# Patient Record
Sex: FEMALE | Race: WHITE | Employment: FULL TIME | URBAN - METROPOLITAN AREA
[De-identification: names, ages, dates, MRNs, and addresses within clinical notes are randomized per-mention and may not be internally consistent; named-entity substitution may affect disease eponyms.]

---

## 2017-04-26 ENCOUNTER — HOSPITAL ENCOUNTER (INPATIENT)
Age: 62
LOS: 1 days | Discharge: HOME | DRG: 247 | End: 2017-04-27
Attending: EMERGENCY MEDICINE | Admitting: INTERNAL MEDICINE
Payer: COMMERCIAL

## 2017-04-26 ENCOUNTER — APPOINTMENT (OUTPATIENT)
Dept: GENERAL RADIOLOGY | Age: 62
DRG: 247 | End: 2017-04-26
Attending: EMERGENCY MEDICINE
Payer: COMMERCIAL

## 2017-04-26 DIAGNOSIS — I21.4 NSTEMI (NON-ST ELEVATED MYOCARDIAL INFARCTION) (HCC): Primary | ICD-10-CM

## 2017-04-26 DIAGNOSIS — R07.9 ACUTE CHEST PAIN: ICD-10-CM

## 2017-04-26 PROBLEM — I20.0 UNSTABLE ANGINA (HCC): Status: ACTIVE | Noted: 2017-04-26

## 2017-04-26 LAB
ALBUMIN SERPL BCP-MCNC: 4 G/DL (ref 3.5–5)
ALBUMIN/GLOB SERPL: 1.2 {RATIO} (ref 1.1–2.2)
ALP SERPL-CCNC: 95 U/L (ref 45–117)
ALT SERPL-CCNC: 28 U/L (ref 12–78)
ANION GAP BLD CALC-SCNC: 12 MMOL/L (ref 5–15)
APPEARANCE UR: CLEAR
AST SERPL W P-5'-P-CCNC: 14 U/L (ref 15–37)
ATRIAL RATE: 70 BPM
ATRIAL RATE: 83 BPM
ATRIAL RATE: 85 BPM
BACTERIA URNS QL MICRO: NEGATIVE /HPF
BASOPHILS # BLD AUTO: 0 K/UL (ref 0–0.1)
BASOPHILS # BLD: 0 % (ref 0–1)
BILIRUB SERPL-MCNC: 0.3 MG/DL (ref 0.2–1)
BILIRUB UR QL: NEGATIVE
BUN SERPL-MCNC: 15 MG/DL (ref 6–20)
BUN/CREAT SERPL: 25 (ref 12–20)
CALCIUM SERPL-MCNC: 8.6 MG/DL (ref 8.5–10.1)
CALCULATED P AXIS, ECG09: 20 DEGREES
CALCULATED P AXIS, ECG09: 22 DEGREES
CALCULATED P AXIS, ECG09: 28 DEGREES
CALCULATED R AXIS, ECG10: 10 DEGREES
CALCULATED R AXIS, ECG10: 12 DEGREES
CALCULATED R AXIS, ECG10: 15 DEGREES
CALCULATED T AXIS, ECG11: 25 DEGREES
CALCULATED T AXIS, ECG11: 31 DEGREES
CALCULATED T AXIS, ECG11: 34 DEGREES
CHLORIDE SERPL-SCNC: 103 MMOL/L (ref 97–108)
CK MB CFR SERPL CALC: 2.3 % (ref 0–2.5)
CK MB SERPL-MCNC: 2 NG/ML (ref 5–25)
CK SERPL-CCNC: 79 U/L (ref 26–192)
CK SERPL-CCNC: 86 U/L (ref 26–192)
CO2 SERPL-SCNC: 23 MMOL/L (ref 21–32)
COLOR UR: ABNORMAL
CREAT SERPL-MCNC: 0.59 MG/DL (ref 0.55–1.02)
D DIMER PPP FEU-MCNC: 0.29 MG/L FEU (ref 0–0.65)
DIAGNOSIS, 93000: NORMAL
EOSINOPHIL # BLD: 0.4 K/UL (ref 0–0.4)
EOSINOPHIL NFR BLD: 6 % (ref 0–7)
EPITH CASTS URNS QL MICRO: ABNORMAL /LPF
ERYTHROCYTE [DISTWIDTH] IN BLOOD BY AUTOMATED COUNT: 13 % (ref 11.5–14.5)
GLOBULIN SER CALC-MCNC: 3.4 G/DL (ref 2–4)
GLUCOSE SERPL-MCNC: 115 MG/DL (ref 65–100)
GLUCOSE UR STRIP.AUTO-MCNC: NEGATIVE MG/DL
HCT VFR BLD AUTO: 37 % (ref 35–47)
HGB BLD-MCNC: 12.7 G/DL (ref 11.5–16)
HGB UR QL STRIP: NEGATIVE
KETONES UR QL STRIP.AUTO: NEGATIVE MG/DL
LEUKOCYTE ESTERASE UR QL STRIP.AUTO: ABNORMAL
LYMPHOCYTES # BLD AUTO: 24 % (ref 12–49)
LYMPHOCYTES # BLD: 1.8 K/UL (ref 0.8–3.5)
MAGNESIUM SERPL-MCNC: 2 MG/DL (ref 1.6–2.4)
MCH RBC QN AUTO: 30 PG (ref 26–34)
MCHC RBC AUTO-ENTMCNC: 34.3 G/DL (ref 30–36.5)
MCV RBC AUTO: 87.5 FL (ref 80–99)
MONOCYTES # BLD: 0.5 K/UL (ref 0–1)
MONOCYTES NFR BLD AUTO: 7 % (ref 5–13)
NEUTS SEG # BLD: 4.6 K/UL (ref 1.8–8)
NEUTS SEG NFR BLD AUTO: 63 % (ref 32–75)
NITRITE UR QL STRIP.AUTO: NEGATIVE
P-R INTERVAL, ECG05: 138 MS
P-R INTERVAL, ECG05: 152 MS
P-R INTERVAL, ECG05: 156 MS
PH UR STRIP: 6.5 [PH] (ref 5–8)
PLATELET # BLD AUTO: 254 K/UL (ref 150–400)
POTASSIUM SERPL-SCNC: 3.9 MMOL/L (ref 3.5–5.1)
PROT SERPL-MCNC: 7.4 G/DL (ref 6.4–8.2)
PROT UR STRIP-MCNC: NEGATIVE MG/DL
Q-T INTERVAL, ECG07: 378 MS
Q-T INTERVAL, ECG07: 394 MS
Q-T INTERVAL, ECG07: 412 MS
QRS DURATION, ECG06: 86 MS
QRS DURATION, ECG06: 92 MS
QRS DURATION, ECG06: 92 MS
QTC CALCULATION (BEZET), ECG08: 444 MS
QTC CALCULATION (BEZET), ECG08: 449 MS
QTC CALCULATION (BEZET), ECG08: 462 MS
RBC # BLD AUTO: 4.23 M/UL (ref 3.8–5.2)
RBC #/AREA URNS HPF: ABNORMAL /HPF (ref 0–5)
SODIUM SERPL-SCNC: 138 MMOL/L (ref 136–145)
SP GR UR REFRACTOMETRY: 1.01 (ref 1–1.03)
TROPONIN I BLD-MCNC: <0.04 NG/ML (ref 0–0.08)
TROPONIN I SERPL-MCNC: 0.06 NG/ML
TROPONIN I SERPL-MCNC: 0.09 NG/ML
UA: UC IF INDICATED,UAUC: ABNORMAL
UROBILINOGEN UR QL STRIP.AUTO: 0.2 EU/DL (ref 0.2–1)
VENTRICULAR RATE, ECG03: 70 BPM
VENTRICULAR RATE, ECG03: 83 BPM
VENTRICULAR RATE, ECG03: 85 BPM
WBC # BLD AUTO: 7.3 K/UL (ref 3.6–11)
WBC URNS QL MICRO: ABNORMAL /HPF (ref 0–4)

## 2017-04-26 PROCEDURE — 36415 COLL VENOUS BLD VENIPUNCTURE: CPT | Performed by: EMERGENCY MEDICINE

## 2017-04-26 PROCEDURE — B2111ZZ FLUOROSCOPY OF MULTIPLE CORONARY ARTERIES USING LOW OSMOLAR CONTRAST: ICD-10-PCS | Performed by: INTERNAL MEDICINE

## 2017-04-26 PROCEDURE — 87086 URINE CULTURE/COLONY COUNT: CPT | Performed by: EMERGENCY MEDICINE

## 2017-04-26 PROCEDURE — C1769 GUIDE WIRE: HCPCS

## 2017-04-26 PROCEDURE — 81001 URINALYSIS AUTO W/SCOPE: CPT | Performed by: EMERGENCY MEDICINE

## 2017-04-26 PROCEDURE — 74011000250 HC RX REV CODE- 250: Performed by: EMERGENCY MEDICINE

## 2017-04-26 PROCEDURE — C1725 CATH, TRANSLUMIN NON-LASER: HCPCS

## 2017-04-26 PROCEDURE — 85025 COMPLETE CBC W/AUTO DIFF WBC: CPT | Performed by: EMERGENCY MEDICINE

## 2017-04-26 PROCEDURE — C1887 CATHETER, GUIDING: HCPCS

## 2017-04-26 PROCEDURE — 84484 ASSAY OF TROPONIN QUANT: CPT | Performed by: EMERGENCY MEDICINE

## 2017-04-26 PROCEDURE — 74011250637 HC RX REV CODE- 250/637

## 2017-04-26 PROCEDURE — 80053 COMPREHEN METABOLIC PANEL: CPT | Performed by: EMERGENCY MEDICINE

## 2017-04-26 PROCEDURE — 87186 SC STD MICRODIL/AGAR DIL: CPT | Performed by: EMERGENCY MEDICINE

## 2017-04-26 PROCEDURE — 74011250636 HC RX REV CODE- 250/636

## 2017-04-26 PROCEDURE — 77030019697 HC SYR ANGI INFL MRTM -B

## 2017-04-26 PROCEDURE — 87077 CULTURE AEROBIC IDENTIFY: CPT | Performed by: EMERGENCY MEDICINE

## 2017-04-26 PROCEDURE — 87147 CULTURE TYPE IMMUNOLOGIC: CPT | Performed by: EMERGENCY MEDICINE

## 2017-04-26 PROCEDURE — 82550 ASSAY OF CK (CPK): CPT | Performed by: EMERGENCY MEDICINE

## 2017-04-26 PROCEDURE — 82553 CREATINE MB FRACTION: CPT | Performed by: EMERGENCY MEDICINE

## 2017-04-26 PROCEDURE — 71010 XR CHEST PORT: CPT

## 2017-04-26 PROCEDURE — 74011250637 HC RX REV CODE- 250/637: Performed by: EMERGENCY MEDICINE

## 2017-04-26 PROCEDURE — 74011000250 HC RX REV CODE- 250

## 2017-04-26 PROCEDURE — 96375 TX/PRO/DX INJ NEW DRUG ADDON: CPT

## 2017-04-26 PROCEDURE — 99285 EMERGENCY DEPT VISIT HI MDM: CPT

## 2017-04-26 PROCEDURE — B2151ZZ FLUOROSCOPY OF LEFT HEART USING LOW OSMOLAR CONTRAST: ICD-10-PCS | Performed by: INTERNAL MEDICINE

## 2017-04-26 PROCEDURE — 74011250637 HC RX REV CODE- 250/637: Performed by: INTERNAL MEDICINE

## 2017-04-26 PROCEDURE — 74011250636 HC RX REV CODE- 250/636: Performed by: INTERNAL MEDICINE

## 2017-04-26 PROCEDURE — 74011250636 HC RX REV CODE- 250/636: Performed by: EMERGENCY MEDICINE

## 2017-04-26 PROCEDURE — C1874 STENT, COATED/COV W/DEL SYS: HCPCS

## 2017-04-26 PROCEDURE — 027034Z DILATION OF CORONARY ARTERY, ONE ARTERY WITH DRUG-ELUTING INTRALUMINAL DEVICE, PERCUTANEOUS APPROACH: ICD-10-PCS | Performed by: INTERNAL MEDICINE

## 2017-04-26 PROCEDURE — 74011636320 HC RX REV CODE- 636/320

## 2017-04-26 PROCEDURE — 93005 ELECTROCARDIOGRAM TRACING: CPT

## 2017-04-26 PROCEDURE — 65660000000 HC RM CCU STEPDOWN

## 2017-04-26 PROCEDURE — 85379 FIBRIN DEGRADATION QUANT: CPT | Performed by: EMERGENCY MEDICINE

## 2017-04-26 PROCEDURE — 4A023N7 MEASUREMENT OF CARDIAC SAMPLING AND PRESSURE, LEFT HEART, PERCUTANEOUS APPROACH: ICD-10-PCS | Performed by: INTERNAL MEDICINE

## 2017-04-26 PROCEDURE — C1894 INTRO/SHEATH, NON-LASER: HCPCS

## 2017-04-26 PROCEDURE — C1760 CLOSURE DEV, VASC: HCPCS

## 2017-04-26 PROCEDURE — 83735 ASSAY OF MAGNESIUM: CPT | Performed by: EMERGENCY MEDICINE

## 2017-04-26 PROCEDURE — 96374 THER/PROPH/DIAG INJ IV PUSH: CPT

## 2017-04-26 PROCEDURE — 99153 MOD SED SAME PHYS/QHP EA: CPT

## 2017-04-26 RX ORDER — GUAIFENESIN 100 MG/5ML
81 LIQUID (ML) ORAL DAILY
Status: DISCONTINUED | OUTPATIENT
Start: 2017-04-26 | End: 2017-04-27 | Stop reason: HOSPADM

## 2017-04-26 RX ORDER — ZOLPIDEM TARTRATE 5 MG/1
5 TABLET ORAL
Status: DISCONTINUED | OUTPATIENT
Start: 2017-04-26 | End: 2017-04-27 | Stop reason: HOSPADM

## 2017-04-26 RX ORDER — METOPROLOL TARTRATE 5 MG/5ML
5 INJECTION INTRAVENOUS ONCE
Status: COMPLETED | OUTPATIENT
Start: 2017-04-26 | End: 2017-04-26

## 2017-04-26 RX ORDER — GUAIFENESIN 100 MG/5ML
324 LIQUID (ML) ORAL
Status: COMPLETED | OUTPATIENT
Start: 2017-04-26 | End: 2017-04-26

## 2017-04-26 RX ORDER — METOPROLOL TARTRATE 5 MG/5ML
INJECTION INTRAVENOUS
Status: COMPLETED
Start: 2017-04-26 | End: 2017-04-26

## 2017-04-26 RX ORDER — ONDANSETRON 2 MG/ML
4 INJECTION INTRAMUSCULAR; INTRAVENOUS
Status: COMPLETED | OUTPATIENT
Start: 2017-04-26 | End: 2017-04-26

## 2017-04-26 RX ORDER — MIDAZOLAM HYDROCHLORIDE 1 MG/ML
INJECTION, SOLUTION INTRAMUSCULAR; INTRAVENOUS
Status: COMPLETED
Start: 2017-04-26 | End: 2017-04-26

## 2017-04-26 RX ORDER — METOPROLOL SUCCINATE 25 MG/1
25 TABLET, EXTENDED RELEASE ORAL DAILY
Status: DISCONTINUED | OUTPATIENT
Start: 2017-04-26 | End: 2017-04-27 | Stop reason: HOSPADM

## 2017-04-26 RX ORDER — LIDOCAINE HYDROCHLORIDE 10 MG/ML
1-20 INJECTION INFILTRATION; PERINEURAL ONCE
Status: COMPLETED | OUTPATIENT
Start: 2017-04-26 | End: 2017-04-26

## 2017-04-26 RX ORDER — HEPARIN SODIUM 200 [USP'U]/100ML
500 INJECTION, SOLUTION INTRAVENOUS ONCE
Status: COMPLETED | OUTPATIENT
Start: 2017-04-26 | End: 2017-04-26

## 2017-04-26 RX ORDER — FENTANYL CITRATE 50 UG/ML
INJECTION, SOLUTION INTRAMUSCULAR; INTRAVENOUS
Status: COMPLETED
Start: 2017-04-26 | End: 2017-04-26

## 2017-04-26 RX ORDER — NITROGLYCERIN 20 MG/100ML
INJECTION INTRAVENOUS
Status: DISCONTINUED
Start: 2017-04-26 | End: 2017-04-26

## 2017-04-26 RX ORDER — CLOPIDOGREL 300 MG/1
TABLET, FILM COATED ORAL
Status: DISCONTINUED
Start: 2017-04-26 | End: 2017-04-26

## 2017-04-26 RX ORDER — HEPARIN SODIUM 1000 [USP'U]/ML
INJECTION, SOLUTION INTRAVENOUS; SUBCUTANEOUS
Status: COMPLETED
Start: 2017-04-26 | End: 2017-04-26

## 2017-04-26 RX ORDER — ATORVASTATIN CALCIUM 40 MG/1
40 TABLET, FILM COATED ORAL
Status: DISCONTINUED | OUTPATIENT
Start: 2017-04-26 | End: 2017-04-27 | Stop reason: HOSPADM

## 2017-04-26 RX ORDER — FENTANYL CITRATE 50 UG/ML
25-50 INJECTION, SOLUTION INTRAMUSCULAR; INTRAVENOUS
Status: DISCONTINUED | OUTPATIENT
Start: 2017-04-26 | End: 2017-04-26

## 2017-04-26 RX ORDER — PANTOPRAZOLE SODIUM 40 MG/1
40 TABLET, DELAYED RELEASE ORAL DAILY
Status: DISCONTINUED | OUTPATIENT
Start: 2017-04-26 | End: 2017-04-27 | Stop reason: HOSPADM

## 2017-04-26 RX ORDER — HEPARIN SODIUM 1000 [USP'U]/ML
4000 INJECTION, SOLUTION INTRAVENOUS; SUBCUTANEOUS ONCE
Status: COMPLETED | OUTPATIENT
Start: 2017-04-26 | End: 2017-04-26

## 2017-04-26 RX ORDER — MIDAZOLAM HYDROCHLORIDE 1 MG/ML
.5-2 INJECTION, SOLUTION INTRAMUSCULAR; INTRAVENOUS
Status: DISCONTINUED | OUTPATIENT
Start: 2017-04-26 | End: 2017-04-26

## 2017-04-26 RX ORDER — HEPARIN SODIUM 200 [USP'U]/100ML
INJECTION, SOLUTION INTRAVENOUS
Status: COMPLETED
Start: 2017-04-26 | End: 2017-04-26

## 2017-04-26 RX ORDER — LIDOCAINE HYDROCHLORIDE 10 MG/ML
INJECTION INFILTRATION; PERINEURAL
Status: COMPLETED
Start: 2017-04-26 | End: 2017-04-26

## 2017-04-26 RX ORDER — HEPARIN SODIUM 1000 [USP'U]/ML
5000 INJECTION, SOLUTION INTRAVENOUS; SUBCUTANEOUS ONCE
Status: COMPLETED | OUTPATIENT
Start: 2017-04-26 | End: 2017-04-26

## 2017-04-26 RX ORDER — OXYCODONE AND ACETAMINOPHEN 5; 325 MG/1; MG/1
1 TABLET ORAL
Status: DISCONTINUED | OUTPATIENT
Start: 2017-04-26 | End: 2017-04-27 | Stop reason: HOSPADM

## 2017-04-26 RX ORDER — SODIUM CHLORIDE 9 MG/ML
100 INJECTION, SOLUTION INTRAVENOUS CONTINUOUS
Status: DISPENSED | OUTPATIENT
Start: 2017-04-26 | End: 2017-04-26

## 2017-04-26 RX ORDER — SODIUM CHLORIDE 0.9 % (FLUSH) 0.9 %
5-10 SYRINGE (ML) INJECTION AS NEEDED
Status: DISCONTINUED | OUTPATIENT
Start: 2017-04-26 | End: 2017-04-27 | Stop reason: HOSPADM

## 2017-04-26 RX ORDER — SODIUM CHLORIDE 0.9 % (FLUSH) 0.9 %
5-10 SYRINGE (ML) INJECTION EVERY 8 HOURS
Status: DISCONTINUED | OUTPATIENT
Start: 2017-04-26 | End: 2017-04-27 | Stop reason: HOSPADM

## 2017-04-26 RX ORDER — NITROGLYCERIN 0.4 MG/1
0.4 TABLET SUBLINGUAL
Status: COMPLETED | OUTPATIENT
Start: 2017-04-26 | End: 2017-04-26

## 2017-04-26 RX ORDER — AMLODIPINE BESYLATE 5 MG/1
7.5 TABLET ORAL DAILY
COMMUNITY
End: 2021-03-23

## 2017-04-26 RX ADMIN — ASPIRIN 81 MG 324 MG: 81 TABLET ORAL at 07:26

## 2017-04-26 RX ADMIN — IOPAMIDOL 120 ML: 755 INJECTION, SOLUTION INTRAVENOUS at 11:13

## 2017-04-26 RX ADMIN — ATORVASTATIN CALCIUM 40 MG: 40 TABLET, FILM COATED ORAL at 22:24

## 2017-04-26 RX ADMIN — METOPROLOL TARTRATE 5 MG: 5 INJECTION INTRAVENOUS at 10:46

## 2017-04-26 RX ADMIN — IOPAMIDOL 39 ML: 755 INJECTION, SOLUTION INTRAVENOUS at 10:34

## 2017-04-26 RX ADMIN — HEPARIN SODIUM 1000 UNITS: 200 INJECTION, SOLUTION INTRAVENOUS at 10:32

## 2017-04-26 RX ADMIN — TICAGRELOR 180 MG: 90 TABLET ORAL at 11:12

## 2017-04-26 RX ADMIN — ONDANSETRON HYDROCHLORIDE 4 MG: 2 INJECTION, SOLUTION INTRAMUSCULAR; INTRAVENOUS at 07:46

## 2017-04-26 RX ADMIN — FENTANYL CITRATE 50 MCG: 50 INJECTION, SOLUTION INTRAMUSCULAR; INTRAVENOUS at 10:30

## 2017-04-26 RX ADMIN — OXYCODONE HYDROCHLORIDE AND ACETAMINOPHEN 1 TABLET: 5; 325 TABLET ORAL at 14:06

## 2017-04-26 RX ADMIN — NITROGLYCERIN 1 INCH: 20 OINTMENT TOPICAL at 07:26

## 2017-04-26 RX ADMIN — LIDOCAINE HYDROCHLORIDE 8 ML: 10 INJECTION, SOLUTION INFILTRATION; PERINEURAL at 10:33

## 2017-04-26 RX ADMIN — METOPROLOL TARTRATE 5 MG: 5 INJECTION INTRAVENOUS at 10:52

## 2017-04-26 RX ADMIN — METOPROLOL SUCCINATE 25 MG: 25 TABLET, EXTENDED RELEASE ORAL at 14:06

## 2017-04-26 RX ADMIN — HEPARIN SODIUM 4000 UNITS: 1000 INJECTION, SOLUTION INTRAVENOUS; SUBCUTANEOUS at 10:57

## 2017-04-26 RX ADMIN — HEPARIN SODIUM 5000 UNITS: 1000 INJECTION, SOLUTION INTRAVENOUS; SUBCUTANEOUS at 10:41

## 2017-04-26 RX ADMIN — NITROGLYCERIN 300 MCG: 5 INJECTION, SOLUTION INTRAVENOUS at 10:59

## 2017-04-26 RX ADMIN — MIDAZOLAM HYDROCHLORIDE 1 MG: 1 INJECTION, SOLUTION INTRAMUSCULAR; INTRAVENOUS at 10:40

## 2017-04-26 RX ADMIN — FENTANYL CITRATE 25 MCG: 50 INJECTION, SOLUTION INTRAMUSCULAR; INTRAVENOUS at 10:40

## 2017-04-26 RX ADMIN — HEPARIN SODIUM 1000 UNITS: 200 INJECTION, SOLUTION INTRAVENOUS at 10:31

## 2017-04-26 RX ADMIN — MIDAZOLAM HYDROCHLORIDE 2 MG: 1 INJECTION, SOLUTION INTRAMUSCULAR; INTRAVENOUS at 10:24

## 2017-04-26 RX ADMIN — NITROGLYCERIN 0.4 MG: 0.4 TABLET SUBLINGUAL at 07:26

## 2017-04-26 RX ADMIN — PANTOPRAZOLE SODIUM 40 MG: 40 TABLET, DELAYED RELEASE ORAL at 14:06

## 2017-04-26 RX ADMIN — ASPIRIN 81 MG: 81 TABLET, CHEWABLE ORAL at 14:06

## 2017-04-26 RX ADMIN — ZOLPIDEM TARTRATE 5 MG: 5 TABLET ORAL at 22:24

## 2017-04-26 RX ADMIN — LIDOCAINE HYDROCHLORIDE 40 ML: 20 SOLUTION ORAL; TOPICAL at 06:12

## 2017-04-26 RX ADMIN — LIDOCAINE HYDROCHLORIDE 8 ML: 10 INJECTION INFILTRATION; PERINEURAL at 10:33

## 2017-04-26 RX ADMIN — MIDAZOLAM HYDROCHLORIDE 2 MG: 1 INJECTION INTRAMUSCULAR; INTRAVENOUS at 10:24

## 2017-04-26 RX ADMIN — OXYCODONE HYDROCHLORIDE AND ACETAMINOPHEN 1 TABLET: 5; 325 TABLET ORAL at 20:43

## 2017-04-26 NOTE — PROGRESS NOTES
Pt up and ambulated to BR and then to chair to sit up for dinner. Pt denies any CP, SOB or dizziness. No bleeding at site.

## 2017-04-26 NOTE — PROCEDURES
PCI of Prox LAD    Pre Op : acute Unstable Angina Class IV. POst OP: Same. Sedation: 10:42 - 11;15   Versed / Fentanyl with continuous supervision. Prox LAD with hazy 95% stenosis. PCI with 3.5  Alpine DARRELL to 16 paul. 3.75 mm.   0% residual.   DALIA 3 flow.      Angioseal.

## 2017-04-26 NOTE — ED NOTES
Pt anxious at this time. Symptomatic blood pressure from nitro. Removed nitro paste and wiped area with towel. This nurse at patients bedside. Will continue to monitor.

## 2017-04-26 NOTE — ED NOTES
Informed patient of NPO status at this time. Patient denies any wants/needs, questions or concerns at this time. Will continue to monitor. Call light in reach.

## 2017-04-26 NOTE — IP AVS SNAPSHOT
Current Discharge Medication List  
  
START taking these medications Dose & Instructions Dispensing Information Comments Morning Noon Evening Bedtime  
 aspirin 81 mg chewable tablet Your last dose was: Your next dose is:    
   
   
 Dose:  81 mg Take 1 Tab by mouth daily. Quantity:  30 Tab Refills:  6  
     
   
   
   
  
 atorvastatin 40 mg tablet Commonly known as:  LIPITOR Your last dose was: Your next dose is:    
   
   
 Dose:  40 mg Take 1 Tab by mouth nightly. Quantity:  30 Tab Refills:  6  
     
   
   
   
  
 metoprolol succinate 25 mg XL tablet Commonly known as:  TOPROL-XL Your last dose was: Your next dose is:    
   
   
 Dose:  25 mg Take 1 Tab by mouth daily. Quantity:  30 Tab Refills:  6  
     
   
   
   
  
 ticagrelor 90 mg tablet Commonly known as:  [employer] Your last dose was: Your next dose is:    
   
   
 Dose:  90 mg Take 1 Tab by mouth every twelve (12) hours every twelve (12) hours. Quantity:  60 Tab Refills:  6 CONTINUE these medications which have CHANGED Dose & Instructions Dispensing Information Comments Morning Noon Evening Bedtime  
 amLODIPine 5 mg tablet Commonly known as:  Shon Michaels What changed:  Another medication with the same name was removed. Continue taking this medication, and follow the directions you see here. Your last dose was: Your next dose is:    
   
   
 Dose:  5 mg Take 5 mg by mouth daily. Refills:  0 Where to Get Your Medications Information on where to get these meds will be given to you by the nurse or doctor. ! Ask your nurse or doctor about these medications  
  aspirin 81 mg chewable tablet  
 atorvastatin 40 mg tablet  
 metoprolol succinate 25 mg XL tablet  
 ticagrelor 90 mg tablet

## 2017-04-26 NOTE — ED NOTES
Patient states she is feeling much better at this time. .  at bedside. Assisted pt to bathroom and back. Patient tolerated well. Denies any light headedness or dizziness. Will continue to monitor. Call light in reach. Bed locked and in lowest position.

## 2017-04-26 NOTE — ED NOTES
Cath lab team here to take patient. PT. Cell phone, purse, glasses, clothing, and slippers sent with patient to cath lab.

## 2017-04-26 NOTE — IP AVS SNAPSHOT
[address] [facility] 
[phone] Patient: Cruzito Alvarado MRN: [id number] AUQ:[id number] You are allergic to the following Allergen Reactions Codeine Nausea and Vomiting Recent Documentation Height Weight BMI OB Status Smoking Status 1.6 m 63.5 kg 24.8 kg/m2 Postmenopausal Never Smoker Unresulted Labs Order Current Status CULTURE, URINE In process Emergency Contacts Name Discharge Info Relation Home Work Mobile Roger Ayon DISCHARGE CAREGIVER [3] Spouse [3]   [phone] About your hospitalization You were admitted on:  April 26, 2017 You last received care in the:  [facility] 2 [facility] You were discharged on:  April 27, 2017 Unit phone number:  [phone] Why you were hospitalized Your primary diagnosis was:  Not on File Your diagnoses also included:  Unstable Angina (Hcc) Providers Seen During Your Hospitalizations Provider Role Specialty Primary office phone Stepan Arias MD Attending Provider Emergency Medicine [phone] Kaleb Bonner MD Attending Provider Cardiology [phone] Your Primary Care Physician (PCP) Primary Care Physician Office Phone Office Fax Sergio Glass [phone] Follow-up Information Follow up With Details Comments Contact Info Rhiannon Reyes MD   [address] [facility] [address] [facility] [address] [id number] 
[phone] Current Discharge Medication List  
  
START taking these medications Dose & Instructions Dispensing Information Comments Morning Noon Evening Bedtime  
 aspirin 81 mg chewable tablet Your last dose was: Your next dose is:    
   
   
 Dose:  81 mg Take 1 Tab by mouth daily. Quantity:  30 Tab Refills:  6  
     
   
   
   
  
 atorvastatin 40 mg tablet Commonly known as:  LIPITOR Your last dose was: Your next dose is:    
   
   
 Dose:  40 mg Take 1 Tab by mouth nightly. Quantity:  30 Tab Refills:  6  
     
   
   
   
  
 metoprolol succinate 25 mg XL tablet Commonly known as:  TOPROL-XL Your last dose was: Your next dose is:    
   
   
 Dose:  25 mg Take 1 Tab by mouth daily. Quantity:  30 Tab Refills:  6  
     
   
   
   
  
 ticagrelor 90 mg tablet Commonly known as:  [employer] Your last dose was: Your next dose is:    
   
   
 Dose:  90 mg Take 1 Tab by mouth every twelve (12) hours every twelve (12) hours. Quantity:  60 Tab Refills:  6 CONTINUE these medications which have CHANGED Dose & Instructions Dispensing Information Comments Morning Noon Evening Bedtime  
 amLODIPine 5 mg tablet Commonly known as:  [employer]ett What changed:  Another medication with the same name was removed. Continue taking this medication, and follow the directions you see here. Your last dose was: Your next dose is:    
   
   
 Dose:  5 mg Take 5 mg by mouth daily. Refills:  0 Where to Get Your Medications Information on where to get these meds will be given to you by the nurse or doctor. ! Ask your nurse or doctor about these medications  
  aspirin 81 mg chewable tablet  
 atorvastatin 40 mg tablet  
 metoprolol succinate 25 mg XL tablet  
 ticagrelor 90 mg tablet Discharge Instructions None Discharge Orders None Introducing [facility] SERVICES! Ammon Bolton introduces [email or web address] patient portal. Now you can access parts of your medical record, email your doctor's office, and request medication refills online. 1. In your internet browser, go to [email or web address] 2. Click on the First Time User? Click Here link in the Sign In box. You will see the New Member Sign Up page. 3. Enter your [employer] Access Code exactly as it appears below. You will not need to use this code after youve completed the sign-up process. If you do not sign up before the expiration date, you must request a new code. · [employer] Access Code: [email or web address] Expires: 7/25/2017  4:34 AM 
 
4. Enter the last four digits of your Social Security Number (xxxx) and Date of Birth (mm/dd/yyyy) as indicated and click Submit. You will be taken to the next sign-up page. 5. Create a [employer] ID. This will be your [employer] login ID and cannot be changed, so think of one that is secure and easy to remember. 6. Create a [employer] password. You can change your password at any time. 7. Enter your Password Reset Question and Answer. This can be used at a later time if you forget your password. 8. Enter your e-mail address. You will receive e-mail notification when new information is available in [address] 9. Click Sign Up. You can now view and download portions of your medical record. 10. Click the Download Summary menu link to download a portable copy of your medical information. If you have questions, please visit the Frequently Asked Questions section of the [employer] website. Remember, [employer] is NOT to be used for urgent needs. For medical emergencies, dial 911. Now available from your iPhone and Android! General Information Please provide this summary of care documentation to your next provider. Patient Signature:  ____________________________________________________________ Date:  ____________________________________________________________  
  
[facility] Provider Signature:  ____________________________________________________________ Date:  ____________________________________________________________

## 2017-04-26 NOTE — ED PROVIDER NOTES
HPI Comments: Nba Cruz, 63 y.o. female, presents via EMS to [facility] ED with cc of progressively worsening sharp and constant chest pain x [phone] last night. Patient states she felt \"lousy\" yesterday and then began to experience chest pain while lying in bed. She reports that her pain worsened at 0330 before calling for EMS. Her chest pain is associated with arm pain, feeling dizzy upon standing and leg swelling over the last few weeks. The patient reports that she was given ntg x 2 by EMS. She states she has had episodes of GERD-like pain in the past, which typically resolves within 15-20 minutes. The patient denies specific complaints of shortness of breath, diaphoresis, nausea, vomiting, palpitations, changes in stool, dysuria, cough, fever, or abd pain. She works as an  at [employer]. The patient denies performing any heavy lifting duties and states her job is not stressful at this time. She also denies any recent travel or standing for prolonged periods of time. Her fhx is significant for CAD with her father receiving a triple bypass around age 53 y/o. She also denies a pmhx of DM or HLD. The patient does not have a regular cardiologist and has never had a stress test.       PCP: Heidi Garay MD    PMHx significant for: HTN  PSHx significant for: none  Social history significant for: - Tobacco, + EtOH, - Illicit Drug Use    There are no other complaints, changes, or physical findings at this time. Written by COSTA Mark, as dictated by Pepe Reyna MD.     The history is provided by the patient. No  was used. Past Medical History:   Diagnosis Date    Hypertension        Past Surgical History:   Procedure Laterality Date    HX OTHER SURGICAL      surgery for fissure         History reviewed. No pertinent family history.     Social History     Social History    Marital status:      Spouse name: N/A    Number of children: N/A    Years of education: N/A     Occupational History    Not on file. Social History Main Topics    Smoking status: Never Smoker    Smokeless tobacco: Not on file    Alcohol use Yes      Comment: occassionally    Drug use: Not on file    Sexual activity: Not on file     Other Topics Concern    Not on file     Social History Narrative         ALLERGIES: Codeine    Review of Systems   Constitutional: Positive for fatigue. Negative for diaphoresis and fever. HENT: Negative. Eyes: Negative. Respiratory: Negative. Negative for cough. Cardiovascular: Positive for chest pain and leg swelling. Negative for palpitations. Gastrointestinal: Negative. Negative for abdominal pain, blood in stool, constipation, diarrhea, nausea and vomiting. Genitourinary: Negative. Negative for dysuria. Musculoskeletal: Positive for myalgias. Skin: Negative. Neurological: Negative. Psychiatric/Behavioral: Negative. All other systems reviewed and are negative. Patient Vitals for the past 12 hrs:   Temp Pulse Resp BP SpO2   04/26/17 0900 - 89 - 123/80 98 %   04/26/17 0830 - 80 17 124/72 97 %   04/26/17 0800 - 73 17 125/78 97 %   04/26/17 0748 - 75 11 128/69 98 %   04/26/17 0741 - 75 - (!) 78/65 98 %   04/26/17 0730 - 93 21 110/61 97 %   04/26/17 0726 - 77 - 131/75 -   04/26/17 0422 97.8 °F (36.6 °C) 87 15 - 98 %        Physical Exam   Nursing note and vitals reviewed.   General appearance - well nourished, well appearing, and in no distress  Eyes - pupils equal and reactive, extraocular eye movements intact  ENT - mucous membranes moist, pharynx normal without lesions  Neck - supple, no significant adenopathy; non-tender to palpation  Chest - clear to auscultation, no wheezes, rales or rhonchi; non-tender to palpation  Heart - normal rate and regular rhythm, S1 and S2 normal, no murmurs noted  Abdomen - soft, nontender, nondistended, no masses or organomegaly  Musculoskeletal - no joint tenderness, deformity or swelling; normal ROM  Extremities - peripheral pulses normal, no pedal edema  Skin - normal coloration and turgor, no rashes  Neurological - alert, oriented x3, normal speech, no focal findings or movement disorder noted      MDM  Number of Diagnoses or Management Options  Diagnosis management comments:   Ddx: chest wall pain, GERD, PNA, ACS        Amount and/or Complexity of Data Reviewed  Clinical lab tests: ordered and reviewed  Tests in the radiology section of CPT®: ordered and reviewed  Tests in the medicine section of CPT®: reviewed and ordered  Review and summarize past medical records: yes  Discuss the patient with other providers: yes (Cardiology )  Independent visualization of images, tracings, or specimens: yes    Patient Progress  Patient progress: stable    ED Course       Procedures    EKG interpretation: (Preliminary) 0425  Rhythm: sinus rhythm and PAC's. Rate (approx.): 85 bpm; Axis: normal; Normal MI, QRS, QTc intervals; Other findings: no ischemic changes. Written by Marcos Arcos ED scribe, as dictated by Jamie Fuller MD.     Progress Note:  6:46 AM  Patient resting in gurney. Updated and counseled on results. Patient now states she was given ntg x 2 by EMS and reports no relief with GI cocktail. Updated on plan to order ASA. Patient is agreeable. Written by Marcos Arcos ED Scribe, as dictated by Jamie Fuller MD.     SIGN OUT:  7:25 AM  Patient's presentation, labs/imaging and plan of care was reviewed with Lico Gross MD as part of sign out. They will discharge as part of the plan discussed with the patient. Lico Gross MD's assistance in completion of this plan is greatly appreciated but it should be noted that I will be the provider of record for this patient.     This note is prepared by Marcos Arcos, acting as a Scribe for Mariela Huang MD.    Jamie Fuller MD: The scribe's documentation has been prepared under my direction and personally reviewed by me in its entirety. I confirm that the notes above accurately reflects all work, treatment, procedures, and medical decision making performed by me. EKG interpretation: (Secondary) 0836  Rhythm: normal sinus rhythm; and regular . Rate (approx.): 83; Normal axis and intervals. No acute ischemic changes. Written by COSTA Disla, as dictated by Anya Gomez MD    CONSULT NOTE:  8:46 AM  Anya Gomez MD spoke with Dr. Viv Flores,  Specialty: Cardiology   Discussed pt's hx, disposition, and available diagnostic and imaging results. Reviewed care plans. Consultant agrees to evaluate the pt. Written by COSTA Disla, as dictated by Anya Gomez MD.    PROGRESS NOTE:  8:47 AM  Dr. Viv Flores is at beside at this time. Written by COSTA Disla, as dictated by Anya Gomez MD    9:19 AM  2nd troponin elevated, awaiting Dr. Viv Flores recommendation. Written by COSTA Disla, as dictated by Anya Gomez MD.    9:32 AM  Dr. Viv Flores agrees to admit to pt, and plans for cardiac cath. Written by COSTA Disla, as dictated by Anya Gomez MD    CRITICAL CARE NOTE :          IMPENDING DETERIORATION -Cardiovascular    ASSOCIATED RISK FACTORS - Vascular Compromise    MANAGEMENT- Bedside Assessment and Supervision of Care    INTERPRETATION -  Xrays, ECG and Blood Pressure    INTERVENTIONS - hemodynamic mngmt and Metobolic interventions    CASE REVIEW - Medical Sub-Specialist and Nursing    TREATMENT RESPONSE -Improved    PERFORMED BY - Self        NOTES   :      I have spent 30-74 minutes of critical care time involved in lab review, consultations with specialist, family decision- making, bedside attention and documentation. During this entire length of time I was immediately available to the patient .     Mariela Collado MD        LABORATORY TESTS:  Recent Results (from the past 12 hour(s))   EKG, 12 LEAD, INITIAL    Collection Time: 04/26/17  4:25 AM   Result Value Ref Range    Ventricular Rate 85 BPM    Atrial Rate 85 BPM    P-R Interval 138 ms    QRS Duration 86 ms    Q-T Interval 378 ms    QTC Calculation (Bezet) 449 ms    Calculated P Axis 22 degrees    Calculated R Axis 10 degrees    Calculated T Axis 34 degrees    Diagnosis       Sinus rhythm with premature supraventricular complexes  Otherwise normal ECG  No previous ECGs available     CBC WITH AUTOMATED DIFF    Collection Time: 04/26/17  5:21 AM   Result Value Ref Range    WBC 7.3 3.6 - 11.0 K/uL    RBC 4.23 3.80 - 5.20 M/uL    HGB 12.7 11.5 - 16.0 g/dL    HCT 37.0 35.0 - 47.0 %    MCV 87.5 80.0 - 99.0 FL    MCH 30.0 26.0 - 34.0 PG    MCHC 34.3 30.0 - 36.5 g/dL    RDW 13.0 11.5 - 14.5 %    PLATELET 673 733 - 676 K/uL    NEUTROPHILS 63 32 - 75 %    LYMPHOCYTES 24 12 - 49 %    MONOCYTES 7 5 - 13 %    EOSINOPHILS 6 0 - 7 %    BASOPHILS 0 0 - 1 %    ABS. NEUTROPHILS 4.6 1.8 - 8.0 K/UL    ABS. LYMPHOCYTES 1.8 0.8 - 3.5 K/UL    ABS. MONOCYTES 0.5 0.0 - 1.0 K/UL    ABS. EOSINOPHILS 0.4 0.0 - 0.4 K/UL    ABS. BASOPHILS 0.0 0.0 - 0.1 K/UL   METABOLIC PANEL, COMPREHENSIVE    Collection Time: 04/26/17  5:21 AM   Result Value Ref Range    Sodium 138 136 - 145 mmol/L    Potassium 3.9 3.5 - 5.1 mmol/L    Chloride 103 97 - 108 mmol/L    CO2 23 21 - 32 mmol/L    Anion gap 12 5 - 15 mmol/L    Glucose 115 (H) 65 - 100 mg/dL    BUN 15 6 - 20 MG/DL    Creatinine 0.59 0.55 - 1.02 MG/DL    BUN/Creatinine ratio 25 (H) 12 - 20      GFR est AA >60 >60 ml/min/1.73m2    GFR est non-AA >60 >60 ml/min/1.73m2    Calcium 8.6 8.5 - 10.1 MG/DL    Bilirubin, total 0.3 0.2 - 1.0 MG/DL    ALT (SGPT) 28 12 - 78 U/L    AST (SGOT) 14 (L) 15 - 37 U/L    Alk.  phosphatase 95 45 - 117 U/L    Protein, total 7.4 6.4 - 8.2 g/dL    Albumin 4.0 3.5 - 5.0 g/dL    Globulin 3.4 2.0 - 4.0 g/dL    A-G Ratio 1.2 1.1 - 2.2     CK W/ REFLX CKMB    Collection Time: 04/26/17  5:21 AM   Result Value Ref Range    CK 79 26 - 192 U/L   TROPONIN I    Collection Time: 04/26/17  5:21 AM   Result Value Ref Range    Troponin-I, Qt. 0.06 (H) <0.05 ng/mL   D DIMER    Collection Time: 04/26/17  5:21 AM   Result Value Ref Range    D-dimer 0.29 0.00 - 0.65 mg/L FEU   MAGNESIUM    Collection Time: 04/26/17  5:21 AM   Result Value Ref Range    Magnesium 2.0 1.6 - 2.4 mg/dL   POC TROPONIN-I    Collection Time: 04/26/17  5:27 AM   Result Value Ref Range    Troponin-I (POC) <0.04 0.00 - 0.08 ng/mL   URINALYSIS W/ REFLEX CULTURE    Collection Time: 04/26/17  5:35 AM   Result Value Ref Range    Color YELLOW/STRAW      Appearance CLEAR CLEAR      Specific gravity 1.011 1.003 - 1.030      pH (UA) 6.5 5.0 - 8.0      Protein NEGATIVE  NEG mg/dL    Glucose NEGATIVE  NEG mg/dL    Ketone NEGATIVE  NEG mg/dL    Bilirubin NEGATIVE  NEG      Blood NEGATIVE  NEG      Urobilinogen 0.2 0.2 - 1.0 EU/dL    Nitrites NEGATIVE  NEG      Leukocyte Esterase SMALL (A) NEG      WBC 5-10 0 - 4 /hpf    RBC 0-5 0 - 5 /hpf    Epithelial cells FEW FEW /lpf    Bacteria NEGATIVE  NEG /hpf    UA:UC IF INDICATED URINE CULTURE ORDERED (A) CNI     CK W/ CKMB & INDEX    Collection Time: 04/26/17  8:30 AM   Result Value Ref Range    CK 86 26 - 192 U/L    CK - MB 2.0 <3.6 NG/ML    CK-MB Index 2.3 0 - 2.5     TROPONIN I    Collection Time: 04/26/17  8:30 AM   Result Value Ref Range    Troponin-I, Qt. 0.09 (H) <0.05 ng/mL   EKG, 12 LEAD, SUBSEQUENT    Collection Time: 04/26/17  8:36 AM   Result Value Ref Range    Ventricular Rate 83 BPM    Atrial Rate 83 BPM    P-R Interval 156 ms    QRS Duration 92 ms    Q-T Interval 394 ms    QTC Calculation (Bezet) 462 ms    Calculated P Axis 20 degrees    Calculated R Axis 12 degrees    Calculated T Axis 25 degrees    Diagnosis       Normal sinus rhythm  Normal ECG  When compared with ECG of 26-APR-2017 04:25,  MANUAL COMPARISON REQUIRED, DATA IS UNCONFIRMED         IMAGING RESULTS:  Study Result      EXAM: XR CHEST PORT     INDICATION: Chest pain NOS, hypertension     COMPARISON: None     TECHNIQUE: Semiupright portable chest AP view     FINDINGS: Cardiac monitoring wires overlie the thorax. The cardiomediastinal and  hilar contours are within normal limits. The pulmonary vasculature is within  normal limits.      The lungs and pleural spaces are clear. The visualized bones and upper abdomen  are age-appropriate.     IMPRESSION  IMPRESSION:     No acute process on portable chest.          MEDICATIONS GIVEN:  Medications   mylanta/viscous lidocaine (MYRON)(GI COCKTAIL) (40 mL Oral Given 4/26/17 0612)   aspirin chewable tablet 324 mg (324 mg Oral Given 4/26/17 0726)   nitroglycerin (NITROSTAT) tablet 0.4 mg (0.4 mg SubLINGual Given 4/26/17 0726)   nitroglycerin (NITROBID) 2 % ointment 1 Inch (1 Inch Topical Given 4/26/17 0726)   ondansetron (ZOFRAN) injection 4 mg (4 mg IntraVENous Given 4/26/17 0746)       IMPRESSION:  1. NSTEMI (non-ST elevated myocardial infarction) ([facility] [address]    2. Acute chest pain        PLAN:  1. Admit to Hospital  Admit Note:  9:33 AM  Patient is being admitted to the hospital. The results of their tests and reasons for their admission have been discussed with the patient and/or available family. They convey their agreement and understanding for the need to be admitted and for their admission diagnosis. Written by Mukesh Matos, ED Scribe, as dictated by Hue Cardenas MD.          Attestation: This is note is prepared by Mukesh Matos, acting as Scribe for MD Hue Woo MD The scribe's documentation has been prepared under my direction and personally reviewed by me in its entirety. I confirm that the note above accurately reflects all work, treatment, procedures, and medical decision making performed by me. This note will not be viewable in [address]

## 2017-04-26 NOTE — PROGRESS NOTES
LHC, cor angio.  LV gram completed s complic   [place]    Findings    1- 90% proximal LAD, mild dz elsewhere  2- Nl overall LV systolic fxn with apical HK  3- nl LVEDP    Plan    PCI of LAD

## 2017-04-26 NOTE — CONSULTS
[address]   [address] [facility]       Name:  Shreya Tran   MR#:  [id number]   :  1955   Account #:  [de-identified]    Date of Consultation:  2017   Date of Adm:  2017       REQUESTING PHYSICIAN: Jack Culp. Dutch Escalera MD    REASON FOR CONSULTATION: Evaluate chest pain. CHIEF COMPLAINT: Chest pain. HISTORY OF PRESENT ILLNESS: The patient is a 65-year-old   female with a history of hypertension, but no prior cardiac history. She   presented after being brought in by rescue squad to the emergency   room earlier this morning. Last night after dinner, she developed   persistent left-sided chest pain with some radiation to her left arm. The   symptoms did not go away after a few minutes and the patient called   the rescue squad. She was given nitroglycerin sublingual en route. She   has also had some lower extremity swelling for the last several weeks. She is not particularly physically active. She does get short of breath   after walking up stairs. She has had prior chest discomfort in the past,   which she attributed to gastroesophageal reflux. She denies syncope   or dizziness. She denies palpitations or murmur. She has had no prior   stress testing. She does have a family history of heart disease with her   father having bypass surgery in his early 62s. In the ER, initial point of care troponin was negative, but a repeat was   0.06. A second troponin is pending. The patient is still experiencing   some left-sided chest discomfort, which is less intense than before. In terms of other risk factors, the patient denies diabetes. She does not   smoke. She says her lipids are okay. She has been on amlodipine for   the last several years. PAST MEDICAL HISTORY: Otherwise, fairly healthy. No other active   medical problems.     SURGERIES: Status post breast cyst surgery, which was benign,   status post vaginal surgery. CURRENT MEDICATIONS: Include only Norvasc. SOCIAL HISTORY: The patient does not smoke. She drinks alcohol   socially. She works as an  at [employer]. FAMILY HISTORY: As noted above. History of bypass in the patient's   father. REVIEW OF SYSTEMS: As noted above. No fever, no chills. Positive   for some diaphoresis. No thyroid problems. No diabetes. No melena,   no hematochezia. No nausea, vomiting, diarrhea. No liver or kidney   problems. No history of PE or DVT. No strokes or TIAs. No cancer. PHYSICAL EXAMINATION   GENERAL: Reveals a somewhat anxious appearing, middle-aged   white female. VITAL SIGNS: Blood pressure is 131/75, pulse 87, respirations 15,   temperature 97.8. HEENT: Pupils are equal and reactive to light. Oropharynx   revealed moist oral mucosa. NECK: Supple. No masses or thyromegaly. No cervical or   supraclavicular adenopathy. No carotid bruit. No JVD. CHEST: Clear. BACK: No scoliosis. CARDIAC: Regular rate and rhythm. Normal S1 and S2. No obvious   murmurs, rubs, gallops or clicks. ABDOMEN: Soft, nontender, no masses or organomegaly. Bowel   sounds positive. EXTREMITIES: No cyanosis, clubbing, or edema. Distal pulses 2+ in   the feet bilaterally. NEUROLOGIC: No obvious gross motor deficits. The patient is alert   and answers questions appropriately. LABORATORY DATA: D-dimer 0.29, troponin 0.06, hemoglobin 12.7. The remainder of the laboratories are unremarkable. Chest x-ray negative. EKG: Normal sinus rhythm, normal axis, no Q-waves, no obvious ST   or T changes. IMPRESSION   1. Chest pain of uncertain etiology, possible angina. 2. Hypertension. 3. Family history of coronary artery disease. RECOMMENDATIONS: Await the second troponin. If this is clearly   abnormal, the patient will be admitted for further workup. If the second   troponin is completely negative, we will probably discharge to home   with outpatient workup. MD Tam Holloway / Roz.Gess   D:  04/26/2017   09:11   T:  04/26/2017   09:48   Job #:  [id number]

## 2017-04-27 VITALS
SYSTOLIC BLOOD PRESSURE: 127 MMHG | OXYGEN SATURATION: 98 % | WEIGHT: 140 LBS | TEMPERATURE: 98.6 F | HEART RATE: 85 BPM | RESPIRATION RATE: 16 BRPM | HEIGHT: 63 IN | DIASTOLIC BLOOD PRESSURE: 60 MMHG | BODY MASS INDEX: 24.8 KG/M2

## 2017-04-27 LAB
ANION GAP BLD CALC-SCNC: 9 MMOL/L (ref 5–15)
BASOPHILS # BLD AUTO: 0 K/UL (ref 0–0.1)
BASOPHILS # BLD: 0 % (ref 0–1)
BUN SERPL-MCNC: 10 MG/DL (ref 6–20)
BUN/CREAT SERPL: 19 (ref 12–20)
CALCIUM SERPL-MCNC: 8.7 MG/DL (ref 8.5–10.1)
CHLORIDE SERPL-SCNC: 105 MMOL/L (ref 97–108)
CHOLEST SERPL-MCNC: 208 MG/DL
CO2 SERPL-SCNC: 25 MMOL/L (ref 21–32)
CREAT SERPL-MCNC: 0.54 MG/DL (ref 0.55–1.02)
EOSINOPHIL # BLD: 0.1 K/UL (ref 0–0.4)
EOSINOPHIL NFR BLD: 2 % (ref 0–7)
ERYTHROCYTE [DISTWIDTH] IN BLOOD BY AUTOMATED COUNT: 13.3 % (ref 11.5–14.5)
GLUCOSE SERPL-MCNC: 105 MG/DL (ref 65–100)
HCT VFR BLD AUTO: 33.6 % (ref 35–47)
HDLC SERPL-MCNC: 51 MG/DL
HDLC SERPL: 4.1 {RATIO} (ref 0–5)
HGB BLD-MCNC: 11.5 G/DL (ref 11.5–16)
LDLC SERPL CALC-MCNC: 125.6 MG/DL (ref 0–100)
LIPID PROFILE,FLP: ABNORMAL
LYMPHOCYTES # BLD AUTO: 32 % (ref 12–49)
LYMPHOCYTES # BLD: 2.2 K/UL (ref 0.8–3.5)
MCH RBC QN AUTO: 30 PG (ref 26–34)
MCHC RBC AUTO-ENTMCNC: 34.2 G/DL (ref 30–36.5)
MCV RBC AUTO: 87.7 FL (ref 80–99)
MONOCYTES # BLD: 0.5 K/UL (ref 0–1)
MONOCYTES NFR BLD AUTO: 8 % (ref 5–13)
NEUTS SEG # BLD: 3.9 K/UL (ref 1.8–8)
NEUTS SEG NFR BLD AUTO: 58 % (ref 32–75)
PLATELET # BLD AUTO: 237 K/UL (ref 150–400)
POTASSIUM SERPL-SCNC: 3.9 MMOL/L (ref 3.5–5.1)
RBC # BLD AUTO: 3.83 M/UL (ref 3.8–5.2)
SODIUM SERPL-SCNC: 139 MMOL/L (ref 136–145)
TRIGL SERPL-MCNC: 157 MG/DL (ref ?–150)
VLDLC SERPL CALC-MCNC: 31.4 MG/DL
WBC # BLD AUTO: 6.8 K/UL (ref 3.6–11)

## 2017-04-27 PROCEDURE — 85025 COMPLETE CBC W/AUTO DIFF WBC: CPT | Performed by: INTERNAL MEDICINE

## 2017-04-27 PROCEDURE — 74011250637 HC RX REV CODE- 250/637: Performed by: INTERNAL MEDICINE

## 2017-04-27 PROCEDURE — 80061 LIPID PANEL: CPT | Performed by: INTERNAL MEDICINE

## 2017-04-27 PROCEDURE — 36415 COLL VENOUS BLD VENIPUNCTURE: CPT | Performed by: INTERNAL MEDICINE

## 2017-04-27 PROCEDURE — 80048 BASIC METABOLIC PNL TOTAL CA: CPT | Performed by: INTERNAL MEDICINE

## 2017-04-27 RX ORDER — GUAIFENESIN 100 MG/5ML
81 LIQUID (ML) ORAL DAILY
Qty: 30 TAB | Refills: 6 | Status: SHIPPED | OUTPATIENT
Start: 2017-04-27

## 2017-04-27 RX ORDER — ATORVASTATIN CALCIUM 40 MG/1
40 TABLET, FILM COATED ORAL
Qty: 30 TAB | Refills: 6 | Status: SHIPPED | OUTPATIENT
Start: 2017-04-27 | End: 2021-03-11

## 2017-04-27 RX ORDER — METOPROLOL SUCCINATE 25 MG/1
25 TABLET, EXTENDED RELEASE ORAL DAILY
Qty: 30 TAB | Refills: 6 | Status: SHIPPED | OUTPATIENT
Start: 2017-04-27 | End: 2020-07-21 | Stop reason: DRUGHIGH

## 2017-04-27 RX ADMIN — TICAGRELOR 90 MG: 90 TABLET ORAL at 05:04

## 2017-04-27 RX ADMIN — PANTOPRAZOLE SODIUM 40 MG: 40 TABLET, DELAYED RELEASE ORAL at 08:16

## 2017-04-27 RX ADMIN — METOPROLOL SUCCINATE 25 MG: 25 TABLET, EXTENDED RELEASE ORAL at 08:16

## 2017-04-27 RX ADMIN — ASPIRIN 81 MG: 81 TABLET, CHEWABLE ORAL at 08:16

## 2017-04-27 RX ADMIN — Medication 10 ML: at 05:04

## 2017-04-27 NOTE — PROCEDURES
[facility], [address] [place] [address]   CORONARY ANGIOGRAPHY       Name:  Kristal Tinajero   MR#:  [id number]   :  1955   Account #:  [de-identified]        Date of Adm:  2017       CATHETERIZATION NUMBER:     TECHNIQUE: Right femoral artery via Charley. PROCEDURES   1. Left heart catheterization. 2. Coronary angiography. 3. Left ventriculography. ESTIMATED BLOOD LOSS: Less than 30 mL. SPECIMENS REMOVED: None. : Pippa Johnson MD.    INDICATION: Unstable angina. CATHETERS USED: 5-Slovak pigtail, 5-Slovak JL4, 5-Slovak JR4. MEDICATIONS USED: Please see the separate cath lab log sheet. SEDATION: Sedation was administered from 10:26 a.m. until 10:42   a.m. under the constant supervision of the attending physician. The   patient received IV Versed and IV fentanyl. COMPLICATIONS: None. FINDINGS   HEMODYNAMICS: Aortic pressure is 115/60 with a mean of 86. Left   ventricular pressure 119/0 with an LVEDP of 10. There is no significant   gradient on pullback across the aortic valve. LEFT VENTRICULOGRAPHY: Done from the COE view revealed a   normal-sized left ventricle with overall preserved systolic function. Estimated EF is 55-60%. There is mild apical hypokinesis. No other   wall motion abnormalities are seen. No significant mitral regurgitation   seen. CORONARY ANGIOGRAPHY: Revealed a right dominant system. The   left main was a moderate-sized vessel without significant disease. The   LAD had a very high-grade ostial and proximal stenosis, approximately   95% in severity with some associated haziness. The remainder of the   LAD was a moderate-sized vessel, which extended to the apex. There   were 2 moderate-sized diagonal branches off the mid LAD without   significant disease. The left circumflex was moderate in size. It gave off   a large first marginal branch without significant disease.  The OM2 was   a large vessel without significant disease. The remainder of the AV   groove circumflex distal to OM2 was a small vessel without significant   disease. The right coronary artery was a large, dominant vessel, which   bifurcated into a large PDA and large posterolateral branch with   multiple subbranches. This vessel had no apparent disease. CONCLUSION   1. Significant single-vessel coronary artery disease with a high-grade   proximal left anterior descending stenosis. 2. Preserved left ventricular systolic function with a global hypokinesis. 3. Normal resting left ventricular end-diastolic pressure. RECOMMENDATIONS: Percutaneous intervention on the LAD, which   will be reported separately.         MD Wil Grover / Cecilia.Bruno   D:  04/27/2017   14:23   T:  04/27/2017   15:06   Job #:  [id number]

## 2017-04-27 NOTE — DISCHARGE SUMMARY
[address] SUMMARY       Name:  Jana Vitale   MR#:  [id number]   :  1955   Account #:  [de-identified]        Date of Adm:  2017       DISCHARGE DIAGNOSES:   1. Unstable angina, resolved. 2. Status post percutaneous transluminal coronary angioplasty and   stenting of a high-grade proximal left anterior descending stenosis with   a drug-eluting stent on the day of admission. 3. Normal overall left ventricular systolic function with mild apical   hypokinesis. 4. Dyslipidemia. 5. Hypertension. 6. Family history of coronary artery disease. ADMISSION HISTORY AND PHYSICAL: Please see the separately   dictated note by Dr. Jaida Lynn on the day of admission. HOSPITAL COURSE: The patient was admitted. Her second set of   cardiac enzymes was borderline abnormal. She was having ongoing   symptoms of midsternal chest discomfort and was taken to the cardiac   catheterization lab. Initial coronary angiography revealed a high-grade   proximal LAD stenosis. There was mild disease in the remaining   vessels. Overall EF was normal with mild apical hypokinesis. The   patient subsequently underwent PTCA and stenting of the LAD by Dr. Genesis Altamirano with a good result. She did well post-procedure and was   started on aspirin and Brilinta. She was also started on metoprolol. A   fasting lipid profile revealed an LDL cholesterol of 126 with an HDL of   51, and triglycerides of 157. Remainder of labs were unremarkable. Chest x-ray was unremarkable. The patient was watched overnight. She had no problems with her catheterization site and no arrhythmias. The following day she was ambulatory and was felt to have reached   maximal hospital benefit. She was discharged to home with close   outpatient followup arranged. DISCHARGE MEDICATIONS:   1. Aspirin 81 mg daily. 2. Lipitor 40 mg daily. 3. Toprol-XL 25 mg daily. 4. Brilinta 90 mg twice daily. 5. Norvasc 5 mg daily. FOLLOWUP: The patient will follow up with Dr. Opal Seo   approximately 2 weeks post discharge and will also follow up with her   primary care physician.          MD Alo Merrill / Amna Ireland   D:  04/27/2017   09:05   T:  04/27/2017   14:05   Job #:  [id number]

## 2017-04-27 NOTE — PROGRESS NOTES
Pt received discharge instructions and prescriptions, Pt states understanding of follow-up care ans side effects of medications. Pt given opportunity for questions and clarifications. IV removed.  Rojean Leventhal RN

## 2017-04-27 NOTE — PROCEDURES
[facility], [address] [facility]   CORONARY ANGIOGRAPHY       Name:  Shreya Tran   MR#:  [id number]   :  1955   Account #:  [de-identified]        Date of Adm:  2017       ESTIMATED BLOOD LOSS: 10 mL. SPECIMENS REMOVED: None. PRIMARY CARDIOLOGIST: Mitzi Azevedo MD     PRIMARY CARE PHYSICIAN: Fazal Roberson MD    PROCEDURE: Percutaneous coronary intervention with stenting of the   proximal left anterior descending artery. PREOPERATIVE DIAGNOSES   1. Acute unstable angina pectoris. 2. Coronary atherosclerosis. POSTOPERATIVE DIAGNOSES   1. Acute unstable angina pectoris. 2. Coronary atherosclerosis. SEDATION: Versed and fentanyl administered with continuous   supervision, starting 10:42 a.m., and terminating at 11:15 a.m. Diagnostic catheterization performed by Dr. Rupert Leon and reported   separately. TECHNIQUE: A 6 Japanese left 4 guide was engaged in the left coronary   artery. A 0.014 Prowater wire was advanced across the obstruction in   the proximal LAD. Intravenous heparin was administered with ACT   monitored for anticoagulation. Balloon dilatation was performed followed by stenting using a 3.5 x 15   length Alpine drug eluting stent taken to 26 atmospheres for a final   diameter of 3.75 mm. Initial stenosis 95 to 99% stenosis, DALIA 3 flow. Final stenosis 0%, DALIA   3 flow. The sheath was removed and Angio-Seal closure device was placed. DESCRIPTION   LEFT ANTERIOR DESCENDING: The anterior descending artery has   severe stenosis right at the origin and proximal vessel with 95 to 99%   stenosis. The rest of the LAD was free of disease. Upon angioplasty there is  0% residual narrowing, DALIA 3.          MD Yg Peralta / Paris Muse   D:  2017   19:30   T:  2017   20:04   Job #:  [id number]

## 2017-04-27 NOTE — CARDIO/PULMONARY
Cardiopulmonary Rehab Nursing Entry:    Chart reviewed and pt visited. Pt s/p PCI/DARRELL secondary to cc of chest pain, admitted through the ED. Pt with PMHx HTN, non-smoker, has family history of CAD. Printed material given and discussed re: heart healthy habits, the cardiac diet, medication management, what to expect following coronary angioplasty, and post cardiac catheterization instructions. Discussed post catheterization restrictions including no lifting, no tub baths and no straining for 7 days. Also discussed what to do if bleeding or bruising at the cath insertion site is observed. Reviewed the cardiac diet (low NA/fat/CHOL), the importance of medication compliance, monitoring for any unusual signs & symptoms and when to call the doctor. Discussed the benefits of exercise and enrollment in Cardiac Rehab. Pt declined enrollment at this time due to full-time work status. Smoking history was assessed. Pt non-smoker. The pt verbalized understanding.

## 2017-04-28 LAB
BACTERIA SPEC CULT: ABNORMAL
BACTERIA SPEC CULT: ABNORMAL
CC UR VC: ABNORMAL
SERVICE CMNT-IMP: ABNORMAL

## 2017-05-07 ENCOUNTER — HOSPITAL ENCOUNTER (EMERGENCY)
Age: 62
Discharge: HOME | End: 2017-05-07
Attending: EMERGENCY MEDICINE
Payer: COMMERCIAL

## 2017-05-07 ENCOUNTER — APPOINTMENT (OUTPATIENT)
Dept: ULTRASOUND IMAGING | Age: 62
End: 2017-05-07
Attending: EMERGENCY MEDICINE
Payer: COMMERCIAL

## 2017-05-07 VITALS
HEART RATE: 80 BPM | DIASTOLIC BLOOD PRESSURE: 74 MMHG | TEMPERATURE: 97.8 F | HEIGHT: 63 IN | BODY MASS INDEX: 25.82 KG/M2 | RESPIRATION RATE: 16 BRPM | SYSTOLIC BLOOD PRESSURE: 155 MMHG | WEIGHT: 145.72 LBS | OXYGEN SATURATION: 100 %

## 2017-05-07 DIAGNOSIS — M79.661 RIGHT CALF PAIN: Primary | ICD-10-CM

## 2017-05-07 LAB
ALBUMIN SERPL BCP-MCNC: 4.1 G/DL (ref 3.5–5)
ALBUMIN/GLOB SERPL: 1.1 {RATIO} (ref 1.1–2.2)
ALP SERPL-CCNC: 108 U/L (ref 45–117)
ALT SERPL-CCNC: 29 U/L (ref 12–78)
ANION GAP BLD CALC-SCNC: 6 MMOL/L (ref 5–15)
APTT PPP: 24.7 SEC (ref 22.1–32.5)
AST SERPL W P-5'-P-CCNC: 14 U/L (ref 15–37)
BASOPHILS # BLD AUTO: 0 K/UL (ref 0–0.1)
BASOPHILS # BLD: 0 % (ref 0–1)
BILIRUB SERPL-MCNC: 0.3 MG/DL (ref 0.2–1)
BUN SERPL-MCNC: 14 MG/DL (ref 6–20)
BUN/CREAT SERPL: 19 (ref 12–20)
CALCIUM SERPL-MCNC: 9.2 MG/DL (ref 8.5–10.1)
CHLORIDE SERPL-SCNC: 107 MMOL/L (ref 97–108)
CO2 SERPL-SCNC: 28 MMOL/L (ref 21–32)
CREAT SERPL-MCNC: 0.73 MG/DL (ref 0.55–1.02)
EOSINOPHIL # BLD: 0.3 K/UL (ref 0–0.4)
EOSINOPHIL NFR BLD: 4 % (ref 0–7)
ERYTHROCYTE [DISTWIDTH] IN BLOOD BY AUTOMATED COUNT: 13 % (ref 11.5–14.5)
GLOBULIN SER CALC-MCNC: 3.6 G/DL (ref 2–4)
GLUCOSE SERPL-MCNC: 99 MG/DL (ref 65–100)
HCT VFR BLD AUTO: 36.5 % (ref 35–47)
HGB BLD-MCNC: 12.8 G/DL (ref 11.5–16)
INR PPP: 1 (ref 0.9–1.1)
LYMPHOCYTES # BLD AUTO: 35 % (ref 12–49)
LYMPHOCYTES # BLD: 2.6 K/UL (ref 0.8–3.5)
MCH RBC QN AUTO: 30.5 PG (ref 26–34)
MCHC RBC AUTO-ENTMCNC: 35.1 G/DL (ref 30–36.5)
MCV RBC AUTO: 87.1 FL (ref 80–99)
MONOCYTES # BLD: 0.6 K/UL (ref 0–1)
MONOCYTES NFR BLD AUTO: 8 % (ref 5–13)
NEUTS SEG # BLD: 3.9 K/UL (ref 1.8–8)
NEUTS SEG NFR BLD AUTO: 53 % (ref 32–75)
PLATELET # BLD AUTO: 271 K/UL (ref 150–400)
POTASSIUM SERPL-SCNC: 3.8 MMOL/L (ref 3.5–5.1)
PROT SERPL-MCNC: 7.7 G/DL (ref 6.4–8.2)
PROTHROMBIN TIME: 10 SEC (ref 9–11.1)
RBC # BLD AUTO: 4.19 M/UL (ref 3.8–5.2)
SODIUM SERPL-SCNC: 141 MMOL/L (ref 136–145)
THERAPEUTIC RANGE,PTTT: NORMAL SECS (ref 58–77)
WBC # BLD AUTO: 7.4 K/UL (ref 3.6–11)

## 2017-05-07 PROCEDURE — 36415 COLL VENOUS BLD VENIPUNCTURE: CPT | Performed by: EMERGENCY MEDICINE

## 2017-05-07 PROCEDURE — 85025 COMPLETE CBC W/AUTO DIFF WBC: CPT | Performed by: EMERGENCY MEDICINE

## 2017-05-07 PROCEDURE — 80053 COMPREHEN METABOLIC PANEL: CPT | Performed by: EMERGENCY MEDICINE

## 2017-05-07 PROCEDURE — 93971 EXTREMITY STUDY: CPT

## 2017-05-07 PROCEDURE — 85730 THROMBOPLASTIN TIME PARTIAL: CPT | Performed by: EMERGENCY MEDICINE

## 2017-05-07 PROCEDURE — 99282 EMERGENCY DEPT VISIT SF MDM: CPT

## 2017-05-07 PROCEDURE — 85610 PROTHROMBIN TIME: CPT | Performed by: EMERGENCY MEDICINE

## 2017-05-07 RX ORDER — HYDROCODONE BITARTRATE AND ACETAMINOPHEN 5; 325 MG/1; MG/1
1 TABLET ORAL
Qty: 12 TAB | Refills: 0 | Status: SHIPPED | OUTPATIENT
Start: 2017-05-07 | End: 2018-07-20

## 2017-05-07 NOTE — DISCHARGE INSTRUCTIONS
[employer] Activation    Thank you for requesting access to [employer]. Please follow the instructions below to securely access and download your online medical record. [employer] allows you to send messages to your doctor, view your test results, renew your prescriptions, schedule appointments, and more. How Do I Sign Up? 1. In your internet browser, go to [email or web address]  2. Click on the First Time User? Click Here link in the Sign In box. You will be redirect to the New Member Sign Up page. 3. Enter your [employer] Access Code exactly as it appears below. You will not need to use this code after youve completed the sign-up process. If you do not sign up before the expiration date, you must request a new code. [employer] Access Code: [email or web address]  Expires: 2017  4:34 AM (This is the date your [employer] access code will )    4. Enter the last four digits of your Social Security Number (xxxx) and Date of Birth (mm/dd/yyyy) as indicated and click Submit. You will be taken to the next sign-up page. 5. Create a [employer] ID. This will be your [employer] login ID and cannot be changed, so think of one that is secure and easy to remember. 6. Create a [employer] password. You can change your password at any time. 7. Enter your Password Reset Question and Answer. This can be used at a later time if you forget your password. 8. Enter your e-mail address. You will receive e-mail notification when new information is available in [address]. 9. Click Sign Up. You can now view and download portions of your medical record. 10. Click the Download Summary menu link to download a portable copy of your medical information. Additional Information    If you have questions, please visit the Frequently Asked Questions section of the [employer] website at [email or web address] "[employer] [email or web address] Remember, [employer] is NOT to be used for urgent needs. For medical emergencies, dial 911.

## 2017-05-07 NOTE — ED NOTES
Dr Vianca Woodward reviewed discharge instructions with the patient and family. The patient and family verbalized understanding. Pt AAOx4, respirations unlabored and regular, skin warm and dry. Pt denies need for wheelchair at time of discharge.

## 2017-05-07 NOTE — LETTER
[address] 
[facility] EMERGENCY DEPT 
[address] [place] [address] [id number] [phone] Work/School Note Date: 5/7/2017 To Whom It May concern: 
 
Nba Cruz was seen and treated today in the emergency room by the following provider(s): 
Attending Provider: Berny Ramey MD. Nba Cruz may return to work on 5/9/17.  
 
Sincerely,

## 2017-05-07 NOTE — ED PROVIDER NOTES
HPI Comments:   Tara Delvalle is a 63 y.o. female with a hx of HTN and CAD presenting to the ED with her daughter C/O right calf pain which started 2 days ago. Pt states pain developed after she had been on her feet all day and had not been that active since before 04/26/17 when she had a cardiac stent placed. She has rested and elevated the leg over the last 2 days with no improvement and daughter saw her limping around the house today so she brought her to the ED for evaluation. Pt states Dr. Alicja Mcdaniels performed the cardiac cath and told her she had a 95% blockage at that time. She is currently on ASA and Plavix. Patient denies CP, groin pain, or any other symptoms or complaints. PCP: Daniel Zimmer MD  Cardiologist: Dr. Maia Espino    There are no other complaints, changes or physical findings at this time. Written by COSTA Gómezibmeaghan, as dictated by Noemí Ponce MD      The history is provided by the patient and a relative. No  was used. Past Medical History:   Diagnosis Date    CAD (coronary artery disease)     Hypertension        Past Surgical History:   Procedure Laterality Date    BREAST SURGERY PROCEDURE UNLISTED      cystectomy x2    HX CORONARY STENT PLACEMENT  04/26/2017    HX GYN      vaginal wall repair    HX OTHER SURGICAL      surgery for fissure         Family History:   Problem Relation Age of Onset    Heart Disease Mother     Heart Disease Father        Social History     Social History    Marital status:      Spouse name: N/A    Number of children: N/A    Years of education: N/A     Occupational History    Not on file.      Social History Main Topics    Smoking status: Never Smoker    Smokeless tobacco: Not on file    Alcohol use Yes      Comment: occassionally    Drug use: No    Sexual activity: No     Other Topics Concern    Not on file     Social History Narrative         ALLERGIES: Codeine    Review of Systems Constitutional: Negative. Negative for chills and fever. HENT: Negative. Negative for congestion and rhinorrhea. Respiratory: Negative. Negative for cough, chest tightness and wheezing. Cardiovascular: Negative. Negative for chest pain and palpitations. Gastrointestinal: Negative. Negative for abdominal pain, constipation, nausea and vomiting. Endocrine: Negative. Genitourinary: Negative. Negative for decreased urine volume, flank pain, hematuria and pelvic pain. No groin pain   Musculoskeletal: Negative for back pain and neck pain. +Right calf pain   Skin: Negative. Negative for color change, pallor and rash. Neurological: Negative. Negative for dizziness, seizures, weakness, numbness and headaches. Hematological: Negative. Negative for adenopathy. Psychiatric/Behavioral: Negative. All other systems reviewed and are negative. Vitals:    05/07/17 1533 05/07/17 1609   BP:  150/80   Pulse: 80    Resp: 16    Temp: 97.8 °F (36.6 °C)    SpO2: 99%    Weight: 66.1 kg (145 lb 11.6 oz)    Height: 5' 3\" (1.6 m)             Physical Exam   Constitutional: She is oriented to person, place, and time. She appears well-developed and well-nourished. No distress. HENT:   Head: Normocephalic and atraumatic. Mouth/Throat: No oropharyngeal exudate. Eyes: Conjunctivae are normal. Pupils are equal, round, and reactive to light. Right eye exhibits no discharge. Left eye exhibits no discharge. No scleral icterus. Neck: Normal range of motion. Neck supple. No JVD present. Cardiovascular: Normal rate, regular rhythm, normal heart sounds and intact distal pulses. Exam reveals no gallop and no friction rub. No murmur heard. Pulses:       Femoral pulses are 2+ on the right side, and 2+ on the left side. Dorsalis pedis pulses are 2+ on the right side, and 2+ on the left side.    No pain in right groin    No bruit    No compartment signs   Pulmonary/Chest: Effort normal and breath sounds normal. No stridor. No respiratory distress. She has no wheezes. She has no rales. She exhibits no tenderness. Abdominal: Soft. Bowel sounds are normal. She exhibits no distension and no mass. There is no tenderness. There is no rebound and no guarding. Musculoskeletal: Normal range of motion. She exhibits tenderness. She exhibits no edema or deformity. Right hip: Normal.        Right knee: Normal.        Right lower leg: She exhibits tenderness and swelling. She exhibits no bony tenderness, no edema, no deformity and no laceration. Neurological: She is alert and oriented to person, place, and time. She displays normal reflexes. No cranial nerve deficit. She exhibits normal muscle tone. Coordination normal.   Skin: Skin is warm. No rash noted. She is not diaphoretic. No pallor. Nursing note and vitals reviewed. MDM  Number of Diagnoses or Management Options  Right calf pain:   Diagnosis management comments: DDx: pseudo aneurysm, DVT, Baker's cyst, muscle strain, compartment syndrome, ischemic extremity    Impression/Plan: Recent cardiac catheterization 03/26/17 presents with atraumatic right calf pain started Friday. No clinical signs of pseudo aneurysm or pain in the groin. No ischemic changes or signs of compartment syndrome. Pt states she was walking a lot on Friday. Doppler negative. Suspect overuse syndrome.        Amount and/or Complexity of Data Reviewed  Clinical lab tests: ordered and reviewed  Tests in the radiology section of CPT®: ordered and reviewed  Obtain history from someone other than the patient: yes (Daughter)  Review and summarize past medical records: yes  Independent visualization of images, tracings, or specimens: yes    Risk of Complications, Morbidity, and/or Mortality  Presenting problems: moderate  Diagnostic procedures: moderate  Management options: low    Patient Progress  Patient progress: stable    Procedures    PROGRESS NOTE:   5:12 PM  Results discussed with family, re-examined, no ischemic changes, no signs of compartment syndrome, ready for discharge. Written by COSTA Linaresibmeaghan, as dictated by Adeola Claire MD.     LABORATORY TESTS:  Recent Results (from the past 12 hour(s))   CBC WITH AUTOMATED DIFF    Collection Time: 05/07/17  4:16 PM   Result Value Ref Range    WBC 7.4 3.6 - 11.0 K/uL    RBC 4.19 3.80 - 5.20 M/uL    HGB 12.8 11.5 - 16.0 g/dL    HCT 36.5 35.0 - 47.0 %    MCV 87.1 80.0 - 99.0 FL    MCH 30.5 26.0 - 34.0 PG    MCHC 35.1 30.0 - 36.5 g/dL    RDW 13.0 11.5 - 14.5 %    PLATELET 644 719 - 838 K/uL    NEUTROPHILS 53 32 - 75 %    LYMPHOCYTES 35 12 - 49 %    MONOCYTES 8 5 - 13 %    EOSINOPHILS 4 0 - 7 %    BASOPHILS 0 0 - 1 %    ABS. NEUTROPHILS 3.9 1.8 - 8.0 K/UL    ABS. LYMPHOCYTES 2.6 0.8 - 3.5 K/UL    ABS. MONOCYTES 0.6 0.0 - 1.0 K/UL    ABS. EOSINOPHILS 0.3 0.0 - 0.4 K/UL    ABS. BASOPHILS 0.0 0.0 - 0.1 K/UL   METABOLIC PANEL, COMPREHENSIVE    Collection Time: 05/07/17  4:16 PM   Result Value Ref Range    Sodium 141 136 - 145 mmol/L    Potassium 3.8 3.5 - 5.1 mmol/L    Chloride 107 97 - 108 mmol/L    CO2 28 21 - 32 mmol/L    Anion gap 6 5 - 15 mmol/L    Glucose 99 65 - 100 mg/dL    BUN 14 6 - 20 MG/DL    Creatinine 0.73 0.55 - 1.02 MG/DL    BUN/Creatinine ratio 19 12 - 20      GFR est AA >60 >60 ml/min/1.73m2    GFR est non-AA >60 >60 ml/min/1.73m2    Calcium 9.2 8.5 - 10.1 MG/DL    Bilirubin, total 0.3 0.2 - 1.0 MG/DL    ALT (SGPT) 29 12 - 78 U/L    AST (SGOT) 14 (L) 15 - 37 U/L    Alk.  phosphatase 108 45 - 117 U/L    Protein, total 7.7 6.4 - 8.2 g/dL    Albumin 4.1 3.5 - 5.0 g/dL    Globulin 3.6 2.0 - 4.0 g/dL    A-G Ratio 1.1 1.1 - 2.2     PTT    Collection Time: 05/07/17  4:16 PM   Result Value Ref Range    aPTT 24.7 22.1 - 32.5 sec    aPTT, therapeutic range     58.0 - 77.0 SECS   PROTHROMBIN TIME + INR    Collection Time: 05/07/17  4:16 PM   Result Value Ref Range    INR 1.0 0.9 - 1.1      Prothrombin time 10.0 9.0 - 11.1 sec       IMAGING RESULTS:  DUPLEX LOWER EXT VENOUS RIGHT   Final Result   INDICATION: Right leg pain and swelling for 2 days     COMPARISON: None.     FINDINGS: Duplex Doppler sonography of the right lower extremity was performed  from the groin to the calf. The right common femoral, femoral and popliteal  veins are compressible with normal color-flow and wave forms and response to  augmentation.     IMPRESSION  IMPRESSION: No deep venous thrombosis identified. Signed by      Signed Date/Time    Phone Pager     Peyton Pimentel, [address] 5/07/2017 17:01 [phone]           IMPRESSION:  1. Right calf pain        PLAN:  1. Current Discharge Medication List      START taking these medications    Details   HYDROcodone-acetaminophen (NORCO) 5-325 mg per tablet Take 1 Tab by mouth every four (4) hours as needed for Pain. Max Daily Amount: 6 Tabs. Qty: 12 Tab, Refills: 0           2. Follow-up Information     Follow up With Details Comments [facility], [place] Call in 1 day  [address] [place], [place] [id number] [phone]      [facility] EMERGENCY DEPT  If symptoms worsen [address] [address] [address] [facility]  [phone]        Return to ED if worse     Discharge Note:  5:20 PM  The patient is ready for discharge. The patient's signs, symptoms, diagnosis, and discharge instruction have been discussed and the patient has conveyed their understanding. The patient is to follow up as recommended or return to the ER should their symptoms worsen. Plan has been discussed and the patient is in agreement. Written by Nishant Abdi, ED Scribe, as dictated by Malick Griffin MD.     Attestation: This note is prepared by Nishant Abdi, acting as Scribe for Malick Griffin MD.    Malick Griffin MD: The scribe's documentation has been prepared under my direction and personally reviewed by me in its entirety.  I confirm that the note above accurately reflects all work, treatment, procedures, and medical decision making performed by me.

## 2018-07-20 ENCOUNTER — APPOINTMENT (OUTPATIENT)
Dept: GENERAL RADIOLOGY | Age: 63
End: 2018-07-20
Attending: PHYSICIAN ASSISTANT
Payer: COMMERCIAL

## 2018-07-20 ENCOUNTER — HOSPITAL ENCOUNTER (EMERGENCY)
Age: 63
Discharge: HOME | End: 2018-07-20
Attending: EMERGENCY MEDICINE | Admitting: EMERGENCY MEDICINE
Payer: COMMERCIAL

## 2018-07-20 VITALS
BODY MASS INDEX: 25.86 KG/M2 | WEIGHT: 145.94 LBS | HEIGHT: 63 IN | RESPIRATION RATE: 16 BRPM | SYSTOLIC BLOOD PRESSURE: 145 MMHG | TEMPERATURE: 98.7 F | DIASTOLIC BLOOD PRESSURE: 108 MMHG | HEART RATE: 76 BPM | OXYGEN SATURATION: 100 %

## 2018-07-20 DIAGNOSIS — M54.12 CERVICAL RADICULOPATHY: ICD-10-CM

## 2018-07-20 DIAGNOSIS — M54.2 NECK PAIN: Primary | ICD-10-CM

## 2018-07-20 DIAGNOSIS — M50.30 DDD (DEGENERATIVE DISC DISEASE), CERVICAL: ICD-10-CM

## 2018-07-20 PROCEDURE — 99283 EMERGENCY DEPT VISIT LOW MDM: CPT

## 2018-07-20 PROCEDURE — 72050 X-RAY EXAM NECK SPINE 4/5VWS: CPT

## 2018-07-20 PROCEDURE — 96372 THER/PROPH/DIAG INJ SC/IM: CPT

## 2018-07-20 PROCEDURE — 74011250636 HC RX REV CODE- 250/636: Performed by: PHYSICIAN ASSISTANT

## 2018-07-20 PROCEDURE — 74011250637 HC RX REV CODE- 250/637: Performed by: PHYSICIAN ASSISTANT

## 2018-07-20 RX ORDER — DIAZEPAM 5 MG/1
5 TABLET ORAL
Status: COMPLETED | OUTPATIENT
Start: 2018-07-20 | End: 2018-07-20

## 2018-07-20 RX ORDER — TRAMADOL HYDROCHLORIDE 50 MG/1
50 TABLET ORAL
Status: COMPLETED | OUTPATIENT
Start: 2018-07-20 | End: 2018-07-20

## 2018-07-20 RX ORDER — TRAMADOL HYDROCHLORIDE 50 MG/1
50 TABLET ORAL
Qty: 15 TAB | Refills: 0 | Status: SHIPPED | OUTPATIENT
Start: 2018-07-20 | End: 2019-12-13

## 2018-07-20 RX ORDER — DIAZEPAM 5 MG/1
5 TABLET ORAL
Qty: 10 TAB | Refills: 0 | Status: SHIPPED | OUTPATIENT
Start: 2018-07-20 | End: 2019-12-13

## 2018-07-20 RX ORDER — KETOROLAC TROMETHAMINE 30 MG/ML
30 INJECTION, SOLUTION INTRAMUSCULAR; INTRAVENOUS
Status: COMPLETED | OUTPATIENT
Start: 2018-07-20 | End: 2018-07-20

## 2018-07-20 RX ORDER — LIDOCAINE 50 MG/G
1 PATCH TOPICAL
Status: DISCONTINUED | OUTPATIENT
Start: 2018-07-20 | End: 2018-07-20 | Stop reason: HOSPADM

## 2018-07-20 RX ORDER — KETOROLAC TROMETHAMINE 10 MG/1
10 TABLET, FILM COATED ORAL
Qty: 20 TAB | Refills: 0 | Status: SHIPPED | OUTPATIENT
Start: 2018-07-20 | End: 2019-12-13

## 2018-07-20 RX ADMIN — TRAMADOL HYDROCHLORIDE 50 MG: 50 TABLET, FILM COATED ORAL at 15:37

## 2018-07-20 RX ADMIN — KETOROLAC TROMETHAMINE 30 MG: 30 INJECTION, SOLUTION INTRAMUSCULAR at 15:37

## 2018-07-20 RX ADMIN — DIAZEPAM 5 MG: 5 TABLET ORAL at 15:37

## 2018-07-20 NOTE — ED NOTES
FATMATA Troy reviewed discharge instructions with the patient. The patient and spouse verbalized understanding. Patient discharged home with stable vitals. Patient ambulatory out of ED with steady gait.  No further complaints noted

## 2018-07-20 NOTE — DISCHARGE INSTRUCTIONS
Thank you!     Thank you for allowing us to provide you with excellent care today. We hope we addressed all of your concerns and needs. We strive to provide excellent quality care in the Emergency Department. You will receive a survey after your visit to evaluate the care you were provided.      Please rate us a level 5 (excellent), as anything less than excellent does not meet our goals.      If you feel that you have not received excellent quality care or timely care, please ask to speak to the nurse manager. Please choose us in the future for your continued health care needs. ______________________________________________________________________    The exam and treatment you received in the Emergency Department were for an urgent problem and are not intended as complete care. It is important that you follow-up with a doctor, nurse practitioner, or physician assistant to:  (1) confirm your diagnosis,  (2) re-evaluation of changes in your illness and treatment, and  (3) for ongoing care. If your symptoms become worse or you do not improve as expected and you are unable to reach your usual health care provider, you should return to the Emergency Department. We are available 24 hours a day. Take this sheet with you when you go to your follow-up visit. If you have any problem arranging the follow-up visit, contact [address] [place] [address] 21 [phone])    Make an appointment with your Primary Care doctor for follow up of this visit. Return to the ER if you are unable to be seen in the time recommended on your discharge instructions. Cervical Disc Disease: Care Instructions  Your Care Instructions    Cervical disc disease results from damage, disease, or wear and tear to the discs between the bones (vertebra) in your neck. The discs act as shock absorbers for the spine and keep the spine flexible. When a disc is damaged, it can bulge out and press against the nerve roots or spinal cord.  This is sometimes called a herniated or \"slipped disc. \" This pressure can cause pain and numbness or tingling in your arms and hands. It can also cause weakness in your legs. An accident can damage a disc and cause it to break open (rupture). Aging and hard physical work can also cause damage to cervical discs. The first treatments for cervical disc disease include physical therapy, special neck exercises, heat, and pain medicine. If these fail, your doctor may inject steroids and pain medicine into your neck. Surgery is usually done only if other treatments have not worked. Follow-up care is a key part of your treatment and safety. Be sure to make and go to all appointments, and call your doctor if you are having problems. It's also a good idea to know your test results and keep a list of the medicines you take. How can you care for yourself at home? · Take pain medicines exactly as directed. ¨ If the doctor gave you a prescription medicine for pain, take it as prescribed. ¨ If you are not taking a prescription pain medicine, ask your doctor if you can take an over-the-counter medicine. · Don't spend too long in one position. Take short breaks to move around and change positions. · Wear a seat belt and shoulder harness when you are in a car. · Sleep with a pillow under your head and neck that keeps your neck straight. · Follow your doctor's instructions for gentle neck-stretching exercises. · Do not smoke. Smoking can slow healing of your discs. If you need help quitting, talk to your doctor about stop-smoking programs and medicines. These can increase your chances of quitting for good. · Avoid strenuous work or exercise until your doctor says it is okay. When should you call for help? Call 911 anytime you think you may need emergency care.  For example, call if:    · You are unable to move an arm or a leg at all.   [facility] your doctor now or seek immediate medical care if:    · You have new or worse symptoms in your arms, legs, belly, or buttocks. Symptoms may include:  ¨ Numbness or tingling. ¨ Weakness. ¨ Pain.     · You lose bladder or bowel control.    Watch closely for changes in your health, and be sure to contact your doctor if:    · You do not get better as expected. Where can you learn more? Go to http://coretta-mandeep.info/. Enter N118 in the search box to learn more about \"Cervical Disc Disease: Care Instructions. \"  Current as of: November 29, 2017  Content Version: 11.7  © 0091-6395 [employer]. Care instructions adapted under license by [employer] (which disclaims liability or warranty for this information). If you have questions about a medical condition or this instruction, always ask your healthcare professional. [address] any warranty or liability for your use of this information.

## 2018-07-20 NOTE — ED PROVIDER NOTES
EMERGENCY DEPARTMENT HISTORY AND PHYSICAL EXAM 
 
 
Date: 7/20/2018 Patient Name: Kevin Moreno History of Presenting Illness Chief Complaint Patient presents with  Neck Pain Pt. complains of left sided neck pain x2 weeks and has been on prednisone (finished yesterday) and pain came back. Pt. was rx. muscle relaxer without relief. Painful upon palpation History Provided By: Patient HPI: Kevin Moreno, 63 y.o. female with PMHx significant for HTN and CAD, presents ambulatory to the ED with cc of left sided neck pain for the past 2 weeks. Patient states that the pain has been on and off for the past 2 weeks, and worsening in the past 2 days. Per patient she denies any recent injuries, falls, or heavy lifting. She denies history of back or neck problems. She saw her PCP initially when the pain began and was put on a Medrol dosepak. She called 2 days ago back to her PCP due to the pain persisting, and the PCP called in prescription for Flexeril. She presents today due to continued pain unrelieved with medications at home. Patient has also been taking Ibuprofen 800 mg. She denies any headache, vision changes, SOB, chest pain, nausea, vomiting or diarrhea. She states that the pain is primarily located on the left neck and occasionally radiates down her left arm. Chief Complaint: neck pain Duration: 2 Weeks Timing:  Acute Location: neck Quality: Aching Severity: 5 out of 10 Modifying Factors: no relief with steroids or Ibuprofen Associated Symptoms: denies any other associated signs or symptoms There are no other complaints, changes, or physical findings at this time. PCP: Jesenia Dhaliwal MD 
 
Current Facility-Administered Medications Medication Dose Route Frequency Provider Last Rate Last Dose  lidocaine (LIDODERM) 5 % patch 1 Patch  1 Patch TransDERmal NOW Vandana Vasquez PA-C Current Outpatient Prescriptions Medication Sig Dispense Refill  diazePAM (VALIUM) 5 mg tablet Take 1 Tab by mouth every eight (8) hours as needed (Spasm). Max Daily Amount: 15 mg. 10 Tab 0  
 traMADol (ULTRAM) 50 mg tablet Take 1 Tab by mouth every six (6) hours as needed for Pain. Max Daily Amount: 200 mg. 15 Tab 0  
 ketorolac (TORADOL) 10 mg tablet Take 1 Tab by mouth every six (6) hours as needed for Pain. 20 Tab 0  
 aspirin 81 mg chewable tablet Take 1 Tab by mouth daily. 30 Tab 6  
 atorvastatin (LIPITOR) 40 mg tablet Take 1 Tab by mouth nightly. 30 Tab 6  
 metoprolol succinate (TOPROL-XL) 25 mg XL tablet Take 1 Tab by mouth daily. 30 Tab 6  
 ticagrelor (BRILINTA) 90 mg tablet Take 1 Tab by mouth every twelve (12) hours every twelve (12) hours. 60 Tab 6  
 amLODIPine (NORVASC) 5 mg tablet Take 5 mg by mouth daily. Past History Past Medical History: 
Past Medical History:  
Diagnosis Date  CAD (coronary artery disease)  Hypertension Past Surgical History: 
Past Surgical History:  
Procedure Laterality Date  BREAST SURGERY PROCEDURE UNLISTED    
 cystectomy x2  HX CORONARY STENT PLACEMENT  04/26/2017  HX GYN    
 vaginal wall repair  HX OTHER SURGICAL    
 surgery for fissure Family History: 
Family History Problem Relation Age of Onset  Heart Disease Mother  Heart Disease Father Social History: 
Social History Substance Use Topics  Smoking status: Never Smoker  Smokeless tobacco: None  Alcohol use Yes Comment: occassionally Allergies: Allergies Allergen Reactions  Codeine Nausea and Vomiting Review of Systems Review of Systems Constitutional: Negative for chills and fever. HENT: Negative for sore throat. Eyes: Negative for pain. Respiratory: Negative for cough and shortness of breath. Cardiovascular: Negative for chest pain. Gastrointestinal: Negative for abdominal pain, diarrhea, nausea and vomiting. Genitourinary: Negative for dysuria and hematuria. Musculoskeletal: Positive for neck pain. Negative for arthralgias and myalgias. Skin: Negative for rash. Neurological: Negative for dizziness, light-headedness, numbness and headaches. Psychiatric/Behavioral: Negative for behavioral problems and confusion. Physical Exam  
Physical Exam  
Constitutional: She is oriented to person, place, and time. She appears well-developed and well-nourished. No distress. HENT:  
Head: Normocephalic and atraumatic. Right Ear: External ear normal.  
Left Ear: External ear normal.  
Nose: Nose normal.  
Eyes: Conjunctivae and EOM are normal.  
Neck: Normal range of motion and full passive range of motion without pain. Neck supple. Spinous process tenderness and muscular tenderness present. No rigidity. No edema, no erythema and normal range of motion present. Cardiovascular: Normal rate, regular rhythm and normal heart sounds. No murmur heard. Pulmonary/Chest: Effort normal and breath sounds normal. She has no decreased breath sounds. She has no wheezes. Abdominal: Soft. Bowel sounds are normal. She exhibits no distension. There is no tenderness. There is no guarding. Musculoskeletal: Normal range of motion. She exhibits no edema or tenderness. Neurological: She is alert and oriented to person, place, and time. She has normal strength. No cranial nerve deficit or sensory deficit. Coordination and gait normal. GCS eye subscore is 4. GCS verbal subscore is 5. GCS motor subscore is 6. No focal neuro deficits. NVI. Neurologically intact of UE and LE B/L Sensation intact and symmetrical of UE and LE B/L. Strength 5/5 of UE B/L, Strength 5/5 of LE B/L. Symmetric bulk and tone of LE muscle groups. Skin: Skin is warm and dry. No rash noted. She is not diaphoretic. Psychiatric: She has a normal mood and affect. Her behavior is normal. Judgment normal.  
Nursing note and vitals reviewed. Diagnostic Study Results Labs -  No results found for this or any previous visit (from the past 12 hour(s)). Radiologic Studies -  
XR SPINE CERV 4 OR 5 V Final Result EXAM: XR SPINE CERV 4 OR 5 V 
  INDICATION: pain x 2 weeks 
  
COMPARISON: None. 
  
FINDINGS: AP, lateral, swimmers lateral, bilateral oblique and open mouth 
odontoid views of the cervical spine were obtained. The alignment is normal.    
Vertebral body heights are maintained. Disc space heights are maintained. Small 
anterior osteophytes are present at several levels. There is no fracture or 
subluxation. The prevertebral soft tissues are normal. The odontoid process is 
intact and the C1-C2 relationship is normal.   The neural foramina are 
symmetrical.  Degenerative changes are seen in the posterior facet joints. 
  
IMPRESSION IMPRESSION:  Minimal spondylosis. Multilevel posterior facet arthropathy. No 
acute process. Medical Decision Making I am the first provider for this patient. I reviewed the vital signs, available nursing notes, past medical history, past surgical history, family history and social history. Vital Signs-Reviewed the patient's vital signs. Patient Vitals for the past 12 hrs: 
 Temp Pulse Resp BP SpO2  
07/20/18 1505 98.7 °F (37.1 °C) 76 16 (!) 145/108 100 % Records Reviewed: Nursing Notes and Old Medical Records Provider Notes (Medical Decision Making): DDX: strain, sprain, contusion, compression fracture, cervical radiculopathy, spinal spondylosis vs spondylolisthesis, spinal stenosis. No concern for meningitis or other infectious cause. Patient presents with neck pain with radiating symptoms. Will assess with xray and control pain. ED Course:  
Initial assessment performed. The patients presenting problems have been discussed, and they are in agreement with the care plan formulated and outlined with them. I have encouraged them to ask questions as they arise throughout their visit. 4:20 PM 
I have just reevaluated the patient. I have reviewed her vital signs and determined there is currently no worsening in their condition or physical exam. Results have been reviewed with them and their questions have been answered. Disposition: 
DISCHARGE NOTE: 
4:30 PM 
The care plan has been outline with the patient and/or family, who verbally conveyed understanding and agreement. Available results have been reviewed. Patient and/or family understand the follow up plan as outlined and discharge instructions. Should their condition deterioration at any time after discharge the patient agrees to return, follow up sooner than outlined or seek medical assistance at the closest Emergency Room as soon as possible. Questions have been answered. Discharge instructions and educational information regarding the patient's diagnosis as well a list of reasons why the patient would want to seek immediate medical attention, should their condition change, were reviewed directly with the patient/family. PLAN: 
1. Discharge home 2. Medications as directed 3. Schedule f/u with Ortho 4. Return precautions reviewed Discharge Medication List as of 7/20/2018  4:21 PM  
  
START taking these medications Details  
diazePAM (VALIUM) 5 mg tablet Take 1 Tab by mouth every eight (8) hours as needed (Spasm). Max Daily Amount: 15 mg., Print, Disp-10 Tab, R-0  
  
traMADol (ULTRAM) 50 mg tablet Take 1 Tab by mouth every six (6) hours as needed for Pain. Max Daily Amount: 200 mg., Print, Disp-15 Tab, R-0  
  
ketorolac (TORADOL) 10 mg tablet Take 1 Tab by mouth every six (6) hours as needed for Pain., Normal, Disp-20 Tab, R-0  
  
  
CONTINUE these medications which have NOT CHANGED Details  
aspirin 81 mg chewable tablet Take 1 Tab by mouth daily. , Print, Disp-30 Tab, R-6  
  
atorvastatin (LIPITOR) 40 mg tablet Take 1 Tab by mouth nightly. , Print, Disp-30 Tab, R-6  
  
metoprolol succinate (TOPROL-XL) 25 mg XL tablet Take 1 Tab by mouth daily. , Print, Disp-30 Tab, R-6  
  
ticagrelor (BRILINTA) 90 mg tablet Take 1 Tab by mouth every twelve (12) hours every twelve (12) hours. , Print, Disp-60 Tab, R-6  
  
amLODIPine (NORVASC) 5 mg tablet Take 5 mg by mouth daily. , Historical Med  
  
  
 
 
5.  
Follow-up Information Follow up With Details Comments Contact Info Ryan Giordano MD Schedule an appointment as soon as possible for a visit  [facility] [address] [facility] [id number] 
[phone] [facility] EMERGENCY DEPT  As needed, If symptoms worsen [address] 
[phone] Return to ED if worse Diagnosis Clinical Impression: 1. Neck pain 2. DDD (degenerative disc disease), cervical   
3. Cervical radiculopathy This note will not be viewable in [address]

## 2018-07-20 NOTE — ED NOTES
Patient presents to ED with complaints of left sided neck pain. Patient states the pain has been going on for 2 weeks, she saw her PCP who px her with prednisone. Patient states she completed the medication yesterday and noticed that the pain is getting worse. Patient denies numbness and tingling.

## 2019-10-24 ENCOUNTER — HOSPITAL ENCOUNTER (OUTPATIENT)
Dept: GENERAL RADIOLOGY | Age: 64
Discharge: HOME | End: 2019-10-24
Payer: COMMERCIAL

## 2019-10-24 DIAGNOSIS — I20.0 INTERMEDIATE CORONARY SYNDROME (HCC): ICD-10-CM

## 2019-10-24 DIAGNOSIS — I25.10 CARDIOVASCULAR DISEASE: ICD-10-CM

## 2019-10-24 PROCEDURE — 71046 X-RAY EXAM CHEST 2 VIEWS: CPT

## 2019-10-28 ENCOUNTER — HOSPITAL ENCOUNTER (OUTPATIENT)
Age: 64
Discharge: HOME | End: 2019-10-29
Attending: INTERNAL MEDICINE | Admitting: INTERNAL MEDICINE
Payer: COMMERCIAL

## 2019-10-28 DIAGNOSIS — R07.9 CHEST PAIN, UNSPECIFIED TYPE: ICD-10-CM

## 2019-10-28 LAB
ATRIAL RATE: 65 BPM
CALCULATED P AXIS, ECG09: 32 DEGREES
CALCULATED R AXIS, ECG10: 32 DEGREES
CALCULATED T AXIS, ECG11: 28 DEGREES
DIAGNOSIS, 93000: NORMAL
P-R INTERVAL, ECG05: 164 MS
Q-T INTERVAL, ECG07: 416 MS
QRS DURATION, ECG06: 92 MS
QTC CALCULATION (BEZET), ECG08: 432 MS
VENTRICULAR RATE, ECG03: 65 BPM

## 2019-10-28 PROCEDURE — C1874 STENT, COATED/COV W/DEL SYS: HCPCS | Performed by: INTERNAL MEDICINE

## 2019-10-28 PROCEDURE — C1760 CLOSURE DEV, VASC: HCPCS | Performed by: INTERNAL MEDICINE

## 2019-10-28 PROCEDURE — 92978 ENDOLUMINL IVUS OCT C 1ST: CPT | Performed by: INTERNAL MEDICINE

## 2019-10-28 PROCEDURE — 85347 COAGULATION TIME ACTIVATED: CPT

## 2019-10-28 PROCEDURE — 74011250636 HC RX REV CODE- 250/636: Performed by: INTERNAL MEDICINE

## 2019-10-28 PROCEDURE — 93458 L HRT ARTERY/VENTRICLE ANGIO: CPT | Performed by: INTERNAL MEDICINE

## 2019-10-28 PROCEDURE — 99153 MOD SED SAME PHYS/QHP EA: CPT | Performed by: INTERNAL MEDICINE

## 2019-10-28 PROCEDURE — 92928 PRQ TCAT PLMT NTRAC ST 1 LES: CPT | Performed by: INTERNAL MEDICINE

## 2019-10-28 PROCEDURE — 74011000250 HC RX REV CODE- 250: Performed by: INTERNAL MEDICINE

## 2019-10-28 PROCEDURE — 99152 MOD SED SAME PHYS/QHP 5/>YRS: CPT | Performed by: INTERNAL MEDICINE

## 2019-10-28 PROCEDURE — C1769 GUIDE WIRE: HCPCS | Performed by: INTERNAL MEDICINE

## 2019-10-28 PROCEDURE — C1894 INTRO/SHEATH, NON-LASER: HCPCS | Performed by: INTERNAL MEDICINE

## 2019-10-28 PROCEDURE — C1725 CATH, TRANSLUMIN NON-LASER: HCPCS | Performed by: INTERNAL MEDICINE

## 2019-10-28 PROCEDURE — C1887 CATHETER, GUIDING: HCPCS | Performed by: INTERNAL MEDICINE

## 2019-10-28 PROCEDURE — 93005 ELECTROCARDIOGRAM TRACING: CPT

## 2019-10-28 PROCEDURE — 77030028837 HC SYR ANGI PWR INJ COEU -A: Performed by: INTERNAL MEDICINE

## 2019-10-28 PROCEDURE — 77030029065 HC DRSG HEMO QCLOT ZMED -B: Performed by: INTERNAL MEDICINE

## 2019-10-28 PROCEDURE — 74011250637 HC RX REV CODE- 250/637: Performed by: INTERNAL MEDICINE

## 2019-10-28 PROCEDURE — 74011636320 HC RX REV CODE- 636/320: Performed by: INTERNAL MEDICINE

## 2019-10-28 PROCEDURE — 74011000258 HC RX REV CODE- 258: Performed by: INTERNAL MEDICINE

## 2019-10-28 PROCEDURE — 77030013529 HC DEV PLLBK SLED BSC -B: Performed by: INTERNAL MEDICINE

## 2019-10-28 PROCEDURE — C1753 CATH, INTRAVAS ULTRASOUND: HCPCS | Performed by: INTERNAL MEDICINE

## 2019-10-28 DEVICE — STENT RONYX35022UX RESOLUTE ONYX 3.50X22
Type: IMPLANTABLE DEVICE | Status: FUNCTIONAL
Brand: RESOLUTE ONYX™

## 2019-10-28 RX ORDER — SODIUM CHLORIDE 0.9 % (FLUSH) 0.9 %
5-40 SYRINGE (ML) INJECTION EVERY 8 HOURS
Status: DISCONTINUED | OUTPATIENT
Start: 2019-10-28 | End: 2019-10-29 | Stop reason: HOSPADM

## 2019-10-28 RX ORDER — NITROGLYCERIN 0.4 MG/1
0.4 TABLET SUBLINGUAL
Status: DISCONTINUED | OUTPATIENT
Start: 2019-10-28 | End: 2019-10-29 | Stop reason: HOSPADM

## 2019-10-28 RX ORDER — LORAZEPAM 0.5 MG/1
0.5 TABLET ORAL
Status: DISCONTINUED | OUTPATIENT
Start: 2019-10-28 | End: 2019-10-29 | Stop reason: HOSPADM

## 2019-10-28 RX ORDER — METOPROLOL SUCCINATE 25 MG/1
25 TABLET, EXTENDED RELEASE ORAL DAILY
Status: DISCONTINUED | OUTPATIENT
Start: 2019-10-29 | End: 2019-10-29 | Stop reason: HOSPADM

## 2019-10-28 RX ORDER — HEPARIN SODIUM 200 [USP'U]/100ML
INJECTION, SOLUTION INTRAVENOUS
Status: COMPLETED | OUTPATIENT
Start: 2019-10-28 | End: 2019-10-28

## 2019-10-28 RX ORDER — SODIUM CHLORIDE 9 MG/ML
100 INJECTION, SOLUTION INTRAVENOUS CONTINUOUS
Status: DISPENSED | OUTPATIENT
Start: 2019-10-28 | End: 2019-10-29

## 2019-10-28 RX ORDER — AMLODIPINE BESYLATE 5 MG/1
5 TABLET ORAL DAILY
Status: DISCONTINUED | OUTPATIENT
Start: 2019-10-29 | End: 2019-10-29 | Stop reason: HOSPADM

## 2019-10-28 RX ORDER — DIAZEPAM 5 MG/1
5 TABLET ORAL
Status: DISCONTINUED | OUTPATIENT
Start: 2019-10-28 | End: 2019-10-29 | Stop reason: HOSPADM

## 2019-10-28 RX ORDER — GUAIFENESIN 100 MG/5ML
81 LIQUID (ML) ORAL ONCE
Status: COMPLETED | OUTPATIENT
Start: 2019-10-28 | End: 2019-10-28

## 2019-10-28 RX ORDER — NALOXONE HYDROCHLORIDE 0.4 MG/ML
0.4 INJECTION, SOLUTION INTRAMUSCULAR; INTRAVENOUS; SUBCUTANEOUS AS NEEDED
Status: DISCONTINUED | OUTPATIENT
Start: 2019-10-28 | End: 2019-10-29 | Stop reason: HOSPADM

## 2019-10-28 RX ORDER — ACETAMINOPHEN 325 MG/1
650 TABLET ORAL
Status: DISCONTINUED | OUTPATIENT
Start: 2019-10-28 | End: 2019-10-29 | Stop reason: HOSPADM

## 2019-10-28 RX ORDER — ATORVASTATIN CALCIUM 40 MG/1
80 TABLET, FILM COATED ORAL
Status: DISCONTINUED | OUTPATIENT
Start: 2019-10-28 | End: 2019-10-29 | Stop reason: HOSPADM

## 2019-10-28 RX ORDER — SODIUM CHLORIDE 0.9 % (FLUSH) 0.9 %
5-40 SYRINGE (ML) INJECTION AS NEEDED
Status: DISCONTINUED | OUTPATIENT
Start: 2019-10-28 | End: 2019-10-29 | Stop reason: HOSPADM

## 2019-10-28 RX ORDER — LIDOCAINE HYDROCHLORIDE 10 MG/ML
INJECTION, SOLUTION EPIDURAL; INFILTRATION; INTRACAUDAL; PERINEURAL AS NEEDED
Status: DISCONTINUED | OUTPATIENT
Start: 2019-10-28 | End: 2019-10-28 | Stop reason: HOSPADM

## 2019-10-28 RX ORDER — ONDANSETRON 2 MG/ML
4 INJECTION INTRAMUSCULAR; INTRAVENOUS
Status: DISCONTINUED | OUTPATIENT
Start: 2019-10-28 | End: 2019-10-29 | Stop reason: HOSPADM

## 2019-10-28 RX ORDER — MIDAZOLAM HYDROCHLORIDE 1 MG/ML
INJECTION, SOLUTION INTRAMUSCULAR; INTRAVENOUS AS NEEDED
Status: DISCONTINUED | OUTPATIENT
Start: 2019-10-28 | End: 2019-10-28 | Stop reason: HOSPADM

## 2019-10-28 RX ORDER — GUAIFENESIN 100 MG/5ML
81 LIQUID (ML) ORAL DAILY
Status: DISCONTINUED | OUTPATIENT
Start: 2019-10-29 | End: 2019-10-29 | Stop reason: HOSPADM

## 2019-10-28 RX ORDER — FENTANYL CITRATE 50 UG/ML
INJECTION, SOLUTION INTRAMUSCULAR; INTRAVENOUS AS NEEDED
Status: DISCONTINUED | OUTPATIENT
Start: 2019-10-28 | End: 2019-10-28 | Stop reason: HOSPADM

## 2019-10-28 RX ADMIN — ATORVASTATIN CALCIUM 80 MG: 40 TABLET, FILM COATED ORAL at 21:13

## 2019-10-28 RX ADMIN — ACETAMINOPHEN 650 MG: 325 TABLET ORAL at 15:43

## 2019-10-28 RX ADMIN — Medication 10 ML: at 13:26

## 2019-10-28 RX ADMIN — Medication 10 ML: at 22:44

## 2019-10-28 RX ADMIN — ASPIRIN 81 MG 81 MG: 81 TABLET ORAL at 09:57

## 2019-10-28 RX ADMIN — TICAGRELOR 90 MG: 90 TABLET ORAL at 22:44

## 2019-10-28 RX ADMIN — ACETAMINOPHEN 650 MG: 325 TABLET ORAL at 21:13

## 2019-10-28 NOTE — Clinical Note
Lesion: Located in the Proximal LAD. Stent inserted. Stent deployed. Single technique used. First inflation pressure = 18 paul; inflation time: 18 sec.

## 2019-10-28 NOTE — CARDIO/PULMONARY
Cardiac Rehab Note: chart review Consult has been acknowledged Cath with stent Smoking history assessed. Patient is a non smoker. CAD education folder, with heart heathy diet, warning signs, heart facts, catheterization brochure, and out patient cardiac rehab program provided to Del Woody on 10/28/19. Educated using teach back method. Reviewed CAD diagnosis definition and purpose of intervention. Discussed risk factors for CAD to include the following: family history, elevated BMI, hyperlipidemia, hypertension, diabetes, and stress. Discussed Heart Healthy/Low Sodium (less than 2000 mg) diet. Reviewed the importance of medication compliance, follow up appointments with cardiologist, signs and symptoms of angina, and what to report to physician after discharge. Emphasized the value of cardiac rehab. Discussed Cardiac Rehab Program format, benefits, and encouraged enrollment to assist with risk modification and management. Patient declined stating that she walks her dogs every morning and evening, lives with a [employer] fanatic\", [place]. After discussing her current lifestyle, she does believe stress is still a problem. Suggestions for stress reduction were discussed. Del Woody verbalized understanding with questions answered.   Av Rice RN

## 2019-10-28 NOTE — PROGRESS NOTES
LHC, cor angio, LV gram completed s complications    [place]    Findings    1- high grade restonotic lesion of previously stented prox LAD.  No signif dz elsewhere  2- preserved LV systolic fxn  3- nl LVEDP    Plan    PCI per Dr Ashish Kerr

## 2019-10-28 NOTE — Clinical Note
Single view of the left ventricle obtained using power injection. Total volume = 39 mL. Rate = 13 mL/sec.

## 2019-10-28 NOTE — Clinical Note
Lesion: Located in the Proximal LAD. IVUS catheter inserted and obtaining images. Lesion reference area = 3.75 mm.

## 2019-10-28 NOTE — Clinical Note
Lesion located in the Proximal LAD. Balloon inserted. Balloon inflated using multiple inflations inflation technique. Pressure = 14 paul; Duration = 15 sec. Inflation 2: Pressure: 16 paul; Duration: 15 sec.

## 2019-10-28 NOTE — Clinical Note
TRANSFER - OUT REPORT:  
 
Verbal report given to: Ted Ballard and Semmle. Report consisted of patient's Situation, Background, Assessment and  
Recommendations(SBAR). Opportunity for questions and clarification was provided. Patient transported with a Registered Nurse. Patient transported to: IVCU.

## 2019-10-28 NOTE — Clinical Note
Lesion located in the Proximal LAD. Balloon inserted. Balloon inflated using multiple inflations inflation technique. Pressure = 16 paul; Duration = 30 sec. Inflation 2: Pressure: 16 paul; Duration: 10 sec. Inflation 3: Pressure: 16 paul; Duration: 20 sec.

## 2019-10-29 VITALS
WEIGHT: 150 LBS | TEMPERATURE: 97.9 F | HEART RATE: 78 BPM | DIASTOLIC BLOOD PRESSURE: 66 MMHG | OXYGEN SATURATION: 98 % | RESPIRATION RATE: 16 BRPM | BODY MASS INDEX: 26.58 KG/M2 | HEIGHT: 63 IN | SYSTOLIC BLOOD PRESSURE: 123 MMHG

## 2019-10-29 LAB
ANION GAP SERPL CALC-SCNC: 10 MMOL/L (ref 5–15)
ATRIAL RATE: 83 BPM
BUN SERPL-MCNC: 13 MG/DL (ref 6–20)
BUN/CREAT SERPL: 24 (ref 12–20)
CALCIUM SERPL-MCNC: 8.5 MG/DL (ref 8.5–10.1)
CALCULATED P AXIS, ECG09: 16 DEGREES
CALCULATED R AXIS, ECG10: 22 DEGREES
CALCULATED T AXIS, ECG11: 25 DEGREES
CHLORIDE SERPL-SCNC: 109 MMOL/L (ref 97–108)
CO2 SERPL-SCNC: 23 MMOL/L (ref 21–32)
CREAT SERPL-MCNC: 0.54 MG/DL (ref 0.55–1.02)
DIAGNOSIS, 93000: NORMAL
GLUCOSE SERPL-MCNC: 111 MG/DL (ref 65–100)
P-R INTERVAL, ECG05: 166 MS
POTASSIUM SERPL-SCNC: 3.9 MMOL/L (ref 3.5–5.1)
Q-T INTERVAL, ECG07: 374 MS
QRS DURATION, ECG06: 90 MS
QTC CALCULATION (BEZET), ECG08: 439 MS
SODIUM SERPL-SCNC: 142 MMOL/L (ref 136–145)
VENTRICULAR RATE, ECG03: 83 BPM

## 2019-10-29 PROCEDURE — 93005 ELECTROCARDIOGRAM TRACING: CPT

## 2019-10-29 PROCEDURE — 36415 COLL VENOUS BLD VENIPUNCTURE: CPT

## 2019-10-29 PROCEDURE — 80048 BASIC METABOLIC PNL TOTAL CA: CPT

## 2019-10-29 PROCEDURE — 74011250637 HC RX REV CODE- 250/637: Performed by: INTERNAL MEDICINE

## 2019-10-29 RX ORDER — NITROGLYCERIN 0.4 MG/1
0.4 TABLET SUBLINGUAL
Qty: 1 BOTTLE | Refills: 5 | OUTPATIENT
Start: 2019-10-29 | End: 2021-03-23

## 2019-10-29 RX ADMIN — ASPIRIN 81 MG 81 MG: 81 TABLET ORAL at 08:11

## 2019-10-29 RX ADMIN — TICAGRELOR 90 MG: 90 TABLET ORAL at 08:11

## 2019-10-29 RX ADMIN — METOPROLOL SUCCINATE 25 MG: 25 TABLET, EXTENDED RELEASE ORAL at 08:11

## 2019-10-29 RX ADMIN — Medication 10 ML: at 06:10

## 2019-10-29 RX ADMIN — AMLODIPINE BESYLATE 5 MG: 5 TABLET ORAL at 08:11

## 2019-10-29 NOTE — PROCEDURES
[facility] CATH    Name:  Zander Valdovinos  MR#:  [id number]  :  1955  ACCOUNT #:  [de-identified]  DATE OF SERVICE:  10/28/2019    PROCEDURES PERFORMED:  1. PTCA and stenting of the ostial left anterior descending with a drug eluting stent. 2.  Intravascular ultrasound. 3.  Sedation. Sedation duration was 58 minutes. Sedation was administered by cath lab staff and I supervised them as I monitored the patient for the duration of the procedure. PREOPERATIVE DIAGNOSES:  Atherosclerosis of the native coronary arteries with angina. POSTOPERATIVE DIAGNOSES:  Atherosclerosis of the native coronary arteries with angina. SURGEON:  Dennis Mortensen MD    ASSISTANT:  None. ESTIMATED BLOOD LOSS:  Less than 50 mL. SPECIMENS REMOVED:  None. COMPLICATIONS:  None. IMPLANTS:  [place] stent and a 6-[place] Islander Angio-Seal.      PROCEDURE:  The patient underwent a diagnostic cardiac catheterization by Dr. Anais Tucker and remained on the table for percutaneous coronary intervention. The patient was anticoagulated with intravenous bivalirudin. A 5-[place] Islander sheath placed by Dr. Anais Tucker was upsized to 6-[place] Islander. A 6-[place] Islander EBU 3.5 guiding catheter was advanced to the ostium of the left main. A 0.014 inch Asahi Prowater wire was advanced down the circumflex. A 0.014 inch Runthrough was advanced down the left anterior descending. The LAD stent was predilated with a 2.5 balloon. Following this, IVUS was performed on the left anterior descending. There appeared to be stenosis distal to the stented segment and the stent length was measured to be 22 mm. The distal vessel was measured at 3.75 mm. The proximal vessel was smaller. The previous stent appeared to be actually well apposed, but certainly had a significant in-stent restenosis. Following IVUS, a 3.5 x 22 [place] was deployed at the proximal edge. It was deployed in the same position distally and extended further.   The stent was deployed at 18 atmospheres. The stent was then post dilated with a 3.75 NC taken up to 16 atmospheres. At this point, there was 0% residual stenosis with prompt DALIA 3 opacification of the distal vessel. A repeat IVUS was performed. The distal stent was well apposed. There did not appear to be any significant disease post the stents and the distal vessel was 3.75 mm. Interestingly, the proximal stent, which was also well apposed, the vessel size is 3.0 mm. The lumen was certainly satisfactory. The entire stented segment appeared to be well apposed and was felt to be having excellent results. The femoral angiography revealed common femoral arteriotomy and a 6-Austrian Angio-Seal was used for hemostasis. CONCLUSION:  1. Successful PTCA and stenting of a 90% in-stent restenosis of the ostial LAD, previous stent was a 3.5 x 15 [place] deployed at 16 atmospheres, new stent was a 3.5 x 22 [place], deployed at 18 atmospheres, post dilated to 3.75 mm with NC balloon up to 16 atmospheres. IVUS was used for stent optimization. Initial DALIA flow was II, final DALIA flow was III. 2.  The patient will continue aspirin therapy indefinitely, Brilinta for a minimum of 1 year. Would consider switching to Plavix at the end of 1 year. Bivalirudin was used during the time of the procedure.   Hemostasis was via Chloe Almanza MD [id number]  D:  10/28/2019 12:53  T:  10/29/2019 8:42  JOB #:  [id number]

## 2019-10-29 NOTE — PROGRESS NOTES
0700- assumed care of pt this am. Pt is alert and oriented resting in bed at this time. Pt bilateral groin sites are CDI with no SS of hematoma, positive pulses in BLE.  1028- discharge information has been reviewed with family and patient. Pt nor family have any questions at this time. Pt is now ready for discharge.

## 2019-10-29 NOTE — DISCHARGE INSTRUCTIONS
Cardiology Discharge Summary     Patient ID:  Lacie Telles  [id number]  51 y.o.  1955    Admit Date: 10/28/2019    Discharge Date: 10/29/2019     Admitting Physician: Juana Grajeda MD     Discharge Physician: Juana Grajeda MD        Patient Instructions:         Referenced discharge instructions provided by nursing for diet and activity. Follow-up with Dr Lester Waters 3-4 weeks     Signed:  Juana Grajeda MD  10/29/2019  9:54 AM   Cardiac Catheterization  Discharge Instructions        Do not drive, operate any machinery, or sign any legal documents for 24 hours after your procedure. You must have someone to drive you home.  You may take a shower 24 hours after your cardiac catheterization. Be sure to get the dressing wet and then remove it; gently wash the area with warm soapy water. Pat dry and leave open to air. To help prevent infections, be sure to keep the cath site clean and dry. No lotions, creams, powders, ointments, etc. in the cath site for approximately 1 week.  Do not take a tub bath, get in a hot tub or swimming pool for approximately 5 days or until the cath site is completely healed.  No strenuous activity or heavy lifting over 10 lbs. for  2 days.  Drink plenty of fluids for 24-48 hours after your cath to flush the contrast dye from your kidneys. No alcoholic beverages for 24 hours. You may resume your previous diet after your cath.  After your cath, some bruising or discomfort is common during the healing process. Tylenol, 1-2 tablets every 6 hours as needed, is recommended if you experience any discomfort.   If you experience any signs or symptoms of infection such as fever, chills, or poorly healing incision, persistent tenderness or swelling in the groin, redness and/or warmth to the touch, numbness, significant tingling or pain at the groin site or affected extremity, rash, drainage from the cath site, or if the leg feels tight or swollen, call your physician right away.  If bleeding at the cath site occurs, take a clean gauze pad and apply direct pressure to the groin just above the puncture site. Call 911 immediately, and continue to apply direct pressure until an ambulance gets to your location.  You may return to work  3  days after your cardiac cath.  The day after your procedure, call your doctors office for a follow-up appointment in the office for  3-4  weeks,  unless previously arranged. Nurse Signature Date      [address] [place] [address]   [phone]  [place], [address] [facility]    [email or web address]

## 2019-10-29 NOTE — PROGRESS NOTES
Bedside and Verbal shift change report given to [address] (oncoming nurse) by Ebony Warren (offgoing nurse). Report included the following information SBAR, Kardex, Intake/Output and Cardiac Rhythm NSR.   1900 Pt lying in bed, denies pain although states cath site is  \"sore\"  2000 PRN tylenol given for pain  2100 Pt states tylenol helped  2300 No change from previous assessment  0300 Pt asleep in bed, easily awoken, denies pain, AM labs drawn/sent  Bedside and Verbal shift change report given to Nimco Dominguez 1313 (oncoming nurse) by William Chambers RN (offgoing nurse). Report included the following information SBAR, Kardex, Intake/Output and Cardiac Rhythm NSR.

## 2019-10-30 NOTE — PROCEDURES
[facility] CATH    Name:  Candi Lara  MR#:  [id number]  :  1955  ACCOUNT #:  [de-identified]  DATE OF SERVICE:  10/28/2019    PROCEDURES PERFORMED:  Left heart catheterization, coronary angiography, left ventriculography. SURGEON:  Terrell Maloney MD    ESTIMATED BLOOD LOSS:  Less than 15 mL. SPECIMENS REMOVED:  None. COMPLICATIONS:  None. ANESTHESIA:  concious sedation     INDICATIONS:  Unstable angina. TECHNIQUE:  Right femoral artery via Charley. CATHETERS USED:  5-Palauan pigtail, 5-Palauan JL4, 5-Palauan JR4. MEDICATIONS USED:  Please see the separate cath lab log sheet. FINDINGS:  1. Hemodynamics:  Aortic pressure was 125/55 with a mean of 64. Left ventricular pressure was 105/3 with an LVEDP of 10. There was no significant gradient on pullback across the aortic valve. 2.  Left ventriculography done from the COE view revealed preserved LV systolic function with an estimated EF of 60%-65%. No obvious wall motion abnormalities were seen. No significant mitral regurgitation was seen. 3.  Coronary angiography revealed a right dominant system. The left main was a moderate-sized vessel without significant disease. The previously stented area in the ostial and proximal LAD had a 90% focal area of in-stent restenosis. The remainder of the LAD had no significant disease. There were two moderate sized diagonal branches of the LAD without significant disease. There was a small first marginal branch without significant disease. OM2 and OM3 of the mid circumflex were both large vessels without significant disease. AV groove circumflex itself had no significant disease. The right coronary artery was a large dominant vessel, which bifurcated into a large PDA and large posterolateral branch. This vessel had no significant disease. CONCLUSIONS:  1.   Significant one-vessel coronary artery disease with restenosis of a previously stented area in the proximal left anterior descending coronary artery. 2.  Well-preserved left ventricular systolic function. 3.  Normal left ventricular end-diastolic pressure. RECOMMENDATIONS:  The patient will be considered for percutaneous intervention, which will be reported separately.         MD JASVIR [id number]  D:  10/29/2019 17:36  T:  10/30/2019 6:38  JOB #:  [id number]

## 2019-11-04 LAB — ACT BLD: 285 SECS (ref 79–138)

## 2019-12-13 ENCOUNTER — OFFICE VISIT (OUTPATIENT)
Dept: CARDIOLOGY CLINIC | Age: 64
End: 2019-12-13

## 2019-12-13 VITALS
BODY MASS INDEX: 26.75 KG/M2 | RESPIRATION RATE: 16 BRPM | HEIGHT: 63 IN | DIASTOLIC BLOOD PRESSURE: 84 MMHG | SYSTOLIC BLOOD PRESSURE: 144 MMHG | HEART RATE: 82 BPM | OXYGEN SATURATION: 98 % | WEIGHT: 151 LBS

## 2019-12-13 DIAGNOSIS — R07.9 CHEST PAIN, UNSPECIFIED TYPE: Primary | ICD-10-CM

## 2019-12-13 DIAGNOSIS — E78.2 MIXED HYPERLIPIDEMIA: ICD-10-CM

## 2019-12-13 DIAGNOSIS — R53.83 FATIGUE, UNSPECIFIED TYPE: ICD-10-CM

## 2019-12-13 DIAGNOSIS — I25.10 ASHD (ARTERIOSCLEROTIC HEART DISEASE): ICD-10-CM

## 2019-12-13 DIAGNOSIS — I10 ESSENTIAL HYPERTENSION: ICD-10-CM

## 2019-12-13 DIAGNOSIS — R06.02 SOBOE (SHORTNESS OF BREATH ON EXERTION): ICD-10-CM

## 2019-12-13 RX ORDER — ISOSORBIDE MONONITRATE 30 MG/1
30 TABLET, EXTENDED RELEASE ORAL DAILY
Qty: 90 TAB | Refills: 1 | Status: SHIPPED | OUTPATIENT
Start: 2019-12-13 | End: 2020-06-04

## 2019-12-13 NOTE — PROGRESS NOTES
1. Have you been to the ER, urgent care clinic since your last visit? Hospitalized since your last visit? Seen on 10/28/19.    2. Have you seen or consulted any other health care providers outside of the [facility] since your last visit? Include any pap smears or colon screening. Seen by PCP.         Chief Complaint   Patient presents with    New Patient     referred by PCP-chest pin that radiates to back, soboe, , swelling in feet, ankles   [facility] Follow Up     from 10-28-19 for cardiac cath

## 2019-12-13 NOTE — PROGRESS NOTES
[address], [address] [facility]  [phone]     Subjective:      Melchor Liu is a 60 y.o. female is here to establish care. She has past medical hx of ASHD with recent cardiac catheterization  By Dr Ronald Albright 10/19---s/p PTCA stenting ISR of prox LAD. Patient states she never felt better after her most recent cath---still has myers, intermittent chest discomfort and tires easily. After her cardiac cath in 2017, she immediately felt better. She followed with Dr Ronald Albright 11/27/19 and felt that her symptoms were more related to GI and not cardiac. The patient denies  orthopnea, PND, LE edema, palpitations, syncope, or presyncope.        Patient Active Problem List    Diagnosis Date Noted    Unstable angina ([facility] [address] 04/26/2017      Manjeet Roa MD  Past Medical History:   Diagnosis Date    CAD (coronary artery disease)     Hypertension       Past Surgical History:   Procedure Laterality Date    BREAST SURGERY PROCEDURE UNLISTED      cystectomy x2    HX CORONARY STENT PLACEMENT  04/26/2017    HX GYN      vaginal wall repair    HX OTHER SURGICAL      surgery for fissure     Allergies   Allergen Reactions    Codeine Nausea and Vomiting      Family History   Problem Relation Age of Onset    Heart Disease Mother     Heart Disease Father       Social History     Socioeconomic History    Marital status:      Spouse name: Not on file    Number of children: Not on file    Years of education: Not on file    Highest education level: Not on file   Occupational History    Not on file   Social Needs    Financial resource strain: Not on file    Food insecurity:     Worry: Not on file     Inability: Not on file    Transportation needs:     Medical: Not on file     Non-medical: Not on file   Tobacco Use    Smoking status: Never Smoker    Smokeless tobacco: Never Used   Substance and Sexual Activity    Alcohol use: Yes     Frequency: 4 or more times a week Comment: 5-6 beers a week    Drug use: No    Sexual activity: Never   Lifestyle    Physical activity:     Days per week: Not on file     Minutes per session: Not on file    Stress: Not on file   Relationships    Social connections:     Talks on phone: Not on file     Gets together: Not on file     Attends Orthodoxy service: Not on file     Active member of club or organization: Not on file     Attends meetings of clubs or organizations: Not on file     Relationship status: Not on file    Intimate partner violence:     Fear of current or ex partner: Not on file     Emotionally abused: Not on file     Physically abused: Not on file     Forced sexual activity: Not on file   Other Topics Concern    Not on file   Social History Narrative    Not on file      Current Outpatient Medications   Medication Sig    isosorbide mononitrate ER (IMDUR) 30 mg tablet Take 1 Tab by mouth daily. To help prevent chest pain- call MD if significant headache    nitroglycerin (NITROSTAT) 0.4 mg SL tablet 1 Tab by SubLINGual route every five (5) minutes as needed for Chest Pain. Up to 3 doses.  aspirin 81 mg chewable tablet Take 1 Tab by mouth daily.  atorvastatin (LIPITOR) 40 mg tablet Take 1 Tab by mouth nightly.  metoprolol succinate (TOPROL-XL) 25 mg XL tablet Take 1 Tab by mouth daily.  ticagrelor (BRILINTA) 90 mg tablet Take 1 Tab by mouth every twelve (12) hours every twelve (12) hours.  amLODIPine (NORVASC) 5 mg tablet Take 5 mg by mouth daily. Pt taking 1 and 1/2 tab daily     No current facility-administered medications for this visit. Review of Symptoms:  11 systems reviewed, negative other than as stated in the HPI    Physical ExamPhysical Exam:    Vitals:    12/13/19 0948 12/13/19 1004 12/13/19 1031   BP: 148/86 158/90 144/84   Pulse: 82     Resp: 16     SpO2: 98%     Weight: 151 lb (68.5 kg)     Height: 5' 3\" (1.6 m)       Body mass index is 26.75 kg/m².   General PE  Gen:  NAD  Mental Status - Alert. General Appearance - Not in acute distress. HEENT:  PERRL, no carotid bruits or JVD  Chest and Lung Exam   Inspection: Accessory muscles - No use of accessory muscles in breathing. Auscultation:   Breath sounds: - Normal.   Cardiovascular   Inspection: Jugular vein - Bilateral - Inspection Normal.   Palpation/Percussion:   Apical Impulse: - Normal.   Auscultation: Rhythm - Regular. Heart Sounds - S1 WNL and S2 WNL. No S3 or S4. Murmurs & Other Heart Sounds: Auscultation of the heart reveals - No Murmurs. Peripheral Vascular   Upper Extremity: Inspection - Bilateral - No Cyanotic nailbeds or Digital clubbing. Lower Extremity:   Palpation: Edema - Bilateral - No edema. Abdomen:   Soft, non-tender, bowel sounds are active. Neuro: A&O times 3, CN and motor grossly WNL    Labs:   Lab Results   Component Value Date/Time    Cholesterol, total 208 (H) 04/27/2017 03:41 AM    HDL Cholesterol 51 04/27/2017 03:41 AM    LDL, calculated 125.6 (H) 04/27/2017 03:41 AM    Triglyceride 157 (H) 04/27/2017 03:41 AM    CHOL/HDL Ratio 4.1 04/27/2017 03:41 AM     Lab Results   Component Value Date/Time    CK 86 04/26/2017 08:30 AM     Lab Results   Component Value Date/Time    Sodium 142 10/29/2019 04:35 AM    Potassium 3.9 10/29/2019 04:35 AM    Chloride 109 (H) 10/29/2019 04:35 AM    CO2 23 10/29/2019 04:35 AM    Anion gap 10 10/29/2019 04:35 AM    Glucose 111 (H) 10/29/2019 04:35 AM    BUN 13 10/29/2019 04:35 AM    Creatinine 0.54 (L) 10/29/2019 04:35 AM    BUN/Creatinine ratio 24 (H) 10/29/2019 04:35 AM    GFR est AA >60 10/29/2019 04:35 AM    GFR est non-AA >60 10/29/2019 04:35 AM    Calcium 8.5 10/29/2019 04:35 AM    Bilirubin, total 0.3 05/07/2017 04:16 PM    AST (SGOT) 14 (L) 05/07/2017 04:16 PM    Alk.  phosphatase 108 05/07/2017 04:16 PM    Protein, total 7.7 05/07/2017 04:16 PM    Albumin 4.1 05/07/2017 04:16 PM    Globulin 3.6 05/07/2017 04:16 PM    A-G Ratio 1.1 05/07/2017 04:16 PM    ALT (SGPT) 29 05/07/2017 04:16 PM       EKG:  SR     Assessment:     Assessment:      1. Chest pain, unspecified type    2. Essential hypertension    3. SOBOE (shortness of breath on exertion)    4. ASHD (arteriosclerotic heart disease)    5. Mixed hyperlipidemia    6. Fatigue, unspecified type        Orders Placed This Encounter    REFERRAL TO CARDIAC REHAB     Referral Priority:   Routine     Referral Type:   Consultation     Referral Reason:   Specialty Services Required     Number of Visits Requested:   1    AMB POC EKG ROUTINE W/ 12 LEADS, INTER & REP     Order Specific Question:   Reason for Exam:     Answer:   routine    isosorbide mononitrate ER (IMDUR) 30 mg tablet     Sig: Take 1 Tab by mouth daily. To help prevent chest pain- call MD if significant headache     Dispense:  90 Tab     Refill:  1        Plan: This  is a 60 y.o. female is here to establish care. She has past medical hx of ASHD with recent cardiac catheterization  By Dr Trevor Madsen 10/19---s/p PTCA stenting ISR of prox LAD. Patient states she never felt better after her most recent cath---still has myers, intermittent chest discomfort and tires easily. After her cardiac cath in 2017, she immediately felt better. She followed up with Dr Trevor Madsen 11/27/19 and felt that her symptoms were more related to GI and not cardiac. ASHD  S/p PCI/DARRELL for ISR to prox LAD 11/19 performed by DR Rankin  Repeat stress Myoview, enroll in cardiac rehab. Continue ASA Brilinta 90 mg BID   Continue BB CCB statin  Add imdur 30 mg as third antianginal    HTN  Elevated---Imdur should help    HLD  On statin  Lipids and labs followed by PCP. F/u in 4 months if symptoms are stable and improved and stress test is negative.       Reymundo Pate MD

## 2019-12-13 NOTE — LETTER
12/19/19 Patient: Berton Ormond YOB: 1955 Date of Visit: 12/13/2019 Dave Messina MD 
[address]  
[id number] [address] [id number] VIA Facsimile: [phone] Dear Dave Messina MD, Thank you for referring Ms. Berton Ormond to [address] [place] [address] for evaluation. My notes for this consultation are attached. If you have questions, please do not hesitate to call me. I look forward to following your patient along with you.  
 
 
Sincerely, 
 
Tosin Rothman MD

## 2019-12-17 ENCOUNTER — TELEPHONE (OUTPATIENT)
Dept: CARDIOLOGY CLINIC | Age: 64
End: 2019-12-17

## 2019-12-17 NOTE — TELEPHONE ENCOUNTER
Called patient to confirm Nuclear stress test for 12/19. Message left confirming appointment time and reminder to hold all caffeine containing food, beverages and medicines for 24 hours.

## 2019-12-20 ENCOUNTER — TELEPHONE (OUTPATIENT)
Dept: CARDIOLOGY CLINIC | Age: 64
End: 2019-12-20

## 2019-12-20 NOTE — TELEPHONE ENCOUNTER
----- Message from Candelaria Stewart NP sent at 12/20/2019  1:50 PM EST -----  Renetta Starks,    Please call the patient and inform that stress test is normal.  Low concern for significant blockages in the heart arteries at this time.     Thanks,  [employer]

## 2020-01-30 ENCOUNTER — HOSPITAL ENCOUNTER (OUTPATIENT)
Dept: CARDIAC REHAB | Age: 65
Discharge: HOME | End: 2020-01-30
Payer: COMMERCIAL

## 2020-01-30 VITALS — HEIGHT: 63 IN | WEIGHT: 153 LBS | BODY MASS INDEX: 27.11 KG/M2

## 2020-01-30 PROCEDURE — 93798 PHYS/QHP OP CAR RHAB W/ECG: CPT

## 2020-01-30 NOTE — CARDIO/PULMONARY
Cardiopulmonary Rehab Orientation:     Met with  for the initial session. Mrs. Juanita Zavala is a 60year-old patient of Dr. Faith Major who presents to rehab for cardiac conditioning and strengthening, S/P stent 10/28/19 ; LVEF 77 %. History also includes HTN, CAD, previous cardiac stent. Allergy to codeine;     Immunization for influenza vaccine denied. Pt is nonsmoker. She drinks beer 5-6 times/week    Lungs were CTA; denied any cough. No LE edema was present. Exercise at home includes   Limitations: . Patient was given an educational notebook and viewed the cardiac rehab DVD. Psychosocial: Pt admits to moderate stress. BMI * , based on height of 5'3\" and weight 153 lbs. .       Patient's identified goals are:   1. Lose weight  2.  Establish/maintain exercise routine      Plan: Return for first exercise session on 2/3/2020

## 2020-02-03 ENCOUNTER — HOSPITAL ENCOUNTER (OUTPATIENT)
Dept: CARDIAC REHAB | Age: 65
Discharge: HOME | End: 2020-02-03
Payer: COMMERCIAL

## 2020-02-03 VITALS — WEIGHT: 152.4 LBS | BODY MASS INDEX: 27 KG/M2

## 2020-02-03 PROCEDURE — 93798 PHYS/QHP OP CAR RHAB W/ECG: CPT

## 2020-02-04 PROCEDURE — 93798 PHYS/QHP OP CAR RHAB W/ECG: CPT

## 2020-02-05 ENCOUNTER — HOSPITAL ENCOUNTER (OUTPATIENT)
Dept: CARDIAC REHAB | Age: 65
Discharge: HOME | End: 2020-02-05
Payer: COMMERCIAL

## 2020-02-05 VITALS — BODY MASS INDEX: 27 KG/M2 | WEIGHT: 152.4 LBS

## 2020-02-05 PROCEDURE — 93798 PHYS/QHP OP CAR RHAB W/ECG: CPT

## 2020-02-07 ENCOUNTER — HOSPITAL ENCOUNTER (OUTPATIENT)
Dept: CARDIAC REHAB | Age: 65
End: 2020-02-07
Payer: COMMERCIAL

## 2020-02-10 ENCOUNTER — HOSPITAL ENCOUNTER (OUTPATIENT)
Dept: CARDIAC REHAB | Age: 65
End: 2020-02-10
Payer: COMMERCIAL

## 2020-02-10 PROCEDURE — 93798 PHYS/QHP OP CAR RHAB W/ECG: CPT

## 2020-02-19 ENCOUNTER — HOSPITAL ENCOUNTER (OUTPATIENT)
Dept: CARDIAC REHAB | Age: 65
Discharge: HOME | End: 2020-02-19
Payer: COMMERCIAL

## 2020-02-19 VITALS — BODY MASS INDEX: 27.07 KG/M2 | WEIGHT: 152.8 LBS

## 2020-02-19 PROCEDURE — 93798 PHYS/QHP OP CAR RHAB W/ECG: CPT

## 2020-02-21 ENCOUNTER — HOSPITAL ENCOUNTER (OUTPATIENT)
Dept: CARDIAC REHAB | Age: 65
Discharge: HOME | End: 2020-02-21
Payer: COMMERCIAL

## 2020-02-21 VITALS — WEIGHT: 152 LBS | BODY MASS INDEX: 26.93 KG/M2

## 2020-02-21 PROCEDURE — 93798 PHYS/QHP OP CAR RHAB W/ECG: CPT

## 2020-02-24 ENCOUNTER — HOSPITAL ENCOUNTER (OUTPATIENT)
Dept: CARDIAC REHAB | Age: 65
Discharge: HOME | End: 2020-02-24
Payer: COMMERCIAL

## 2020-02-24 VITALS — WEIGHT: 152 LBS | BODY MASS INDEX: 26.93 KG/M2

## 2020-02-24 PROCEDURE — 93798 PHYS/QHP OP CAR RHAB W/ECG: CPT

## 2020-02-26 ENCOUNTER — HOSPITAL ENCOUNTER (OUTPATIENT)
Dept: CARDIAC REHAB | Age: 65
Discharge: HOME | End: 2020-02-26
Payer: COMMERCIAL

## 2020-02-26 VITALS — WEIGHT: 152.4 LBS | BODY MASS INDEX: 27 KG/M2

## 2020-02-26 PROCEDURE — 93798 PHYS/QHP OP CAR RHAB W/ECG: CPT

## 2020-02-28 ENCOUNTER — HOSPITAL ENCOUNTER (OUTPATIENT)
Dept: CARDIAC REHAB | Age: 65
Discharge: HOME | End: 2020-02-28
Payer: COMMERCIAL

## 2020-02-28 VITALS — BODY MASS INDEX: 26.85 KG/M2 | WEIGHT: 151.6 LBS

## 2020-02-28 PROCEDURE — 93797 PHYS/QHP OP CAR RHAB WO ECG: CPT | Performed by: DIETITIAN, REGISTERED

## 2020-02-28 PROCEDURE — 93798 PHYS/QHP OP CAR RHAB W/ECG: CPT

## 2020-02-28 NOTE — PROGRESS NOTES
Cardiac Rehab Nutrition Assessment - 1:1 Evaluation     NAME: Violeta Guillory : 1955 AGE: 60 y.o. GENDER: female  CARDIAC REHAB ADMITTING DIAGNOSIS: stents    Relevant Comorbidites: HTN, previous stent    LABS:   No results found for: HBA1C, HGBE8, FAV8DPRI, LKH0EQJB  Lab Results   Component Value Date/Time    Cholesterol, total 208 (H) 2017 03:41 AM    HDL Cholesterol 51 2017 03:41 AM    LDL, calculated 125.6 (H) 2017 03:41 AM    VLDL, calculated 31.4 2017 03:41 AM    Triglyceride 157 (H) 2017 03:41 AM    CHOL/HDL Ratio 4.1 2017 03:41 AM       MEDICATIONS/SUPPLEMENTS:   [unfilled]  Prior to Admission medications    Medication Sig Start Date End Date Taking? Authorizing Provider   isosorbide mononitrate ER (IMDUR) 30 mg tablet Take 1 Tab by mouth daily. To help prevent chest pain- call MD if significant headache 19   Damon Espinoza MD   nitroglycerin (NITROSTAT) 0.4 mg SL tablet 1 Tab by SubLINGual route every five (5) minutes as needed for Chest Pain. Up to 3 doses. 10/29/19   Jose Rankin MD   aspirin 81 mg chewable tablet Take 1 Tab by mouth daily. 17   Jose Rankin MD   atorvastatin (LIPITOR) 40 mg tablet Take 1 Tab by mouth nightly. 17   Jose Rankin MD   metoprolol succinate (TOPROL-XL) 25 mg XL tablet Take 1 Tab by mouth daily. 17   Jose Rankin MD   ticagrelor (BRILINTA) 90 mg tablet Take 1 Tab by mouth every twelve (12) hours every twelve (12) hours. 17   Jose Rankin MD   amLODIPine (NORVASC) 5 mg tablet Take 5 mg by mouth daily. Pt taking 1 and 1/2 tab daily    Provider, Historical     ANTHROPOMETRICS:    Ht Readings from Last 1 Encounters:   20 5' 3\" (1.6 m)      Wt Readings from Last 1 Encounters:   20 69.1 kg (152 lb 6.4 oz)      IBW: 115 # +/- 10%  %IBW: 132 % +/- 10%    BMI: 26.9 kg/M2 Category:  Overweight  Waist: 35.5 inches    Reported Wt Hx:    Reported Diet Hx:    Rate Your Plate Score: 57  (Score 58-72: Making many healthy choices; 41-57: Some choices need improving 24-40: many choices need improving)     24 Hour Diet Recall  Breakfast Smoothie- fruit, veggies, avocado, nuts, shanna seeds   Lunch salad   Dinner Pasta or grilled chicken, veggies   Snacks nuts   Beverages Water, beer       Environmental/Social:  Pt works FT for [employer]      NUTRITION INTERVENTION:  Nutrition 60 minute one-on-one education & goal setting with Juanita Zavala    Reviewed with Juanita Zavala relevant labs compared to ideals. Reviewed weight history and patient's verbalized weight goal as well as any real or perceived barriers to obtaining the goal. Collaborated with patient to set a specific short and long term weight goal.     Reviewed Rate Your Plate and conducted a verbal diet recall. Assessed for environmental, financial, psychosocial, physical and comorbidities that may impact the food and eating patterns / behaviors of Juanita Zavala    Collaborated with patient to set specific nutrient goals as well as specific food / behavior changes that will help patient meet the overall goal of following a heart healthy eating pattern (using guidelines as set forth by the American Heart Association and modeled after healthful eating patterns as recognized by the USDA Dietary Guidelines such as DASH, Mediterranean or plant-based). Briefly reviewed with Juanita Zavala the nutrition information in the Cardiac Rehab patient education book and encouraged Juanita Zavala to read thoroughly, ask questions as needed, and use for future reference for heart healthy nutrition information. Juanita Zavala is not scheduled to participate in Cardiac Rehab group nutrition classes.       PATIENT GOALS:    Weight Goals:  Short Term Weight Goal: 140 lbs  Long Term Weight Goal: 130 lbs    Nutrition Goals:  Daily Recommendations:  Calories: 4786-0074 /day  (using Lonie Divers with AF x1.2-1.3-250)    Saturated Fat: no more than 8-9 g/day  Trans Fat: 0 g/day  Sodium: no more than 1164-0550 mg/day  Fruit: 2 cups / day  Vegetables: 2-3 cups/day    Other:  1. Read food labels and limit saturated fat and sodium to above recommendations  2. Track food intake  3. Increase fish to 2x week    Keeping a food diary was recommended. Questions addressed. Follow-up plans discussed. Dung Crook verbalized understanding.             Gray Olivo RD

## 2020-03-02 ENCOUNTER — HOSPITAL ENCOUNTER (OUTPATIENT)
Dept: CARDIAC REHAB | Age: 65
Discharge: HOME | End: 2020-03-02
Payer: COMMERCIAL

## 2020-03-02 VITALS — BODY MASS INDEX: 26.89 KG/M2 | WEIGHT: 151.8 LBS

## 2020-03-02 PROCEDURE — 93798 PHYS/QHP OP CAR RHAB W/ECG: CPT

## 2020-03-04 ENCOUNTER — HOSPITAL ENCOUNTER (OUTPATIENT)
Dept: CARDIAC REHAB | Age: 65
Discharge: HOME | End: 2020-03-04
Payer: COMMERCIAL

## 2020-03-04 VITALS — BODY MASS INDEX: 26.68 KG/M2 | WEIGHT: 150.6 LBS

## 2020-03-04 PROCEDURE — 93798 PHYS/QHP OP CAR RHAB W/ECG: CPT

## 2020-03-09 ENCOUNTER — HOSPITAL ENCOUNTER (OUTPATIENT)
Dept: CARDIAC REHAB | Age: 65
Discharge: HOME | End: 2020-03-09
Payer: COMMERCIAL

## 2020-03-09 VITALS — BODY MASS INDEX: 26.75 KG/M2 | WEIGHT: 151 LBS

## 2020-03-09 PROCEDURE — 93798 PHYS/QHP OP CAR RHAB W/ECG: CPT

## 2020-03-11 ENCOUNTER — HOSPITAL ENCOUNTER (OUTPATIENT)
Dept: CARDIAC REHAB | Age: 65
Discharge: HOME | End: 2020-03-11
Payer: COMMERCIAL

## 2020-03-11 VITALS — WEIGHT: 157.6 LBS | BODY MASS INDEX: 27.92 KG/M2

## 2020-03-11 PROCEDURE — 93798 PHYS/QHP OP CAR RHAB W/ECG: CPT

## 2020-03-13 ENCOUNTER — HOSPITAL ENCOUNTER (OUTPATIENT)
Dept: CARDIAC REHAB | Age: 65
Discharge: HOME | End: 2020-03-13
Payer: COMMERCIAL

## 2020-03-13 VITALS — BODY MASS INDEX: 26.89 KG/M2 | WEIGHT: 151.8 LBS

## 2020-03-13 PROCEDURE — 93798 PHYS/QHP OP CAR RHAB W/ECG: CPT

## 2020-03-18 ENCOUNTER — APPOINTMENT (OUTPATIENT)
Dept: CARDIAC REHAB | Age: 65
End: 2020-03-18
Payer: COMMERCIAL

## 2020-03-20 ENCOUNTER — APPOINTMENT (OUTPATIENT)
Dept: CARDIAC REHAB | Age: 65
End: 2020-03-20
Payer: COMMERCIAL

## 2020-03-23 ENCOUNTER — APPOINTMENT (OUTPATIENT)
Dept: CARDIAC REHAB | Age: 65
End: 2020-03-23
Payer: COMMERCIAL

## 2020-03-25 ENCOUNTER — APPOINTMENT (OUTPATIENT)
Dept: CARDIAC REHAB | Age: 65
End: 2020-03-25
Payer: COMMERCIAL

## 2020-03-27 ENCOUNTER — APPOINTMENT (OUTPATIENT)
Dept: CARDIAC REHAB | Age: 65
End: 2020-03-27
Payer: COMMERCIAL

## 2020-03-30 ENCOUNTER — APPOINTMENT (OUTPATIENT)
Dept: CARDIAC REHAB | Age: 65
End: 2020-03-30
Payer: COMMERCIAL

## 2020-04-08 ENCOUNTER — TELEPHONE (OUTPATIENT)
Dept: CARDIAC REHAB | Age: 65
End: 2020-04-08

## 2020-04-08 NOTE — TELEPHONE ENCOUNTER
Spoke with Lc Caldera on 4/8/2020 for follow up Cardiopulmonary telephone counseling. Psychosocial: Patient reports having low stressors at this time. Discussed stress management techniques such as deep breathing or meditation. She states her stress is lower now that she is working out of her home. Smoking: Patient does not have current or recent  tobacco use. Exercise: Discussed patient's current home exercise routine. She reports that she is walking up to 5 miles daily. Suggested exercise options such as beginning some strength training. Reported barriers to exercise at this time include none. Plans to stay active during extended stay at home include continue walking and maybe add some simple weight training. Nutrition: Patient states her  is very health conscious and therefore she has been eating extremely well lately. Per patient she \"can not even cheat at lunch any more\". Reminded patient to keep up all her hard work.      Horacio Rivera RN

## 2020-06-08 ENCOUNTER — VIRTUAL VISIT (OUTPATIENT)
Dept: CARDIOLOGY CLINIC | Age: 65
End: 2020-06-08

## 2020-06-08 DIAGNOSIS — M79.89 SWELLING OF RIGHT LOWER EXTREMITY: ICD-10-CM

## 2020-06-08 DIAGNOSIS — E78.2 MIXED HYPERLIPIDEMIA: ICD-10-CM

## 2020-06-08 DIAGNOSIS — I25.10 ASHD (ARTERIOSCLEROTIC HEART DISEASE): Primary | ICD-10-CM

## 2020-06-08 DIAGNOSIS — I10 ESSENTIAL HYPERTENSION: ICD-10-CM

## 2020-06-08 NOTE — PROGRESS NOTES
FOLLOW UP. C/O CHEST PAIN, TOOK NITRO LAST WEEK, PAIN STOPPED, SWELLING IN BLE,SWELLING IN RIGHT CALF WITH PAIN.

## 2020-06-08 NOTE — PROGRESS NOTES
Yuri Kuhn is a 60 y.o. female who was seen by synchronous (real-time) audio-video technology on 6/8/2020     [address] [facility], [address]  [phone]     Subjective:      Yuri Kuhn is a 60 y.o. female is here for routine f/u. The patient denies chest pain/ shortness of breath, orthopnea, PND, LE edema, palpitations, syncope, or presyncope. Pain and swelling of right calf. DVT study negative times 2.  2 episodes of random CP at work, resolved with NTG times 1. Otherwise, completed 15 out of 36 sessions of cardiac rehab and was feeling great with that, walking more recently and feeling great with that.     Patient Active Problem List    Diagnosis Date Noted    Unstable angina ([facility] [address] 04/26/2017      Shayy Branham MD  Past Medical History:   Diagnosis Date    CAD (coronary artery disease)     Hypertension       Past Surgical History:   Procedure Laterality Date    BREAST SURGERY PROCEDURE UNLISTED      cystectomy x2    HX CORONARY STENT PLACEMENT  04/26/2017    HX GYN      vaginal wall repair    HX OTHER SURGICAL      surgery for fissure     Allergies   Allergen Reactions    Codeine Nausea and Vomiting      Family History   Problem Relation Age of Onset    Heart Disease Mother     Heart Disease Father       Social History     Socioeconomic History    Marital status:      Spouse name: Not on file    Number of children: Not on file    Years of education: Not on file    Highest education level: Not on file   Occupational History    Not on file   Social Needs    Financial resource strain: Not on file    Food insecurity     Worry: Not on file     Inability: Not on file    Transportation needs     Medical: Not on file     Non-medical: Not on file   Tobacco Use    Smoking status: Never Smoker    Smokeless tobacco: Never Used   Substance and Sexual Activity    Alcohol use: Yes     Frequency: 4 or more times a week     Comment: 5-6 beers a week    Drug use: No    Sexual activity: Never   Lifestyle    Physical activity     Days per week: Not on file     Minutes per session: Not on file    Stress: Not on file   Relationships    Social connections     Talks on phone: Not on file     Gets together: Not on file     Attends Zoroastrian service: Not on file     Active member of club or organization: Not on file     Attends meetings of clubs or organizations: Not on file     Relationship status: Not on file    Intimate partner violence     Fear of current or ex partner: Not on file     Emotionally abused: Not on file     Physically abused: Not on file     Forced sexual activity: Not on file   Other Topics Concern    Not on file   Social History Narrative    Not on file      Current Outpatient Medications   Medication Sig    metoprolol blackman-hydrochlorothiaz 50-12.5 mg Tb24 Take 50 mg by mouth daily.  isosorbide mononitrate ER (IMDUR) 30 mg tablet TAKE 1 TABLET BY MOUTH EVERY DAY TO PREVENT CHEST PAIN, CALL MD IF SIGNIFICANT HEADACHE    nitroglycerin (NITROSTAT) 0.4 mg SL tablet 1 Tab by SubLINGual route every five (5) minutes as needed for Chest Pain. Up to 3 doses.  aspirin 81 mg chewable tablet Take 1 Tab by mouth daily.  atorvastatin (LIPITOR) 40 mg tablet Take 1 Tab by mouth nightly.  ticagrelor (BRILINTA) 90 mg tablet Take 1 Tab by mouth every twelve (12) hours every twelve (12) hours.  amLODIPine (NORVASC) 5 mg tablet Take 5 mg by mouth daily. Pt taking 1 and 1/2 tab daily    metoprolol succinate (TOPROL-XL) 25 mg XL tablet Take 1 Tab by mouth daily. (Patient taking differently: Take 50 mg by mouth daily.)     No current facility-administered medications for this visit. Review of Symptoms:  11 systems reviewed, negative other than as stated in the HPI    Physical Exam:    There were no vitals filed for this visit. See patient reported vital signs in nursing note as applicable.   There is no height or weight on file to calculate BMI.  General PE  Gen:  NAD  Mental Status - Alert. General Appearance - Not in acute distress. HEENT:  PER, EOM, no JVD  Chest and Lung Exam   Inspection: Accessory muscles - No use of accessory muscles in breathing. No audible wheezing. Normal effort in breathing. Symmetric excursion. Cardiovascular:  No JVD  Upper Extremity: Inspection - Bilateral - No Cyanotic nailbeds or Digital clubbing. Lower Extremity:   Palpation: Edema - Bilateral - No edema. Neuro: A&O times 3, CN and motor grossly WNL  Skin: no rashes or lesions on exposed skin, dry, intact  Psych: normal mood, affect. Speech clear. Labs:   Lab Results   Component Value Date/Time    Cholesterol, total 208 (H) 04/27/2017 03:41 AM    HDL Cholesterol 51 04/27/2017 03:41 AM    LDL, calculated 125.6 (H) 04/27/2017 03:41 AM    Triglyceride 157 (H) 04/27/2017 03:41 AM    CHOL/HDL Ratio 4.1 04/27/2017 03:41 AM     Lab Results   Component Value Date/Time    CK 86 04/26/2017 08:30 AM     Lab Results   Component Value Date/Time    Sodium 142 10/29/2019 04:35 AM    Potassium 3.9 10/29/2019 04:35 AM    Chloride 109 (H) 10/29/2019 04:35 AM    CO2 23 10/29/2019 04:35 AM    Anion gap 10 10/29/2019 04:35 AM    Glucose 111 (H) 10/29/2019 04:35 AM    BUN 13 10/29/2019 04:35 AM    Creatinine 0.54 (L) 10/29/2019 04:35 AM    BUN/Creatinine ratio 24 (H) 10/29/2019 04:35 AM    GFR est AA >60 10/29/2019 04:35 AM    GFR est non-AA >60 10/29/2019 04:35 AM    Calcium 8.5 10/29/2019 04:35 AM    Bilirubin, total 0.3 05/07/2017 04:16 PM    Alk. phosphatase 108 05/07/2017 04:16 PM    Protein, total 7.7 05/07/2017 04:16 PM    Albumin 4.1 05/07/2017 04:16 PM    Globulin 3.6 05/07/2017 04:16 PM    A-G Ratio 1.1 05/07/2017 04:16 PM    ALT (SGPT) 29 05/07/2017 04:16 PM          Assessment:      1. ASHD (arteriosclerotic heart disease)    2. Essential hypertension    3.  Mixed hyperlipidemia        Orders Placed This Encounter    CK    LIPID PANEL    METABOLIC PANEL, COMPREHENSIVE    metoprolol blackman-hydrochlorothiaz 50-12.5 mg Tb24     Sig: Take 50 mg by mouth daily. Plan:     Patient presents for a telemedicine visit. Last seen in clinic 12/19 as a new patient---s/p cardiac catheterization by Dr Romeo Carpenter 10/19---s/p PTCA stenting ISR of prox LAD. She still c/o myers and intermittent chest discomfort, easily fatigued. We obtained NST and enrolled her in cardiac rehab. We also added  Imdur to her pharmacotherapy      ASHD  S/p PCI/DARRELL for ISR to prox LAD 10/19 performed by DR Romeo Carpenter et al  Negative NST in 12/19  Continue ASA Brilinta 90 mg BID x 1 yr stent placement  Continue BB CCB Imdur statin  Completed cardiac rehab 15/36 sessionsshe will call to re-enroll in cardiac rehab opens again after COVID-19 pandemic numbers improve     HTN  Controlled with current therapy     HLD  On statin  Lipids and labs followed by PCP. Order labs now. Advised that when she is 2 weeks from running out of her cardiac medications, call her pharmacy for a 90-day supply with 4 refills    Right lower extremity swelling:   Has had DVT ultrasound negative x2 in the past for this. Will check venous reflux study, encourage use of compression stockings. If reflux study is abnormal, refer to Dr. Cady Love. Continue current care and f/u in 1/2021. Courtney Coy MD    This virtual visit was conducted with audiovisual connection using [employer] telemedicine services. Pursuant to the emergency declaration under the [facility], [facility] waiver authority and the [employer] Act, this Virtual Visit was conducted, with patient's consent, to reduce the patient's risk of exposure to COVID-19 and provide continuity of care for an established patient. Services were provided through a video synchronous discussion virtually to substitute for in-person clinic visit.

## 2020-06-30 LAB
LEFT GSV AT KNEE DIAM: 0.48 CM
LEFT GSV AT KNEE RFX: 2872 S
LEFT GSV BK MID DIAM: 0.33 CM
LEFT GSV BK MID RFX: 2916 S
LEFT GSV JUNC DIAM: 0.7 CM
LEFT GSV JUNC RFX: 598 S
LEFT GSV THIGH PROX DIAM: 0.77 CM
LEFT GSV THIGH PROX RFX: 0 S
LEFT SSV PROX DIAM: 0.6 CM
LEFT SSV PROX RFX: 0 S
RIGHT GSV AK RFX: 2251 S
RIGHT GSV AT KNEE DIAM: 0.4 CM
RIGHT GSV BK MID DIAM: 0.47 CM
RIGHT GSV BK MID RFX: 2983 S
RIGHT GSV JUNC DIAM: 0.73 CM
RIGHT GSV JUNC RFX: 987 S
RIGHT GSV THIGH PROX DIAM: 0.7 CM
RIGHT GSV THIGH PROX RFX: 0 S
RIGHT SSV PROX DIAM: 0.48 CM
RIGHT SSV PROX RFX: 0 S

## 2020-07-01 ENCOUNTER — TELEPHONE (OUTPATIENT)
Dept: CARDIOLOGY CLINIC | Age: 65
End: 2020-07-01

## 2020-07-01 NOTE — PROGRESS NOTES
Please call the patient and inform that venous duplex shows leaky veins in both legs.   Will need to see Dr. Elmira Hutson in [address] [place] [address] to further discuss

## 2020-07-01 NOTE — TELEPHONE ENCOUNTER
----- Message from Fatmata Lua NP sent at 7/1/2020  2:50 PM EDT -----  Please call the patient and inform that venous duplex shows leaky veins in both legs. Will need to see Dr. Shawn Ruiz in [address] [place] [address] to further discuss    Called pt,verified pt with two pt identifiers, advised pt her le venous doppler showed leaky veins in both legs and she will need to f/u in office to discuss further. Advised our  will call to get her scheduled. Pt verbalized understanding.

## 2020-07-02 NOTE — TELEPHONE ENCOUNTER
Message   Received: Today   Message Contents   Lynsey Tabor LPN   Caller: Unspecified Drekatarzynabessie Yoandy,  2:56 PM)             Scheduled for 7/21          Noted, thanks.

## 2020-07-21 ENCOUNTER — OFFICE VISIT (OUTPATIENT)
Dept: CARDIOLOGY CLINIC | Age: 65
End: 2020-07-21

## 2020-07-21 VITALS
RESPIRATION RATE: 16 BRPM | HEART RATE: 76 BPM | HEIGHT: 63 IN | SYSTOLIC BLOOD PRESSURE: 100 MMHG | WEIGHT: 146.5 LBS | DIASTOLIC BLOOD PRESSURE: 60 MMHG | BODY MASS INDEX: 25.96 KG/M2 | OXYGEN SATURATION: 97 %

## 2020-07-21 DIAGNOSIS — I83.893 VARICOSE VEINS OF BOTH LEGS WITH EDEMA: ICD-10-CM

## 2020-07-21 DIAGNOSIS — I25.10 CORONARY ARTERY DISEASE INVOLVING NATIVE CORONARY ARTERY OF NATIVE HEART WITHOUT ANGINA PECTORIS: ICD-10-CM

## 2020-07-21 DIAGNOSIS — I87.2 VENOUS INSUFFICIENCY OF BOTH LOWER EXTREMITIES: Primary | ICD-10-CM

## 2020-07-21 RX ORDER — METOPROLOL SUCCINATE 50 MG/1
TABLET, EXTENDED RELEASE ORAL
COMMUNITY
Start: 2020-06-04 | End: 2022-05-09 | Stop reason: SDUPTHER

## 2020-07-21 NOTE — PROGRESS NOTES
Chief Complaint   Patient presents with    Referral / Esme Rico Results     abnormal venous doppler     Patient here for review of recent venous doppler has C/O of right leg discomfort at times also happening at night.

## 2020-07-21 NOTE — PROGRESS NOTES
Rufino Gilford, FNP-BC    7/21/2020 2:10 PM      Subjective:     Ms. Latisha Marks is a 60 y.o. female who is here for c/o leg swelling and discomfort. She has a PMHx of CAD, HTN and Hyperlipidemia. She was seen in June by Dr Ella Bai and discussed these symptoms. A venous duplex study was ordered to evaluate for venous insufficiency and was positive in james legs. Past Medical History:   Diagnosis Date    CAD (coronary artery disease)     Hypertension         Past Surgical History:   Procedure Laterality Date    BREAST SURGERY PROCEDURE UNLISTED      cystectomy x2    HX CORONARY STENT PLACEMENT  04/26/2017    HX GYN      vaginal wall repair    HX OTHER SURGICAL      surgery for fissure       Family History   Problem Relation Age of Onset    Heart Disease Mother     Heart Disease Father         Social History     Tobacco Use    Smoking status: Never Smoker    Smokeless tobacco: Never Used   Substance Use Topics    Alcohol use: Yes     Frequency: 4 or more times a week     Comment: 5-6 beers a week       Visit Vitals  /60 (BP 1 Location: Right arm, BP Patient Position: Standing)   Pulse 76   Resp 16   Ht 5' 3\" (1.6 m)   Wt 146 lb 8 oz (66.5 kg)   SpO2 97%   BMI 25.95 kg/m²       Current Outpatient Medications   Medication Sig    metoprolol succinate (TOPROL-XL) 50 mg XL tablet TAKE 1 TABLET BY MOUTH EVERY DAY    isosorbide mononitrate ER (IMDUR) 30 mg tablet TAKE 1 TABLET BY MOUTH EVERY DAY TO PREVENT CHEST PAIN, CALL MD IF SIGNIFICANT HEADACHE    nitroglycerin (NITROSTAT) 0.4 mg SL tablet 1 Tab by SubLINGual route every five (5) minutes as needed for Chest Pain. Up to 3 doses.  aspirin 81 mg chewable tablet Take 1 Tab by mouth daily.  atorvastatin (LIPITOR) 40 mg tablet Take 1 Tab by mouth nightly.  ticagrelor (BRILINTA) 90 mg tablet Take 1 Tab by mouth every twelve (12) hours every twelve (12) hours.  amLODIPine (NORVASC) 5 mg tablet Take 5 mg by mouth daily.  Pt taking 1 and 1/2 tab daily     No current facility-administered medications for this visit. Allergies   Allergen Reactions    Codeine Nausea and Vomiting       Objective:        Data Review:   Lab Results   Component Value Date/Time    Cholesterol, total 208 (H) 04/27/2017 03:41 AM    HDL Cholesterol 51 04/27/2017 03:41 AM    LDL, calculated 125.6 (H) 04/27/2017 03:41 AM    VLDL, calculated 31.4 04/27/2017 03:41 AM    CHOL/HDL Ratio 4.1 04/27/2017 03:41 AM       No results found for: HBA1C, VHC2DUIY, HNO1IQCC, YJY2XYXN    Lab Results   Component Value Date/Time    Sodium 142 10/29/2019 04:35 AM    Potassium 3.9 10/29/2019 04:35 AM    Chloride 109 (H) 10/29/2019 04:35 AM    CO2 23 10/29/2019 04:35 AM    Glucose 111 (H) 10/29/2019 04:35 AM    BUN 13 10/29/2019 04:35 AM    Creatinine 0.54 (L) 10/29/2019 04:35 AM    BUN/Creatinine ratio 24 (H) 10/29/2019 04:35 AM    GFR est AA >60 10/29/2019 04:35 AM    GFR est non-AA >60 10/29/2019 04:35 AM    Calcium 8.5 10/29/2019 04:35 AM    Anion gap 10 10/29/2019 04:35 AM    Bilirubin, total 0.3 05/07/2017 04:16 PM    ALT (SGPT) 29 05/07/2017 04:16 PM    Alk. phosphatase 108 05/07/2017 04:16 PM    Protein, total 7.7 05/07/2017 04:16 PM    Albumin 4.1 05/07/2017 04:16 PM    Globulin 3.6 05/07/2017 04:16 PM    A-G Ratio 1.1 05/07/2017 04:16 PM       1. Have you ever had vein stripping surgery NO       2. Have you ever had vein injections? NO        3. Have you ever had a blood clot? NO       4. Have you ever had phlebitis? NO                                                                         Does anyone in your family have (or used to have) varicose veins, spider veins, leg ulcers or swollen legs? Father  NO  Mother YES  Brother(s) NO  Sister(s) NO  Other NO           1. Do you experience any of the following in your legs? Aching/pain? YES  [] One leg [x] Both legs  Heaviness? YES  [] One leg [x] Both legs  Tiredness/fatigue?     YES  [] One leg [x] Both legs  Itching/burning? NO  [] One leg [] Both legs  Swollen ankles? YES  [] One leg [x] Both legs  Leg cramps? YES  [] One leg [x] Both legs  Restless legs? NO  [] One leg [] Both legs  Throbbing? YES  [] One leg [x] Both legs  Other? 2.  Have your veins gotten worse in recent months? YES    Describe: she has become more symptomatic with her leg pain    3. Do you take any medication for pain (i.e., Advil, Motrin)  NO            4. Do you elevate your legs to relieve discomfort? YES    If yes, how long per day do you elevate and does it provide relief? mildly    5. Do you exercise? YES    If yes, what kind of exercise and how often? Walking daily    6. Do you wear prescription compression stockings? NO            7. Do you wear light support hose (i.e., [employer])? NO                      8.    Do you have any problem walking? YES              If yes, describe how it interferes with your activities of daily living, which Activities? she has had times                                  where she was unable to walk due to discomfort                   9.   What type of work do you do? Works at [employer] long do you stand (hours per day) at work? She has a standing desk- 2hrs  At home? 1-2hrs         10. Have you ever had any test(s) done on your veins? YES          11. Were you diagnosed with saphenous vein reflux? YES    Review of Symptoms:    ROS    Physical Exam:      General: Well developed, in no acute distress, cooperative and alert  HEENT: No carotid bruits, no JVD, trach is midline. Neck Supple, PEERL, EOM intact. Heart:  reg rate and rhythm; normal S1/S2; no murmurs, no gallops or rubs. Respiratory: Clear bilaterally x 4, no wheezing or rales  Abdomen:   Soft, non-tender, no distention, no masses. + BS. Extremities:  Normal cap refill, no cyanosis, atraumatic. [facility] Neuro: A&Ox3, speech clear, gait stable.    Skin: Skin color is normal. No rashes or lesions. Non diaphoretic  Vascular: 2+ pulses symmetric in all extremities  B/l LE spider veins, skin pigmentation and edema. Assessment:       ICD-10-CM ICD-9-CM    1. Venous insufficiency of both lower extremities  I87.2 459.81 AMB POC EKG ROUTINE W/ 12 LEADS, INTER & REP   2. Varicose veins of both legs with edema  I83.893 454.8    3. Coronary artery disease involving native coronary artery of native heart without angina pectoris  I25.10 414.01        Plan:     1. Varicose veins of LE , Venous insufficiency of both lower extremities   Venous duplex shows james great saphenous vein insufficiency which is symptomatic. Prescription given for 20-30mmHg compression stockings that are thigh high. 1.  - Instruction given on medication dosage  2.  - Instruction given on daily leg elevation   3.  - Instruction given for mild exercise  4.  - Instruction given for weight reduction    F/U in 3 months. Patient seen and examined by me with nurse practitioner in vascular clinic. I personally performed all components of the history, physical, and medical decision making and agree with the assessment and plan as noted. CEAP class 4. Compression stockings thigh. Photo taken. - CAD: f/u with Dr. Lazaro Escobar. Pt complaining of feeling lightheaded. Normal BP. Normal EKG in office. Normal stress test in 12/2019. Will have her f/u with Dr. Lazaro Escobar.   - BP controlled.      Teri Walters MD  7/21/2020

## 2020-07-29 ENCOUNTER — TELEPHONE (OUTPATIENT)
Dept: CARDIAC REHAB | Age: 65
End: 2020-07-29

## 2020-07-29 NOTE — TELEPHONE ENCOUNTER
Spoke with patient, does wish to return to the Cardiac Rehab program following 8/13 appt. With Dr. Caesar Garcia.  Will call us back

## 2020-09-30 ENCOUNTER — OFFICE VISIT (OUTPATIENT)
Dept: CARDIOLOGY CLINIC | Age: 65
End: 2020-09-30
Payer: COMMERCIAL

## 2020-09-30 VITALS
WEIGHT: 148.1 LBS | HEIGHT: 63 IN | SYSTOLIC BLOOD PRESSURE: 140 MMHG | RESPIRATION RATE: 15 BRPM | DIASTOLIC BLOOD PRESSURE: 80 MMHG | OXYGEN SATURATION: 98 % | BODY MASS INDEX: 26.24 KG/M2 | HEART RATE: 66 BPM

## 2020-09-30 DIAGNOSIS — I87.2 VENOUS INSUFFICIENCY OF BOTH LOWER EXTREMITIES: ICD-10-CM

## 2020-09-30 DIAGNOSIS — I83.893 VARICOSE VEINS OF BOTH LEGS WITH EDEMA: ICD-10-CM

## 2020-09-30 DIAGNOSIS — I25.10 ASHD (ARTERIOSCLEROTIC HEART DISEASE): Primary | ICD-10-CM

## 2020-09-30 DIAGNOSIS — R07.9 CHEST PAIN, UNSPECIFIED TYPE: ICD-10-CM

## 2020-09-30 DIAGNOSIS — R06.02 SOBOE (SHORTNESS OF BREATH ON EXERTION): ICD-10-CM

## 2020-09-30 DIAGNOSIS — I10 ESSENTIAL HYPERTENSION: ICD-10-CM

## 2020-09-30 DIAGNOSIS — E78.2 MIXED HYPERLIPIDEMIA: ICD-10-CM

## 2020-09-30 DIAGNOSIS — I25.10 ASHD (ARTERIOSCLEROTIC HEART DISEASE): ICD-10-CM

## 2020-09-30 PROCEDURE — 99214 OFFICE O/P EST MOD 30 MIN: CPT | Performed by: INTERNAL MEDICINE

## 2020-09-30 PROCEDURE — 93000 ELECTROCARDIOGRAM COMPLETE: CPT | Performed by: INTERNAL MEDICINE

## 2020-09-30 NOTE — PROGRESS NOTES
[address] [place] [address]  [phone]     Subjective:      Tiffanie Galvez is a 63 y.o. female is here for routine f/u. She has pmhx ASHD HTN HLD and venous insufficiency. Last seen by us via [facility]V in 6/2020. Saw the Dr Montana Ugarte in 7/2020. She c/o lightheadedness then---Normal BP. Normal EKG in office. Normal stress test in 12/2019. Recs to f/u with us. Today, she denies any further dizziness. C/o  4 episodes of non exertional chest discomfort since July. Last episode was Thursday last week, she was sitting at her work desk. Symptom relieved with 2 NTG. States same feeling she had when she ended up with a stent last year. Walks daily 2.5 miles, she has myers but this has been unchanged from previous    The patient denies orthopnea, PND, LE edema, palpitations, syncope, or presyncope.        Patient Active Problem List    Diagnosis Date Noted    Venous insufficiency of both lower extremities 07/21/2020    Varicose veins of both legs with edema 07/21/2020    Coronary artery disease involving native coronary artery of native heart without angina pectoris 07/21/2020    Unstable angina ([facility] [address] 04/26/2017      Fara Nash MD  Past Medical History:   Diagnosis Date    CAD (coronary artery disease)     Hypertension       Past Surgical History:   Procedure Laterality Date    BREAST SURGERY PROCEDURE UNLISTED      cystectomy x2    HX CORONARY STENT PLACEMENT  04/26/2017    HX GYN      vaginal wall repair    HX OTHER SURGICAL      surgery for fissure     Allergies   Allergen Reactions    Codeine Nausea and Vomiting      Family History   Problem Relation Age of Onset    Heart Disease Mother     Heart Disease Father       Social History     Socioeconomic History    Marital status:      Spouse name: Not on file    Number of children: Not on file    Years of education: Not on file    Highest education level: Not on file   Occupational History    Not on file Social Needs    Financial resource strain: Not on file    Food insecurity     Worry: Not on file     Inability: Not on file    Transportation needs     Medical: Not on file     Non-medical: Not on file   Tobacco Use    Smoking status: Never Smoker    Smokeless tobacco: Never Used   Substance and Sexual Activity    Alcohol use: Yes     Frequency: 4 or more times a week     Comment: 5-6 beers a week    Drug use: No    Sexual activity: Never   Lifestyle    Physical activity     Days per week: Not on file     Minutes per session: Not on file    Stress: Not on file   Relationships    Social connections     Talks on phone: Not on file     Gets together: Not on file     Attends Rastafari service: Not on file     Active member of club or organization: Not on file     Attends meetings of clubs or organizations: Not on file     Relationship status: Not on file    Intimate partner violence     Fear of current or ex partner: Not on file     Emotionally abused: Not on file     Physically abused: Not on file     Forced sexual activity: Not on file   Other Topics Concern    Not on file   Social History Narrative    Not on file      Current Outpatient Medications   Medication Sig    metoprolol succinate (TOPROL-XL) 50 mg XL tablet TAKE 1 TABLET BY MOUTH EVERY DAY    isosorbide mononitrate ER (IMDUR) 30 mg tablet TAKE 1 TABLET BY MOUTH EVERY DAY TO PREVENT CHEST PAIN, CALL MD IF SIGNIFICANT HEADACHE    nitroglycerin (NITROSTAT) 0.4 mg SL tablet 1 Tab by SubLINGual route every five (5) minutes as needed for Chest Pain. Up to 3 doses.  aspirin 81 mg chewable tablet Take 1 Tab by mouth daily.  atorvastatin (LIPITOR) 40 mg tablet Take 1 Tab by mouth nightly.  ticagrelor (BRILINTA) 90 mg tablet Take 1 Tab by mouth every twelve (12) hours every twelve (12) hours.  amLODIPine (NORVASC) 5 mg tablet Take 5 mg by mouth daily.  Pt taking 1 and 1/2 tab daily     No current facility-administered medications for this visit. Review of Symptoms:  11 systems reviewed, negative other than as stated in the HPI    Physical ExamPhysical Exam:    Vitals:    09/30/20 1254 09/30/20 1303   BP: (!) 145/80 (!) 140/80   Pulse: 66    Resp: 15    SpO2: 98%    Weight: 148 lb 1.6 oz (67.2 kg)    Height: 5' 3\" (1.6 m)      Body mass index is 26.23 kg/m². General PE  Gen:  NAD  Mental Status - Alert. General Appearance - Not in acute distress. HEENT:  PERRL, no carotid bruits or JVD  Chest and Lung Exam   Inspection: Accessory muscles - No use of accessory muscles in breathing. Auscultation:   Breath sounds: - Normal.   Cardiovascular   Inspection: Jugular vein - Bilateral - Inspection Normal.   Palpation/Percussion:   Apical Impulse: - Normal.   Auscultation: Rhythm - Regular. Heart Sounds - S1 WNL and S2 WNL. No S3 or S4. Murmurs & Other Heart Sounds: Auscultation of the heart reveals - No Murmurs. Peripheral Vascular   Upper Extremity: Inspection - Bilateral - No Cyanotic nailbeds or Digital clubbing. Lower Extremity:   Palpation: Edema - Bilateral - No edema. Abdomen:   Soft, non-tender, bowel sounds are active.   Neuro: A&O times 3, CN and motor grossly WNL    Labs:   Lab Results   Component Value Date/Time    Cholesterol, total 208 (H) 04/27/2017 03:41 AM    HDL Cholesterol 51 04/27/2017 03:41 AM    LDL, calculated 125.6 (H) 04/27/2017 03:41 AM    Triglyceride 157 (H) 04/27/2017 03:41 AM    CHOL/HDL Ratio 4.1 04/27/2017 03:41 AM     Lab Results   Component Value Date/Time    CK 86 04/26/2017 08:30 AM     Lab Results   Component Value Date/Time    Sodium 142 10/29/2019 04:35 AM    Potassium 3.9 10/29/2019 04:35 AM    Chloride 109 (H) 10/29/2019 04:35 AM    CO2 23 10/29/2019 04:35 AM    Anion gap 10 10/29/2019 04:35 AM    Glucose 111 (H) 10/29/2019 04:35 AM    BUN 13 10/29/2019 04:35 AM    Creatinine 0.54 (L) 10/29/2019 04:35 AM    BUN/Creatinine ratio 24 (H) 10/29/2019 04:35 AM    GFR est AA >60 10/29/2019 04:35 AM    GFR est non-AA >60 10/29/2019 04:35 AM    Calcium 8.5 10/29/2019 04:35 AM    Bilirubin, total 0.3 05/07/2017 04:16 PM    Alk. phosphatase 108 05/07/2017 04:16 PM    Protein, total 7.7 05/07/2017 04:16 PM    Albumin 4.1 05/07/2017 04:16 PM    Globulin 3.6 05/07/2017 04:16 PM    A-G Ratio 1.1 05/07/2017 04:16 PM    ALT (SGPT) 29 05/07/2017 04:16 PM       EKG:  NSR      Assessment:     Assessment:      1. ASHD (arteriosclerotic heart disease)    2. Venous insufficiency of both lower extremities    3. Essential hypertension    4. Mixed hyperlipidemia    5. Varicose veins of both legs with edema    6. SOBOE (shortness of breath on exertion)    7. Chest pain, unspecified type        Orders Placed This Encounter    AMB POC EKG ROUTINE W/ 12 LEADS, INTER & REP     Order Specific Question:   Reason for Exam:     Answer:   routine        Plan:     ASHD  S/p PCI/DARRELL for ISR to prox LAD 10/19 performed by DR Chela Corona et al  Negative NST in 12/19  Continue ASA Brilinta 90 mg BID x 1 yr stent placement  Continue BB CCB Imdur statin  Completed cardiac rehab 15/36 sessionsshe wants to re-enroll if able  Recommend to increase Imdur to 60 mg daily---she will try and call us if shes able to tolerate it or not-not changed on computer, will prescribe higher dose if she tolerates it. Repeat stress test cardiolyte     HTN  Controlled with current therapy        HLD  On statin  Lipids and labs followed by PCP.     Labs ordered last OV---remains pending      Venous insufficiency  Followed by Dr Cari Trinh, seen 7/2020    Hx dizziness, resolved  She will call us for recurrent symptom and will likely order event monitor then    Follow up in 6 months if testing normal and no progressive symptoms after gradual increase exercise and improved diet.         Lauren Hoff MD

## 2020-09-30 NOTE — PROGRESS NOTES
1. Have you been to the ER, urgent care clinic since your last visit? Hospitalized since your last visit? No.      2. Have you seen or consulted any other health care providers outside of the [address] [place] [address] since your last visit? Include any pap smears or colon screening.    No.      Chief Complaint   Patient presents with    Follow-up     3 month- more chest pain, sob- pt has not felt faint since her last visit, no dizziness

## 2020-09-30 NOTE — LETTER
9/30/20 Patient: Julia Lei YOB: 1955 Date of Visit: 9/30/2020 Burgess Ramesh MD 
[address] [facility] 
[address] [address] VIA Facsimile: [phone] Dear Burgess Ramesh MD, Thank you for referring Ms. Julia Lei to [address] [place] [address] for evaluation. My notes for this consultation are attached. If you have questions, please do not hesitate to call me. I look forward to following your patient along with you.  
 
 
Sincerely, 
 
Cintia Marsh MD

## 2020-10-22 ENCOUNTER — OFFICE VISIT (OUTPATIENT)
Dept: URGENT CARE | Age: 65
End: 2020-10-22
Payer: COMMERCIAL

## 2020-10-22 VITALS — HEART RATE: 84 BPM | TEMPERATURE: 98.3 F | OXYGEN SATURATION: 98 % | RESPIRATION RATE: 14 BRPM

## 2020-10-22 DIAGNOSIS — Z01.818 PRE-OP EXAM: Primary | ICD-10-CM

## 2020-10-22 PROCEDURE — 99211 OFF/OP EST MAY X REQ PHY/QHP: CPT | Performed by: FAMILY MEDICINE

## 2020-10-24 LAB — SARS-COV-2, NAA: NOT DETECTED

## 2020-10-28 ENCOUNTER — ANCILLARY PROCEDURE (OUTPATIENT)
Dept: CARDIOLOGY CLINIC | Age: 65
End: 2020-10-28
Payer: COMMERCIAL

## 2020-10-28 VITALS
BODY MASS INDEX: 26.22 KG/M2 | SYSTOLIC BLOOD PRESSURE: 140 MMHG | DIASTOLIC BLOOD PRESSURE: 82 MMHG | HEIGHT: 63 IN | WEIGHT: 148 LBS

## 2020-10-28 PROCEDURE — 93015 CV STRESS TEST SUPVJ I&R: CPT | Performed by: INTERNAL MEDICINE

## 2020-10-28 PROCEDURE — 78452 HT MUSCLE IMAGE SPECT MULT: CPT | Performed by: INTERNAL MEDICINE

## 2020-10-28 PROCEDURE — A9502 TC99M TETROFOSMIN: HCPCS

## 2020-10-29 LAB
STRESS BASELINE DIAS BP: 82 MMHG
STRESS BASELINE HR: 72 BPM
STRESS BASELINE SYS BP: 140 MMHG
STRESS ESTIMATED WORKLOAD: 7 METS
STRESS EXERCISE DUR MIN: NORMAL MIN:SEC
STRESS PEAK DIAS BP: 68 MMHG
STRESS PEAK SYS BP: 180 MMHG
STRESS PERCENT HR ACHIEVED: 87 %
STRESS POST PEAK HR: 137 BPM
STRESS RATE PRESSURE PRODUCT: NORMAL BPM*MMHG
STRESS ST DEPRESSION: 0 MM
STRESS ST ELEVATION: 0 MM
STRESS TARGET HR: 157 BPM

## 2020-10-30 NOTE — PROGRESS NOTES
Good morning Ms. Ayon,    Your stress test is normal.  This suggests low concern for significant blockages in your heart arteries, which means there is low concern for heart disease at this time.     Thanks,  Lorraine Zamorano, FNP-BC

## 2021-02-16 ENCOUNTER — DOCUMENTATION ONLY (OUTPATIENT)
Dept: CARDIOLOGY CLINIC | Age: 66
End: 2021-02-16

## 2021-02-16 ENCOUNTER — OFFICE VISIT (OUTPATIENT)
Dept: CARDIOLOGY CLINIC | Age: 66
End: 2021-02-16
Payer: COMMERCIAL

## 2021-02-16 VITALS
SYSTOLIC BLOOD PRESSURE: 138 MMHG | HEIGHT: 63 IN | RESPIRATION RATE: 18 BRPM | BODY MASS INDEX: 25.87 KG/M2 | DIASTOLIC BLOOD PRESSURE: 80 MMHG | HEART RATE: 74 BPM | WEIGHT: 146 LBS | OXYGEN SATURATION: 98 %

## 2021-02-16 DIAGNOSIS — I83.893 VARICOSE VEINS OF BOTH LEGS WITH EDEMA: ICD-10-CM

## 2021-02-16 DIAGNOSIS — I25.10 CORONARY ARTERY DISEASE INVOLVING NATIVE CORONARY ARTERY OF NATIVE HEART WITHOUT ANGINA PECTORIS: ICD-10-CM

## 2021-02-16 DIAGNOSIS — I87.2 VENOUS INSUFFICIENCY OF BOTH LOWER EXTREMITIES: Primary | ICD-10-CM

## 2021-02-16 PROCEDURE — 99214 OFFICE O/P EST MOD 30 MIN: CPT | Performed by: INTERNAL MEDICINE

## 2021-02-16 RX ORDER — MELOXICAM 15 MG/1
TABLET ORAL
COMMUNITY
Start: 2021-02-08 | End: 2021-03-22

## 2021-02-16 NOTE — PROGRESS NOTES
1. Have you been to the ER, urgent care clinic since your last visit? Hospitalized since your last visit? No    2. Have you seen or consulted any other health care providers outside of the [address] [place] [address] since your last visit? Include any pap smears or colon screening.  No             Chief Complaint   Patient presents with    Varicose Veins     pt denies symptoms

## 2021-02-16 NOTE — PROGRESS NOTES
2/16/2021 3:50 PM      Subjective:     Jesus Palafox is seen in vascular clinic for f/u visit. Although conservative therapy does improve the symptoms intermittently, they do not alleviate them to the point that she is impaired in the daily activities of living. During prolonged periods of standing, the patient must sit or take a break due to aching, cramping, burning, itching, or swelling in the lower extremities. To compensate for this functional impairment, the patient has tried conservative therapies; however, these conservative measures have failed to resolve these symptoms. The conservative treatment measures include the following:   Compression Stocking Therapy  o Period of Time:   - []   Three Weeks  - [x]  Three Months  - []   Six Months         o Strength of Compression:   - []  10  20 mmHg  - [x]  20  30 mmHg  - [] 30  40 mmHg   Use of Over-The-Counter Analgesics   Exercise   Elevation of Lower Extremity Above Heart Level    She denies chest pain, chest pressure/discomfort, dyspnea, palpitations, irregular heart beats, near-syncope, syncope, fatigue, orthopnea, paroxysmal nocturnal dyspnea, exertional chest pressure/discomfort, claudication, lower extremity edema. Visit Vitals  /80 (BP 1 Location: Left upper arm, BP Patient Position: Sitting, BP Cuff Size: Adult)   Pulse 74   Resp 18   Ht 5' 3\" (1.6 m)   Wt 146 lb (66.2 kg)   SpO2 98%   BMI 25.86 kg/m²       Current Outpatient Medications   Medication Sig    meloxicam (MOBIC) 15 mg tablet TAKE 1 TABLET BY MOUTH EVERY DAY AS NEEDED FOR ARTHRITIS ORALLY 30 DAY(S)    isosorbide mononitrate ER (IMDUR) 30 mg tablet TAKE 1 TABLET BY MOUTH EVERY DAY TO PREVENT CHEST PAIN, CALL MD IF SIGNIFICANT HEADACHE    metoprolol succinate (TOPROL-XL) 50 mg XL tablet TAKE 1 TABLET BY MOUTH EVERY DAY    nitroglycerin (NITROSTAT) 0.4 mg SL tablet 1 Tab by SubLINGual route every five (5) minutes as needed for Chest Pain.  Up to 3 doses.    aspirin 81 mg chewable tablet Take 1 Tab by mouth daily.  atorvastatin (LIPITOR) 40 mg tablet Take 1 Tab by mouth nightly.  ticagrelor (BRILINTA) 90 mg tablet Take 1 Tab by mouth every twelve (12) hours every twelve (12) hours.  amLODIPine (NORVASC) 5 mg tablet Take 5 mg by mouth daily. Pt taking 1 and 1/2 tab daily     No current facility-administered medications for this visit.           Objective:      Visit Vitals  /80 (BP 1 Location: Left upper arm, BP Patient Position: Sitting, BP Cuff Size: Adult)   Pulse 74   Resp 18   Ht 5' 3\" (1.6 m)   Wt 146 lb (66.2 kg)   SpO2 98%   BMI 25.86 kg/m²       Data Review:       Past Medical History:   Diagnosis Date    CAD (coronary artery disease)     Hypertension       Past Surgical History:   Procedure Laterality Date    HX CORONARY STENT PLACEMENT  04/26/2017    HX GYN      vaginal wall repair    HX OTHER SURGICAL      surgery for fissure    CO BREAST SURGERY PROCEDURE UNLISTED      cystectomy x2     Allergies   Allergen Reactions    Codeine Nausea and Vomiting      Family History   Problem Relation Age of Onset    Heart Disease Mother     Heart Disease Father       Social History     Socioeconomic History    Marital status:      Spouse name: Not on file    Number of children: Not on file    Years of education: Not on file    Highest education level: Not on file   Occupational History    Not on file   Social Needs    Financial resource strain: Not on file    Food insecurity     Worry: Not on file     Inability: Not on file    Transportation needs     Medical: Not on file     Non-medical: Not on file   Tobacco Use    Smoking status: Never Smoker    Smokeless tobacco: Never Used   Substance and Sexual Activity    Alcohol use: Yes     Frequency: 4 or more times a week     Comment: 5-6 beers a week    Drug use: No    Sexual activity: Never   Lifestyle    Physical activity     Days per week: Not on file     Minutes per session: Not on file    Stress: Not on file   Relationships    Social connections     Talks on phone: Not on file     Gets together: Not on file     Attends [employer] service: Not on file     Active member of club or organization: Not on file     Attends meetings of clubs or organizations: Not on file     Relationship status: Not on file    Intimate partner violence     Fear of current or ex partner: Not on file     Emotionally abused: Not on file     Physically abused: Not on file     Forced sexual activity: Not on file   Other Topics Concern    Not on file   Social History Narrative    Not on file          Review of Systems     General: Not Present- Anorexia, Chills, Dietary Changes, Fatigue, Fever, Medication Changes, Night Sweats, Weight Gain > 10lbs. and Weight Loss > 10lbs. .  Skin: Not Present- Bruising and Excessive Sweating. HEENT: Not Present- Headache, Visual Loss and Vertigo. Respiratory: Not Present- Cough, Decreased Exercise Tolerance, Difficulty Breathing, Snoring and Wheezing. Cardiovascular: Not Present- Abnormal Blood Pressure, Chest Pain, Claudications, Difficulty Breathing On Exertion, Edema, Fainting / Blacking Out, Irregular Heart Beat, Night Cramps, Orthopnea, Palpitations, Paroxysmal Nocturnal Dyspnea, Rapid Heart Rate, Shortness of Breath and Swelling of Extremities. Gastrointestinal: Not Present- Black, Tarry Stool, Bloody Stool, Diarrhea, Hematemesis, Rectal Bleeding and Vomiting. Musculoskeletal: Not Present- Muscle Pain and Muscle Weakness. Neurological: Not Present- Dizziness. Psychiatric: Not Present- Depression. Endocrine: Not Present- Cold Intolerance, Heat Intolerance and Thyroid Problems. Hematology: Not Present- Abnormal Bleeding, Anemia, Blood Clots and Easy Bruising. Physical Exam   The physical exam findings are as follows:     General   Mental Status - Alert. General Appearance - Cooperative and Well groomed. Not in acute distress.  Orientation - Oriented to time, Oriented to place and Oriented to person. Build & Nutrition - Well developed. Skin   General: - Normal.      HEENT  Head - Normal.  Eye - Normal.  Mouth & Throat - Normal.      Neck   Carotid Arteries - normal upstroke. No Bruits. Thyroid: Gland - Normal size and consistency. Chest and Lung Exam   Inspection:   Chest Wall: - Normal. Accessory muscles - No use of accessory muscles in breathing. Auscultation:   Breath sounds: - Normal.      Cardiovascular   Inspection: Jugular vein - Bilateral - Inspection Normal.  Palpation/Percussion:   Apical Impulse: - Normal.  Auscultation: Rhythm - Regular. Heart Sounds - S1 WNL and S2 WNL. No S3 or S4. Murmurs & Other Heart Sounds: Auscultation of the heart reveals - No Murmurs. Abdomen   Palpation/Percussion: Palpation and Percussion of the abdomen reveal - No Palpable abdominal masses. Liver: - Normal.  Spleen: - Normal.  Auscultation: Auscultation of the abdomen reveals - Bowel sounds normal.      Neurologic   Mental Status: Affect - normal.  Motor: - Normal. Gait - Normal.      PHYSICIAN TO COMPLETE    Date of Physician Reevaluation:_______________________________  (To review results of trial of conservative therapy-lasting at least 3-6 months):  Patient is symptomatic with varicosities despite compliance with conservative therapy. Has failed conservative treatment. Check all that apply:  [] Other causes of patients leg(s) symptoms have been ruled out  [] Completed conservative treatment to include: compression stockings, medication, leg elevation, mild exercise & weight         reduction (as appropriate). Time length of conservative treatment[de-identified] ___________________    Patient is symptomatic with varicosities causing the following: (check all that apply):  [] Has persistent aching, cramping, burning, pain, itching, and/or swelling during activity or after prolonged standing.   [] Significant, recurrent superficial phlebitis  [] Hemorrhage from a ruptured varix  [] Non-healing skin ulceration of the leg  [] Other complications associated:  ___________________      Duplex or Doppler Ultrasound of the venous system demonstrate:  [] Absence of deep venous thrombosis  [] Greater and/or lesser saphenous vein or  valvular incompetence/reflux that correlates with        patients symptoms  []   valvular incompetence/reflux that correlates with patients symptoms         Assessment:       ICD-10-CM ICD-9-CM    1. Venous insufficiency of both lower extremities  I87.2 459.81    2. Varicose veins of both legs with edema  I83.893 454.8    3. Coronary artery disease involving native coronary artery of native heart without angina pectoris  I25.10 414.01        Plan:     - Varicose veins of LE , Venous insufficiency of both lower extremities  I have recommend RF ablation of bilateral GSV. The procedure(s) will be performed in my office using local tumescent anesthesia. she is anxious to move forward in finding relief. Patient seen and examined by me with nurse practitioner in vascular clinic.  I personally performed all components of the history, physical, and medical decision making and agree with the assessment and plan as noted. CEAP class 4. Compression stockings thigh. Photo taken.    - CAD: f/u with Dr. Debora Jarvis.   - BP controlled    Sujey Stanton MD  2/16/2021

## 2021-02-16 NOTE — PROGRESS NOTES
2/16/2021     «PatientPatriciaInsurancePayerPlan»  «PatientPrjeysonryInsuranceAddressFormatted»    Attention: Provider Services    Re: Predetermination of Medical Necessity and Plan Benefits    Patient Name   Lesly Devine  Date of Birth   1955    Subscriber Name  «DonaldoSubscriber»  Date of Birth   Click here to enter a date. Relationship to Patient  «DonaldoSubscriberRelatio»  Identification Number  «DonaldoMemberID»  Group Number   «[id number]»          To Whom It May Concern:    Lesly Devine is a 63 yrs old female who presented to my office for evaluation, diagnosis, and treatment. Lesly Devine reports that the symptoms she is experiencing are limiting her ability to perform the activities of daily living. The patient presented with   Aching/pain   [] One leg [x] Both legs  Heaviness     [] One leg [x] Both legs  Tiredness/fatigue   [] One leg [x] Both legs  Itching/burning   [] One leg [] Both legs  Swollen ankles   [] One leg [x] Both legs  Leg cramp    [] One leg [x] Both legs  Restless legs   [] One leg [] Both legs         Throbbing   [] One leg [x]Bothlegs. The patient states that she has suffered from these symptoms for long time, and, although conservative therapy does improve the symptoms intermittently, they do not alleviate them to the point that she is impaired in the daily activities of living. During prolonged periods of standing, the patient must sit or take a break due to aching, cramping, burning, itching, or swelling in the lower extremities. To compensate for this functional impairment, the patient has tried conservative therapies; however, these conservative measures have failed to resolve these symptoms.   The conservative treatment measures include the following:     Compression Stocking Therapy  o Period of Time:   - []   Three Weeks  - [x]  Three Months  - []   Six Months         o Strength of Compression: - []  10 - 20 mmHg  - [x]  20 - 30 mmHg  - [] 30 - 40 mmHg   Use of Over-The-Counter Analgesics     Exercise  o Frequency: daily     Elevation of Lower Extremity Above Heart Level  o Frequency: daily    A bilateral Duplex Ultrasound examination was performed, and the report is attached for review. The patients history reveals no presence of lymphedema, arterial insufficiency, or deep vein thrombosis; nor is the patient within six months post-partem. In conclusion, the summary of my findings and recommended, staged treatment include the following:    Diagnosis  Patient Active Problem List   Diagnosis Code    Unstable angina ([facility] [address] I20.0    Venous insufficiency of both lower extremities I87.2    Varicose veins of both legs with edema I83.893    Coronary artery disease involving native coronary artery of native heart without angina pectoris I25.10       Recommended Treatment    Bilateral GSV RF ablation. The procedure(s) will be performed in my office using local tumescent anesthesia. Enclosed you will find the supporting documentation of my findings, which include the following:     History and Physical Examination   Doppler Examination   Lower Extremity Patient Photographs    The patient has requested that we verify coverage for the recommended treatment under their insurance plan and, understandably, she is anxious to move forward in finding relief. I appreciate your review and response at your earliest convenience. Should you have any questions, please feel free to contact me at [phone].     Sincerely,    Fabrice Posey MD    [facility]

## 2021-03-09 NOTE — PROGRESS NOTES
Dick Sousa is a 63 y.o. female who was seen by synchronous (real-time) audio-video technology on 3/15/2021     [address] [place] [address]  [phone]     Subjective:      Dick Sousa is a 63 y.o. female is here for routine f/u. She has pmhx ASHD HTN HLD and venous insufficiency. Last seen by us in 9/2020 and Dr Shane Gunter in 2/2021. At her visit with us in 9/2020 she C/o  4 episodes of non exertional chest discomfort. We obtained NST which is posted below. Today, she denies any cardiac complaints. She walks 3 miles daily with no exertional symptoms. She received J&J covid vaccine last week. The patient denies chest pain/ shortness of breath, orthopnea, PND, LE edema, palpitations, syncope, or presyncope. NST 10/2020  · Baseline ECG: Sinus rhythm. · Gated SPECT: Left ventricular function post-stress was normal. Calculated ejection fraction is 67%. There is no evidence of transient ischemic dilation (TID).   · Left ventricular perfusion is normal.  · Myocardial perfusion imaging supports a low risk stress test.         Patient Active Problem List    Diagnosis Date Noted    Venous insufficiency of both lower extremities 07/21/2020    Varicose veins of both legs with edema 07/21/2020    Coronary artery disease involving native coronary artery of native heart without angina pectoris 07/21/2020    Unstable angina ([address] 04/26/2017      Earlene Aguillon MD  Past Medical History:   Diagnosis Date    CAD (coronary artery disease)     Hypertension       Past Surgical History:   Procedure Laterality Date    HX CORONARY STENT PLACEMENT  04/26/2017    HX GYN      vaginal wall repair    HX OTHER SURGICAL      surgery for fissure    AL BREAST SURGERY PROCEDURE UNLISTED      cystectomy x2     Allergies   Allergen Reactions    Codeine Nausea and Vomiting      Family History   Problem Relation Age of Onset    Heart Disease Mother     Heart Disease Father Social History     Socioeconomic History    Marital status:      Spouse name: Not on file    Number of children: Not on file    Years of education: Not on file    Highest education level: Not on file   Occupational History    Not on file   Social Needs    Financial resource strain: Not on file    Food insecurity     Worry: Not on file     Inability: Not on file    Transportation needs     Medical: Not on file     Non-medical: Not on file   Tobacco Use    Smoking status: Never Smoker    Smokeless tobacco: Never Used   Substance and Sexual Activity    Alcohol use: Yes     Frequency: 4 or more times a week     Comment: 5-6 beers a week    Drug use: No    Sexual activity: Never   Lifestyle    Physical activity     Days per week: Not on file     Minutes per session: Not on file    Stress: Not on file   Relationships    Social connections     Talks on phone: Not on file     Gets together: Not on file     Attends Orthodox service: Not on file     Active member of club or organization: Not on file     Attends meetings of clubs or organizations: Not on file     Relationship status: Not on file    Intimate partner violence     Fear of current or ex partner: Not on file     Emotionally abused: Not on file     Physically abused: Not on file     Forced sexual activity: Not on file   Other Topics Concern    Not on file   Social History Narrative    Not on file      Current Outpatient Medications   Medication Sig    rosuvastatin (Crestor) 40 mg tablet Take 1 Tab by mouth nightly.     meloxicam (MOBIC) 15 mg tablet TAKE 1 TABLET BY MOUTH EVERY DAY AS NEEDED FOR ARTHRITIS ORALLY 30 DAY(S)    isosorbide mononitrate ER (IMDUR) 30 mg tablet TAKE 1 TABLET BY MOUTH EVERY DAY TO PREVENT CHEST PAIN, CALL MD IF SIGNIFICANT HEADACHE    metoprolol succinate (TOPROL-XL) 50 mg XL tablet TAKE 1 TABLET BY MOUTH EVERY DAY    nitroglycerin (NITROSTAT) 0.4 mg SL tablet 1 Tab by SubLINGual route every five (5) minutes as needed for Chest Pain. Up to 3 doses.  aspirin 81 mg chewable tablet Take 1 Tab by mouth daily.  amLODIPine (NORVASC) 5 mg tablet Take 5 mg by mouth daily. Pt taking 1 and 1/2 tab daily     No current facility-administered medications for this visit. Review of Symptoms:  11 systems reviewed, negative other than as stated in the HPI    Physical Exam:    Vitals:    03/15/21 1128   BP: 130/80   Pulse: 86   Resp: 16   SpO2: (!) 86%   Weight: 149 lb 4.8 oz (67.7 kg)   Height: 5' 3\" (1.6 m)       Body mass index is 26.45 kg/m². General PE  General PE  Gen:  NAD  Mental Status - Alert. General Appearance - Not in acute distress. HEENT:  PERRL, no carotid bruits or JVD  Chest and Lung Exam   Inspection: Accessory muscles - No use of accessory muscles in breathing. Auscultation:   Breath sounds: - Normal.   Cardiovascular   Inspection: Jugular vein - Bilateral - Inspection Normal.   Palpation/Percussion:   Apical Impulse: - Normal.   Auscultation: Rhythm - Regular. Heart Sounds - S1 WNL and S2 WNL. No S3 or S4. Murmurs & Other Heart Sounds: Auscultation of the heart reveals - No Murmurs. Peripheral Vascular   Upper Extremity: Inspection - Bilateral - No Cyanotic nailbeds or Digital clubbing. Lower Extremity:   Palpation: Edema - Bilateral - No edema. Abdomen:   Soft, non-tender, bowel sounds are active. Neuro: A&O times 3, CN and motor grossly WNL  Physical exam by nurse practitioner Cecily Ames in office and using audiovisual connection with [email or web address] me between the patient and myself.     Labs:   Lab Results   Component Value Date/Time    Cholesterol, total 179 03/09/2021 09:23 AM    Cholesterol, total 208 (H) 04/27/2017 03:41 AM    HDL Cholesterol 56 03/09/2021 09:23 AM    HDL Cholesterol 51 04/27/2017 03:41 AM    LDL, calculated 90 03/09/2021 09:23 AM    LDL, calculated 125.6 (H) 04/27/2017 03:41 AM    Triglyceride 198 (H) 03/09/2021 09:23 AM    Triglyceride 157 (H) 04/27/2017 03:41 AM CHOL/HDL Ratio 4.1 04/27/2017 03:41 AM     Lab Results   Component Value Date/Time    CK 86 04/26/2017 08:30 AM     Lab Results   Component Value Date/Time    Sodium 140 03/09/2021 09:23 AM    Potassium 4.5 03/09/2021 09:23 AM    Chloride 104 03/09/2021 09:23 AM    CO2 23 03/09/2021 09:23 AM    Anion gap 10 10/29/2019 04:35 AM    Glucose 115 (H) 03/09/2021 09:23 AM    BUN 20 03/09/2021 09:23 AM    Creatinine 0.59 03/09/2021 09:23 AM    BUN/Creatinine ratio 34 (H) 03/09/2021 09:23 AM    GFR est  03/09/2021 09:23 AM    GFR est non-AA 98 03/09/2021 09:23 AM    Calcium 9.4 03/09/2021 09:23 AM    Bilirubin, total 0.4 03/09/2021 09:23 AM    Alk. phosphatase 92 03/09/2021 09:23 AM    Protein, total 7.1 03/09/2021 09:23 AM    Albumin 4.8 03/09/2021 09:23 AM    Globulin 3.6 05/07/2017 04:16 PM    A-G Ratio 2.1 03/09/2021 09:23 AM    ALT (SGPT) 27 03/09/2021 09:23 AM          Assessment:      1. ASHD (arteriosclerotic heart disease)    2. Essential hypertension    3. Mixed hyperlipidemia    4.  Venous insufficiency of both lower extremities        Orders Placed This Encounter    CK     Standing Status:   Future     Standing Expiration Date:   3/15/2022    LIPID PANEL     Standing Status:   Future     Standing Expiration Date:   3/66/7818    METABOLIC PANEL, COMPREHENSIVE     Standing Status:   Future     Standing Expiration Date:   3/15/2022    AMB POC EKG ROUTINE W/ 12 LEADS, INTER & REP     Order Specific Question:   Reason for Exam:     Answer:   routine        Plan:     ASHD  S/p PCI/DARRELL for ISR to prox LAD 10/19 performed by DR Rankin et al  Negative NST in 10/2020; 12/19  Continue ASA dc brilinta   Continue BB CCB Imdur statin  Completed cardiac rehab 15/36 sessionsshe is walking 3 miles daily  Recall: did not tolerate higher dose of Imdur, doing fine with 30 mg      HTN  Controlled with current therapy         HLD  3/2021 LDL 90 we switched from atorvastatin to rosuvastatin  Will repeat labs in 3 mos      Venous insufficiency  Followed by Dr Oleg Edwards, seen 2/2021  Plan is for RF ablation of bilateral GSV      Hx dizziness, resolved  She will call us for recurrent symptom and will likely order event monitor then     Counseled on diet and exercise- eventual goal of 30-60 minutes 5-7 times a week as per AHA guidelines. She is doing great, walking 3 miles daily without any exertional symptoms. Continue current care and f/u in 1 year, sooner PRN. Flakita Sandoval MD    This enhanced virtual visit was conducted using medical assistant and nurse practitioner in the office and using audiovisual connection between the patient and myself with [employer] services. Pursuant to the emergency declaration under the [address] waiver authority and the [employer] Act, this Virtual Visit was conducted, with patient's consent, to reduce the patient's risk of exposure to COVID-19 and provide continuity of care for an established patient. Services were provided through a video synchronous discussion virtually to substitute for in-person clinic visit.

## 2021-03-11 ENCOUNTER — TELEPHONE (OUTPATIENT)
Dept: CARDIOLOGY CLINIC | Age: 66
End: 2021-03-11

## 2021-03-11 LAB
ALBUMIN SERPL-MCNC: 4.8 G/DL (ref 3.8–4.8)
ALBUMIN/GLOB SERPL: 2.1 {RATIO} (ref 1.2–2.2)
ALP SERPL-CCNC: 92 IU/L (ref 39–117)
ALT SERPL-CCNC: 27 IU/L (ref 0–32)
AST SERPL-CCNC: 23 IU/L (ref 0–40)
BILIRUB SERPL-MCNC: 0.4 MG/DL (ref 0–1.2)
BUN SERPL-MCNC: 20 MG/DL (ref 8–27)
BUN/CREAT SERPL: 34 (ref 12–28)
CALCIUM SERPL-MCNC: 9.4 MG/DL (ref 8.7–10.3)
CHLORIDE SERPL-SCNC: 104 MMOL/L (ref 96–106)
CHOLEST SERPL-MCNC: 179 MG/DL (ref 100–199)
CK SERPL-CCNC: 85 U/L (ref 32–182)
CO2 SERPL-SCNC: 23 MMOL/L (ref 20–29)
CREAT SERPL-MCNC: 0.59 MG/DL (ref 0.57–1)
GLOBULIN SER CALC-MCNC: 2.3 G/DL (ref 1.5–4.5)
GLUCOSE SERPL-MCNC: 115 MG/DL (ref 65–99)
HDLC SERPL-MCNC: 56 MG/DL
IMP & REVIEW OF LAB RESULTS: NORMAL
LDLC SERPL CALC-MCNC: 90 MG/DL (ref 0–99)
POTASSIUM SERPL-SCNC: 4.5 MMOL/L (ref 3.5–5.2)
PROT SERPL-MCNC: 7.1 G/DL (ref 6–8.5)
SODIUM SERPL-SCNC: 140 MMOL/L (ref 134–144)
TRIGL SERPL-MCNC: 198 MG/DL (ref 0–149)
VLDLC SERPL CALC-MCNC: 33 MG/DL (ref 5–40)

## 2021-03-11 RX ORDER — ROSUVASTATIN CALCIUM 40 MG/1
40 TABLET, COATED ORAL
COMMUNITY
End: 2021-03-11 | Stop reason: SDUPTHER

## 2021-03-11 RX ORDER — ROSUVASTATIN CALCIUM 40 MG/1
40 TABLET, COATED ORAL
Qty: 30 TAB | Refills: 3 | Status: SHIPPED | OUTPATIENT
Start: 2021-03-11 | End: 2021-06-02

## 2021-03-11 NOTE — PROGRESS NOTES
LDL is better but not at goal of 70 given her CAD. I would like for her to switch to rosuvastatin 40 mg and repeat labs in 3 mos.   Other labs ok/stable such as lytes / kidneys / liver fxn

## 2021-03-11 NOTE — TELEPHONE ENCOUNTER
----- Message from Kristian Richardson NP sent at 3/11/2021  8:53 AM EST -----  LDL is better but not at goal of 70 given her CAD. I would like for her to switch to rosuvastatin 40 mg and repeat labs in 3 mos.   Other labs ok/stable such as lytes / kidneys / liver fxn

## 2021-03-15 ENCOUNTER — OFFICE VISIT (OUTPATIENT)
Dept: CARDIOLOGY CLINIC | Age: 66
End: 2021-03-15
Payer: COMMERCIAL

## 2021-03-15 VITALS
RESPIRATION RATE: 16 BRPM | HEIGHT: 63 IN | WEIGHT: 149.3 LBS | SYSTOLIC BLOOD PRESSURE: 130 MMHG | OXYGEN SATURATION: 86 % | HEART RATE: 86 BPM | BODY MASS INDEX: 26.45 KG/M2 | DIASTOLIC BLOOD PRESSURE: 80 MMHG

## 2021-03-15 DIAGNOSIS — E78.2 MIXED HYPERLIPIDEMIA: ICD-10-CM

## 2021-03-15 DIAGNOSIS — I10 ESSENTIAL HYPERTENSION: ICD-10-CM

## 2021-03-15 DIAGNOSIS — I25.10 ASHD (ARTERIOSCLEROTIC HEART DISEASE): Primary | ICD-10-CM

## 2021-03-15 DIAGNOSIS — I87.2 VENOUS INSUFFICIENCY OF BOTH LOWER EXTREMITIES: ICD-10-CM

## 2021-03-15 PROCEDURE — 93000 ELECTROCARDIOGRAM COMPLETE: CPT | Performed by: INTERNAL MEDICINE

## 2021-03-15 PROCEDURE — 99214 OFFICE O/P EST MOD 30 MIN: CPT | Performed by: INTERNAL MEDICINE

## 2021-03-15 NOTE — LETTER
3/15/2021 Patient: Colonel Milian YOB: 1955 Date of Visit: 3/15/2021 Prosper Hill MD 
[address] 
[address] [address] Via Fax: [phone] Dear Prosper Hill MD, Thank you for referring Ms. Colonel Milian to [address] [place] [address] for evaluation. My notes for this consultation are attached. If you have questions, please do not hesitate to call me. I look forward to following your patient along with you.  
 
 
Sincerely, 
 
Trinity Ashby MD

## 2021-03-15 NOTE — PROGRESS NOTES
Chief Complaint   Patient presents with    Follow-up     ASHD    Cholesterol Problem    Hypertension     1. Have you been to the ER, urgent care clinic since your last visit? No  Hospitalized since your last visit? No    2. Have you seen or consulted any other health care providers outside of the [address] [place] [address] since your last visit? No Include any pap smears or colon screening. No    Patient has no cardiac complaints.

## 2021-03-17 ENCOUNTER — APPOINTMENT (OUTPATIENT)
Dept: GENERAL RADIOLOGY | Age: 66
End: 2021-03-17
Attending: STUDENT IN AN ORGANIZED HEALTH CARE EDUCATION/TRAINING PROGRAM
Payer: COMMERCIAL

## 2021-03-17 ENCOUNTER — HOSPITAL ENCOUNTER (OUTPATIENT)
Age: 66
Setting detail: OBSERVATION
Discharge: HOME | End: 2021-03-18
Attending: STUDENT IN AN ORGANIZED HEALTH CARE EDUCATION/TRAINING PROGRAM | Admitting: HOSPITALIST
Payer: COMMERCIAL

## 2021-03-17 DIAGNOSIS — I20.0 UNSTABLE ANGINA (HCC): ICD-10-CM

## 2021-03-17 DIAGNOSIS — R07.9 CHEST PAIN, UNSPECIFIED TYPE: Primary | ICD-10-CM

## 2021-03-17 DIAGNOSIS — I25.119 CORONARY ARTERY DISEASE INVOLVING NATIVE HEART WITH ANGINA PECTORIS, UNSPECIFIED VESSEL OR LESION TYPE (HCC): ICD-10-CM

## 2021-03-17 DIAGNOSIS — I25.10 CORONARY ARTERY DISEASE INVOLVING NATIVE CORONARY ARTERY OF NATIVE HEART WITHOUT ANGINA PECTORIS: ICD-10-CM

## 2021-03-17 PROBLEM — I10 HTN (HYPERTENSION): Status: ACTIVE | Noted: 2021-03-17

## 2021-03-17 PROBLEM — E78.5 HLD (HYPERLIPIDEMIA): Status: ACTIVE | Noted: 2021-03-17

## 2021-03-17 LAB
ALBUMIN SERPL-MCNC: 4 G/DL (ref 3.5–5)
ALBUMIN/GLOB SERPL: 1.1 {RATIO} (ref 1.1–2.2)
ALP SERPL-CCNC: 91 U/L (ref 45–117)
ALT SERPL-CCNC: 47 U/L (ref 12–78)
ANION GAP SERPL CALC-SCNC: 3 MMOL/L (ref 5–15)
AST SERPL-CCNC: 16 U/L (ref 15–37)
ATRIAL RATE: 81 BPM
BASOPHILS # BLD: 0 K/UL (ref 0–0.1)
BASOPHILS NFR BLD: 1 % (ref 0–1)
BILIRUB SERPL-MCNC: 0.5 MG/DL (ref 0.2–1)
BUN SERPL-MCNC: 16 MG/DL (ref 6–20)
BUN/CREAT SERPL: 30 (ref 12–20)
CALCIUM SERPL-MCNC: 8.8 MG/DL (ref 8.5–10.1)
CALCULATED P AXIS, ECG09: 29 DEGREES
CALCULATED R AXIS, ECG10: 24 DEGREES
CALCULATED T AXIS, ECG11: 29 DEGREES
CHLORIDE SERPL-SCNC: 108 MMOL/L (ref 97–108)
CK SERPL-CCNC: 88 U/L (ref 26–192)
CO2 SERPL-SCNC: 27 MMOL/L (ref 21–32)
CREAT SERPL-MCNC: 0.53 MG/DL (ref 0.55–1.02)
DIAGNOSIS, 93000: NORMAL
DIFFERENTIAL METHOD BLD: NORMAL
EOSINOPHIL # BLD: 0 K/UL (ref 0–0.4)
EOSINOPHIL NFR BLD: 0 % (ref 0–7)
ERYTHROCYTE [DISTWIDTH] IN BLOOD BY AUTOMATED COUNT: 13 % (ref 11.5–14.5)
GLOBULIN SER CALC-MCNC: 3.5 G/DL (ref 2–4)
GLUCOSE SERPL-MCNC: 124 MG/DL (ref 65–100)
HCT VFR BLD AUTO: 38.8 % (ref 35–47)
HGB BLD-MCNC: 13.1 G/DL (ref 11.5–16)
IMM GRANULOCYTES # BLD AUTO: 0 K/UL (ref 0–0.04)
IMM GRANULOCYTES NFR BLD AUTO: 0 % (ref 0–0.5)
LYMPHOCYTES # BLD: 2.3 K/UL (ref 0.8–3.5)
LYMPHOCYTES NFR BLD: 36 % (ref 12–49)
MCH RBC QN AUTO: 30.8 PG (ref 26–34)
MCHC RBC AUTO-ENTMCNC: 33.8 G/DL (ref 30–36.5)
MCV RBC AUTO: 91.3 FL (ref 80–99)
MONOCYTES # BLD: 0.5 K/UL (ref 0–1)
MONOCYTES NFR BLD: 8 % (ref 5–13)
NEUTS SEG # BLD: 3.4 K/UL (ref 1.8–8)
NEUTS SEG NFR BLD: 55 % (ref 32–75)
NRBC # BLD: 0 K/UL (ref 0–0.01)
NRBC BLD-RTO: 0 PER 100 WBC
P-R INTERVAL, ECG05: 150 MS
PLATELET # BLD AUTO: 264 K/UL (ref 150–400)
PMV BLD AUTO: 10.7 FL (ref 8.9–12.9)
POTASSIUM SERPL-SCNC: 3.9 MMOL/L (ref 3.5–5.1)
PROT SERPL-MCNC: 7.5 G/DL (ref 6.4–8.2)
Q-T INTERVAL, ECG07: 384 MS
QRS DURATION, ECG06: 90 MS
QTC CALCULATION (BEZET), ECG08: 446 MS
RBC # BLD AUTO: 4.25 M/UL (ref 3.8–5.2)
SODIUM SERPL-SCNC: 138 MMOL/L (ref 136–145)
TROPONIN I SERPL-MCNC: 0.28 NG/ML
TROPONIN I SERPL-MCNC: <0.05 NG/ML
VENTRICULAR RATE, ECG03: 81 BPM
WBC # BLD AUTO: 6.2 K/UL (ref 3.6–11)

## 2021-03-17 PROCEDURE — 84484 ASSAY OF TROPONIN QUANT: CPT

## 2021-03-17 PROCEDURE — 99218 HC RM OBSERVATION: CPT

## 2021-03-17 PROCEDURE — 71045 X-RAY EXAM CHEST 1 VIEW: CPT

## 2021-03-17 PROCEDURE — 74011250636 HC RX REV CODE- 250/636: Performed by: HOSPITALIST

## 2021-03-17 PROCEDURE — 96372 THER/PROPH/DIAG INJ SC/IM: CPT

## 2021-03-17 PROCEDURE — 99285 EMERGENCY DEPT VISIT HI MDM: CPT

## 2021-03-17 PROCEDURE — 85025 COMPLETE CBC W/AUTO DIFF WBC: CPT

## 2021-03-17 PROCEDURE — 36415 COLL VENOUS BLD VENIPUNCTURE: CPT

## 2021-03-17 PROCEDURE — 82550 ASSAY OF CK (CPK): CPT

## 2021-03-17 PROCEDURE — 74011250637 HC RX REV CODE- 250/637: Performed by: HOSPITALIST

## 2021-03-17 PROCEDURE — 74011250637 HC RX REV CODE- 250/637: Performed by: STUDENT IN AN ORGANIZED HEALTH CARE EDUCATION/TRAINING PROGRAM

## 2021-03-17 PROCEDURE — 80053 COMPREHEN METABOLIC PANEL: CPT

## 2021-03-17 PROCEDURE — 93005 ELECTROCARDIOGRAM TRACING: CPT

## 2021-03-17 PROCEDURE — 99220 PR INITIAL OBSERVATION CARE/DAY 70 MINUTES: CPT | Performed by: INTERNAL MEDICINE

## 2021-03-17 RX ORDER — ACETAMINOPHEN 650 MG/1
650 SUPPOSITORY RECTAL
Status: DISCONTINUED | OUTPATIENT
Start: 2021-03-17 | End: 2021-03-18 | Stop reason: HOSPADM

## 2021-03-17 RX ORDER — ONDANSETRON 2 MG/ML
4 INJECTION INTRAMUSCULAR; INTRAVENOUS
Status: DISCONTINUED | OUTPATIENT
Start: 2021-03-17 | End: 2021-03-18 | Stop reason: HOSPADM

## 2021-03-17 RX ORDER — METOPROLOL SUCCINATE 50 MG/1
50 TABLET, EXTENDED RELEASE ORAL DAILY
Status: DISCONTINUED | OUTPATIENT
Start: 2021-03-17 | End: 2021-03-18 | Stop reason: HOSPADM

## 2021-03-17 RX ORDER — ACETAMINOPHEN 325 MG/1
650 TABLET ORAL
Status: DISCONTINUED | OUTPATIENT
Start: 2021-03-17 | End: 2021-03-18 | Stop reason: HOSPADM

## 2021-03-17 RX ORDER — ENOXAPARIN SODIUM 100 MG/ML
40 INJECTION SUBCUTANEOUS DAILY
Status: COMPLETED | OUTPATIENT
Start: 2021-03-17 | End: 2021-03-17

## 2021-03-17 RX ORDER — SODIUM CHLORIDE 9 MG/ML
75 INJECTION, SOLUTION INTRAVENOUS CONTINUOUS
Status: DISPENSED | OUTPATIENT
Start: 2021-03-18 | End: 2021-03-18

## 2021-03-17 RX ORDER — SODIUM CHLORIDE 0.9 % (FLUSH) 0.9 %
5-40 SYRINGE (ML) INJECTION AS NEEDED
Status: DISCONTINUED | OUTPATIENT
Start: 2021-03-17 | End: 2021-03-18 | Stop reason: HOSPADM

## 2021-03-17 RX ORDER — POLYETHYLENE GLYCOL 3350 17 G/17G
17 POWDER, FOR SOLUTION ORAL DAILY PRN
Status: DISCONTINUED | OUTPATIENT
Start: 2021-03-17 | End: 2021-03-18 | Stop reason: HOSPADM

## 2021-03-17 RX ORDER — PROMETHAZINE HYDROCHLORIDE 25 MG/1
12.5 TABLET ORAL
Status: DISCONTINUED | OUTPATIENT
Start: 2021-03-17 | End: 2021-03-18 | Stop reason: HOSPADM

## 2021-03-17 RX ORDER — ROSUVASTATIN CALCIUM 40 MG/1
40 TABLET, COATED ORAL
Status: DISCONTINUED | OUTPATIENT
Start: 2021-03-17 | End: 2021-03-18 | Stop reason: HOSPADM

## 2021-03-17 RX ORDER — NITROGLYCERIN 0.4 MG/1
0.4 TABLET SUBLINGUAL ONCE
Status: COMPLETED | OUTPATIENT
Start: 2021-03-17 | End: 2021-03-17

## 2021-03-17 RX ORDER — GUAIFENESIN 100 MG/5ML
81 LIQUID (ML) ORAL DAILY
Status: DISCONTINUED | OUTPATIENT
Start: 2021-03-18 | End: 2021-03-18 | Stop reason: HOSPADM

## 2021-03-17 RX ORDER — SODIUM CHLORIDE 0.9 % (FLUSH) 0.9 %
5-40 SYRINGE (ML) INJECTION EVERY 8 HOURS
Status: DISCONTINUED | OUTPATIENT
Start: 2021-03-17 | End: 2021-03-18 | Stop reason: HOSPADM

## 2021-03-17 RX ADMIN — NITROGLYCERIN 0.4 MG: 0.4 TABLET, ORALLY DISINTEGRATING SUBLINGUAL at 11:29

## 2021-03-17 RX ADMIN — ENOXAPARIN SODIUM 40 MG: 40 INJECTION SUBCUTANEOUS at 14:32

## 2021-03-17 RX ADMIN — AMLODIPINE BESYLATE 7.5 MG: 5 TABLET ORAL at 14:32

## 2021-03-17 RX ADMIN — Medication 10 ML: at 17:30

## 2021-03-17 RX ADMIN — ACETAMINOPHEN 650 MG: 325 TABLET ORAL at 17:27

## 2021-03-17 RX ADMIN — ROSUVASTATIN CALCIUM 40 MG: 40 TABLET, FILM COATED ORAL at 23:21

## 2021-03-17 RX ADMIN — NITROGLYCERIN 1 INCH: 20 OINTMENT TOPICAL at 17:27

## 2021-03-17 RX ADMIN — METOPROLOL SUCCINATE 50 MG: 50 TABLET, EXTENDED RELEASE ORAL at 14:32

## 2021-03-17 RX ADMIN — NITROGLYCERIN 1 INCH: 20 OINTMENT TOPICAL at 14:33

## 2021-03-17 RX ADMIN — Medication 10 ML: at 23:19

## 2021-03-17 NOTE — PROGRESS NOTES
Problem: Cath Lab Procedures: Pre-Procedure  Goal: Off Pathway (Use only if patient is Off Pathway)  Outcome: Progressing Towards Goal  Goal: Activity/Safety  Outcome: Progressing Towards Goal  Goal: Consults, if ordered  Outcome: Progressing Towards Goal  Goal: Nutrition/Diet  Outcome: Progressing Towards Goal  Goal: Medications  Outcome: Progressing Towards Goal  Goal: *Consent signed  Outcome: Progressing Towards Goal

## 2021-03-17 NOTE — H&P
Hospitalist Admission Note    NAME: Lc Caldera   :  1955   MRN:  [id number]     Date/Time:  3/17/2021 1:32 PM    Patient PCP: Yenni Deras MD  Cardiologist:  Dr. Dariana Rodriguez    Please note that this dictation was completed with Nadyne Flavors, the computer voice recognition software. Quite often unanticipated grammatical, syntax, homophones, and other interpretive errors are inadvertently transcribed by the computer software. Please disregard these errors. Please excuse any errors that have escaped final proofreading  ______________________________________________________________________   Assessment & Plan:  Unstable angina, rule out nstemi  CAD hx 2 stents last in 10/19 to LAD  --obs, serial troponin, cardiology consult, plan for cath in AM.  No anticoagulation unless troponin rises or chest pain returns per Dr. Say Pinto  --hold imdur, change to nitropaste 1\"q6h for now  --continue aspirin, toprol, crestor  --lovenox for DVT prophylaxis x 1 today    HTN  --continue toprol XL, amlodipine    Hyperlipidemia  --LDL not at goal at 90 this month and was just changed from lipitor to crestor 2 days ago by Dr. Dariana Rodriguez. To have repeat lipid panel in 3 months    Venous insufficiency in legs  --planned to have ablation of b/l greater saphenous vein by Dr. Say Pinto in future    Body mass index is 26.2 kg/m². Code: full  DVT prophylaxis: lovenox  Surrogate decision maker:            Subjective:   CHIEF COMPLAINT:  Chest pain    HISTORY OF PRESENT ILLNESS:     Lc Caldera is a 63 y.o. female with PMH CAD, hx 2 stents presents with onset of SSCP at 8:30am this morning, radiate up into jaw and into left arm 8/10. Pain similar to when needed stents in past.  Last stent placed 10/19 to LAD. Pain lasted for 3.5 hours total and resolved with 3rd SL nitro in ER. She typically has random CP episodes 1-2x/month usually relieved with 2 sublingual nitro. No chest pain in past 2 months.   Had J&J vaccine on 3/11/21. Saw Dr. Angelika Vera 3/15 by virtual visit and told to stop Brilinta, lipitor changed to crestor since LDL not at goal.     We were asked to admit for work up and evaluation of the above problems. Past Medical History:   Diagnosis Date    CAD (coronary artery disease)     Hypertension       Past Surgical History:   Procedure Laterality Date    HX CORONARY STENT PLACEMENT  04/26/2017    HX GYN      vaginal wall repair    HX OTHER SURGICAL      surgery for fissure    NC BREAST SURGERY PROCEDURE UNLISTED      cystectomy x2     Social History     Tobacco Use    Smoking status: Never Smoker    Smokeless tobacco: Never Used   Substance Use Topics    Alcohol use: Yes     Frequency: 4 or more times a week     Comment: 5-6 beers a week     at [facility].       Family History   Problem Relation Age of Onset    Heart Disease Mother     Heart Disease Father      Allergies   Allergen Reactions    Codeine Nausea and Vomiting        Prior to Admission medications    Medication Sig Start Date End Date Taking? Authorizing Provider   rosuvastatin (Crestor) 40 mg tablet Take 1 Tab by mouth nightly. 3/11/21   Ale Prasad., NP   meloxicam (MOBIC) 15 mg tablet TAKE 1 TABLET BY MOUTH EVERY DAY AS NEEDED FOR ARTHRITIS ORALLY 30 DAY(S) 2/8/21   Provider, Historical   isosorbide mononitrate ER (IMDUR) 30 mg tablet TAKE 1 TABLET BY MOUTH EVERY DAY TO PREVENT CHEST PAIN, CALL MD IF SIGNIFICANT HEADACHE 12/14/20   Karmen Villaseñor MD   metoprolol succinate (TOPROL-XL) 50 mg XL tablet TAKE 1 TABLET BY MOUTH EVERY DAY 6/4/20   Provider, Historical   nitroglycerin (NITROSTAT) 0.4 mg SL tablet 1 Tab by SubLINGual route every five (5) minutes as needed for Chest Pain. Up to 3 doses. 10/29/19   Geovany Rankin MD   aspirin 81 mg chewable tablet Take 1 Tab by mouth daily. 4/27/17   Geovany Rankin MD   amLODIPine (NORVASC) 5 mg tablet Take 5 mg by mouth daily.  Pt taking 1 and 1/2 tab daily    Provider, Historical     REVIEW OF SYSTEMS:  POSITIVE= Bold. Negative = normal text  General:  fever, chills, sweats, generalized weakness, weight loss/gain, loss of appetite  Eyes:  blurred vision, eye pain, loss of vision, diplopia  Ear Nose and Throat:  rhinorrhea, pharyngitis  Respiratory:   cough, sputum production, SOB, wheezing, [place], pleuritic pain  Cardiology:  chest pain, palpitations, orthopnea, PND, edema, syncope   Gastrointestinal:  abdominal pain, N/V, dysphagia, diarrhea, constipation, bleeding  Genitourinary:  frequency, urgency, dysuria, hematuria, incontinence  Muskuloskeletal :  arthralgia, myalgia  Hematology:  easy bruising, bleeding, lymphadenopathy  Dermatological:  rash, ulceration, pruritis  Endocrine:  hot flashes or polydipsia  Neurological:  headache, dizziness, confusion, focal weakness, paresthesia, memory loss, gait disturbance  Psychological: anxiety, depression, agitation      Objective:   VITALS:    Visit Vitals  /75   Pulse 74   Temp 97.6 °F (36.4 °C)   Resp 15   Ht 5' 3\" (1.6 m)   Wt 67.1 kg (147 lb 14.9 oz)   SpO2 100%   BMI 26.20 kg/m²     Temp (24hrs), Av.6 °F (36.4 °C), Min:97.6 °F (36.4 °C), Max:97.6 °F (36.4 °C)    Body mass index is 26.2 kg/m². PHYSICAL EXAM:    General:    Alert, cooperative, no distress, appears stated age. HEENT: Atraumatic, anicteric sclerae, pink conjunctivae     No oral ulcers, mucosa moist, throat clear. Hearing intact. Neck:  Supple, symmetrical,  thyroid: non tender. No bruit  Lungs:   Clear to auscultation bilaterally. No Wheezing or Rhonchi. No rales. Chest wall:  No tenderness  No Accessory muscle use. Heart:   Regular  rhythm,  No  murmur   No gallop. No edema. Abdomen:   Soft, non-tender. Not distended. Bowel sounds normal. No masses  Extremities: No cyanosis. No clubbing  Skin:     Not pale Not Jaundiced  No rashes +varicose veins  Psych:  Good insight. Not depressed.   Not anxious or agitated. Neurologic: EOMs intact. No facial asymmetry. No aphasia or slurred speech. Symmetrical strength, Alert and oriented X 3. Peripheral pulse: Right, Radial, 2+  Capillary refill:  normal    IMAGING RESULTS:   []       I have personally reviewed the actual   []     CXR  []     CT scan  CXR:  CT :  EKG: reviewed, NSR no ST-T abnormality   ________________________________________________________________________  Care Plan discussed with:    Comments   Patient y    Family      RN     Care Manager                    Consultant:  erika Parish [place]   ________________________________________________________________________  Prophylaxis:  GI none   DVT lovenox   ________________________________________________________________________  Recommended Disposition:   Home with Family y   HH/PT/OT/RN    SNF/LTC    NELY    ________________________________________________________________________  Code Status:  Full Code y   DNR/DNI    ________________________________________________________________________  TOTAL TIME: 40 minutes      Comments    y Reviewed previous records   >50% of visit spent in counseling and coordination of care  Discussion with patient and/or family and questions answered         ______________________________________________________________________  Christiano Mecnhaca MD      Procedures: see electronic medical records for all procedures/Xrays and details which were not copied into this note but were reviewed prior to creation of Plan.     LAB DATA REVIEWED:    Recent Results (from the past 24 hour(s))   EKG, 12 LEAD, INITIAL    Collection Time: 03/17/21 10:40 AM   Result Value Ref Range    Ventricular Rate 81 BPM    Atrial Rate 81 BPM    P-R Interval 150 ms    QRS Duration 90 ms    Q-T Interval 384 ms    QTC Calculation (Bezet) 446 ms    Calculated P Axis 29 degrees    Calculated R Axis 24 degrees    Calculated T Axis 29 degrees    Diagnosis       Normal sinus rhythm  Normal ECG  When compared with ECG of 29-OCT-2019 06:07,  No significant change was found     CBC WITH AUTOMATED DIFF    Collection Time: 03/17/21 10:44 AM   Result Value Ref Range    WBC 6.2 3.6 - 11.0 K/uL    RBC 4.25 3.80 - 5.20 M/uL    HGB 13.1 11.5 - 16.0 g/dL    HCT 38.8 35.0 - 47.0 %    MCV 91.3 80.0 - 99.0 FL    MCH 30.8 26.0 - 34.0 PG    MCHC 33.8 30.0 - 36.5 g/dL    RDW 13.0 11.5 - 14.5 %    PLATELET 218 214 - 751 K/uL    MPV 10.7 8.9 - 12.9 FL    NRBC 0.0 0  WBC    ABSOLUTE NRBC 0.00 0.00 - 0.01 K/uL    NEUTROPHILS 55 32 - 75 %    LYMPHOCYTES 36 12 - 49 %    MONOCYTES 8 5 - 13 %    EOSINOPHILS 0 0 - 7 %    BASOPHILS 1 0 - 1 %    IMMATURE GRANULOCYTES 0 0.0 - 0.5 %    ABS. NEUTROPHILS 3.4 1.8 - 8.0 K/UL    ABS. LYMPHOCYTES 2.3 0.8 - 3.5 K/UL    ABS. MONOCYTES 0.5 0.0 - 1.0 K/UL    ABS. EOSINOPHILS 0.0 0.0 - 0.4 K/UL    ABS. BASOPHILS 0.0 0.0 - 0.1 K/UL    ABS. IMM. GRANS. 0.0 0.00 - 0.04 K/UL    DF AUTOMATED     CK W/ REFLX CKMB    Collection Time: 03/17/21 11:19 AM   Result Value Ref Range    CK 88 26 - 192 U/L   TROPONIN I    Collection Time: 03/17/21 11:19 AM   Result Value Ref Range    Troponin-I, Qt. <0.05 <5.68 ng/mL   METABOLIC PANEL, COMPREHENSIVE    Collection Time: 03/17/21 11:19 AM   Result Value Ref Range    Sodium 138 136 - 145 mmol/L    Potassium 3.9 3.5 - 5.1 mmol/L    Chloride 108 97 - 108 mmol/L    CO2 27 21 - 32 mmol/L    Anion gap 3 (L) 5 - 15 mmol/L    Glucose 124 (H) 65 - 100 mg/dL    BUN 16 6 - 20 MG/DL    Creatinine 0.53 (L) 0.55 - 1.02 MG/DL    BUN/Creatinine ratio 30 (H) 12 - 20      GFR est AA >60 >60 ml/min/1.73m2    GFR est non-AA >60 >60 ml/min/1.73m2    Calcium 8.8 8.5 - 10.1 MG/DL    Bilirubin, total 0.5 0.2 - 1.0 MG/DL    ALT (SGPT) 47 12 - 78 U/L    AST (SGOT) 16 15 - 37 U/L    Alk.  phosphatase 91 45 - 117 U/L    Protein, total 7.5 6.4 - 8.2 g/dL    Albumin 4.0 3.5 - 5.0 g/dL    Globulin 3.5 2.0 - 4.0 g/dL    A-G Ratio 1.1 1.1 - 2.2

## 2021-03-17 NOTE — ROUTINE PROCESS
End of Shift Note Bedside shift change report given to na  (oncoming nurse) by Michel Guerra RN (offgoing nurse). Report included the following information SBAR Shift worked:  day Shift summary and any significant changes:  
  patient is settled in, no complaints of chest pain. Medicated for headache Concerns for physician to address:  patient to go for cath tomorrow. Assess for further chest discomfort Zone phone for oncoming shift:   na Activity: 
  
Number times ambulated in hallways past shift: 0 Number of times OOB to chair past shift: 0 Cardiac:  
Cardiac Monitoring: No     
  
 
Access:  
Current line(s): PIV Genitourinary:  
Urinary status: voiding Respiratory:  
O2 Device: Room air Chronic home O2 use?: NO Incentive spirometer at bedside: NO 
  
GI: 
Last Bowel Movement Date: 03/16/21 Current diet:  DIET NPO 
DIET CARDIAC Regular Passing flatus: NO Tolerating current diet: YES Pain Management:  
Patient states pain is manageable on current regimen: YES Skin: 
Dewey Score: 21 Interventions: increase time out of bed Patient Safety: 
Fall Score: Total Score: 1 Interventions: pt to call before getting OOB Length of Stay: 
Expected LOS: - - - Actual LOS: 0 Michel Guerra RN

## 2021-03-17 NOTE — ED PROVIDER NOTES
EMERGENCY DEPARTMENT HISTORY AND PHYSICAL EXAM      Date: 3/17/2021  Patient Name: Jesse Mccain    History of Presenting Illness     Chief Complaint   Patient presents with    Chest Pain     mid sternal CP begining this AM unrelieved w. 2 nitro, reports nitro usually relieves her CP. Reports 2 previous cardiac stents         HPI: Jesse Mccain, 63 y.o. female presents to the ED with cc of chest pain. She describes a substernal, slightly to the right chest pain that started at 8:15 AM.  She describes this as a burning sensation that radiates to her jaw and down her left arm. She took 2 nitroglycerin, however the pain is not significantly improved. Does not seem to be worsened with exertion. She denies any associated shortness of breath, nausea or diaphoresis. Does state that she has had similar pain in the past in the setting of needing a prior stent. Denies any fevers or coughing, no new leg pain or leg swelling. States she saw her cardiologist Dr. Leslye Ortiz on Monday and was taken off her Brilinta. There are no other complaints, changes, or physical findings at this time. PCP: Brian Castro MD    No current facility-administered medications on file prior to encounter. Current Outpatient Medications on File Prior to Encounter   Medication Sig Dispense Refill    rosuvastatin (Crestor) 40 mg tablet Take 1 Tab by mouth nightly. 30 Tab 3    meloxicam (MOBIC) 15 mg tablet TAKE 1 TABLET BY MOUTH EVERY DAY AS NEEDED FOR ARTHRITIS ORALLY 30 DAY(S)      isosorbide mononitrate ER (IMDUR) 30 mg tablet TAKE 1 TABLET BY MOUTH EVERY DAY TO PREVENT CHEST PAIN, CALL MD IF SIGNIFICANT HEADACHE 90 Tab 3    metoprolol succinate (TOPROL-XL) 50 mg XL tablet TAKE 1 TABLET BY MOUTH EVERY DAY      nitroglycerin (NITROSTAT) 0.4 mg SL tablet 1 Tab by SubLINGual route every five (5) minutes as needed for Chest Pain. Up to 3 doses. 1 Bottle 5    aspirin 81 mg chewable tablet Take 1 Tab by mouth daily.  30 Tab 6  amLODIPine (NORVASC) 5 mg tablet Take 5 mg by mouth daily. Pt taking 1 and 1/2 tab daily         Past History     Past Medical History:  Past Medical History:   Diagnosis Date    CAD (coronary artery disease)     Hypertension        Past Surgical History:  Past Surgical History:   Procedure Laterality Date    HX CORONARY STENT PLACEMENT  04/26/2017    HX GYN      vaginal wall repair    HX OTHER SURGICAL      surgery for fissure    MI BREAST SURGERY PROCEDURE UNLISTED      cystectomy x2       Family History:  Family History   Problem Relation Age of Onset    Heart Disease Mother     Heart Disease Father        Social History:  Social History     Tobacco Use    Smoking status: Never Smoker    Smokeless tobacco: Never Used   Substance Use Topics    Alcohol use: Yes     Frequency: 4 or more times a week     Comment: 5-6 beers a week    Drug use: No       Allergies: Allergies   Allergen Reactions    Codeine Nausea and Vomiting         Review of Systems   no fever  No eye pain  No ear pain  no shortness of breath  Reports chest pain  no abdominal pain  no dysuria  no leg pain  No rash  No lymphadenopathy  No weight loss    Physical Exam   Physical Exam  Constitutional:       General: She is not in acute distress. HENT:      Head: Normocephalic. Eyes:      Extraocular Movements: Extraocular movements intact. Cardiovascular:      Rate and Rhythm: Normal rate and regular rhythm. Pulmonary:      Effort: Pulmonary effort is normal.      Breath sounds: Normal breath sounds. Chest:      Chest wall: No tenderness. Abdominal:      Palpations: Abdomen is soft. Tenderness: There is no abdominal tenderness. Musculoskeletal:      Right lower leg: She exhibits no tenderness. No edema. Left lower leg: She exhibits no tenderness. No edema. Skin:     General: Skin is warm. Neurological:      General: No focal deficit present. Mental Status: She is alert.    Psychiatric:         Mood and Affect: Mood normal.         Diagnostic Study Results     Labs -     Recent Results (from the past 24 hour(s))   EKG, 12 LEAD, INITIAL    Collection Time: 03/17/21 10:40 AM   Result Value Ref Range    Ventricular Rate 81 BPM    Atrial Rate 81 BPM    P-R Interval 150 ms    QRS Duration 90 ms    Q-T Interval 384 ms    QTC Calculation (Bezet) 446 ms    Calculated P Axis 29 degrees    Calculated R Axis 24 degrees    Calculated T Axis 29 degrees    Diagnosis       Normal sinus rhythm  Normal ECG  When compared with ECG of 29-OCT-2019 06:07,  No significant change was found     CBC WITH AUTOMATED DIFF    Collection Time: 03/17/21 10:44 AM   Result Value Ref Range    WBC 6.2 3.6 - 11.0 K/uL    RBC 4.25 3.80 - 5.20 M/uL    HGB 13.1 11.5 - 16.0 g/dL    HCT 38.8 35.0 - 47.0 %    MCV 91.3 80.0 - 99.0 FL    MCH 30.8 26.0 - 34.0 PG    MCHC 33.8 30.0 - 36.5 g/dL    RDW 13.0 11.5 - 14.5 %    PLATELET 030 203 - 803 K/uL    MPV 10.7 8.9 - 12.9 FL    NRBC 0.0 0  WBC    ABSOLUTE NRBC 0.00 0.00 - 0.01 K/uL    NEUTROPHILS 55 32 - 75 %    LYMPHOCYTES 36 12 - 49 %    MONOCYTES 8 5 - 13 %    EOSINOPHILS 0 0 - 7 %    BASOPHILS 1 0 - 1 %    IMMATURE GRANULOCYTES 0 0.0 - 0.5 %    ABS. NEUTROPHILS 3.4 1.8 - 8.0 K/UL    ABS. LYMPHOCYTES 2.3 0.8 - 3.5 K/UL    ABS. MONOCYTES 0.5 0.0 - 1.0 K/UL    ABS. EOSINOPHILS 0.0 0.0 - 0.4 K/UL    ABS. BASOPHILS 0.0 0.0 - 0.1 K/UL    ABS. IMM.  GRANS. 0.0 0.00 - 0.04 K/UL    DF AUTOMATED     CK W/ REFLX CKMB    Collection Time: 03/17/21 11:19 AM   Result Value Ref Range    CK 88 26 - 192 U/L   TROPONIN I    Collection Time: 03/17/21 11:19 AM   Result Value Ref Range    Troponin-I, Qt. <0.05 <6.31 ng/mL   METABOLIC PANEL, COMPREHENSIVE    Collection Time: 03/17/21 11:19 AM   Result Value Ref Range    Sodium 138 136 - 145 mmol/L    Potassium 3.9 3.5 - 5.1 mmol/L    Chloride 108 97 - 108 mmol/L    CO2 27 21 - 32 mmol/L    Anion gap 3 (L) 5 - 15 mmol/L    Glucose 124 (H) 65 - 100 mg/dL    BUN 16 6 - 20 MG/DL    Creatinine 0.53 (L) 0.55 - 1.02 MG/DL    BUN/Creatinine ratio 30 (H) 12 - 20      GFR est AA >60 >60 ml/min/1.73m2    GFR est non-AA >60 >60 ml/min/1.73m2    Calcium 8.8 8.5 - 10.1 MG/DL    Bilirubin, total 0.5 0.2 - 1.0 MG/DL    ALT (SGPT) 47 12 - 78 U/L    AST (SGOT) 16 15 - 37 U/L    Alk. phosphatase 91 45 - 117 U/L    Protein, total 7.5 6.4 - 8.2 g/dL    Albumin 4.0 3.5 - 5.0 g/dL    Globulin 3.5 2.0 - 4.0 g/dL    A-G Ratio 1.1 1.1 - 2.2         Radiologic Studies -   XR CHEST PORT   Final Result   No acute abnormality              CT Results  (Last 48 hours)    None        CXR Results  (Last 48 hours)               03/17/21 1113  XR CHEST PORT Final result    Impression:  No acute abnormality               Narrative:  EXAM:  XR CHEST PORT       INDICATION:  chest pain       COMPARISON:  2019       FINDINGS: A portable AP radiograph of the chest was obtained at 1103 hours. .    The lungs are clear. The cardiac and mediastinal contours and pulmonary   vascularity are normal.  Bony structures are unchanged. Medical Decision Making   I am the first provider for this patient. I reviewed the vital signs, available nursing notes, past medical history, past surgical history, family history and social history. Vital Signs-Reviewed the patient's vital signs. Patient Vitals for the past 24 hrs:   Temp Pulse Resp BP SpO2   03/17/21 1230  74 15 136/75    03/17/21 1130    (!) 160/78    03/17/21 1129  85  (!) 171/95    03/17/21 1037 97.6 °F (36.4 °C) 81 16 (!) 181/83 100 %         Provider Notes (Medical Decision Making):   27-year-old female presenting with chest pain. Her substernal pain that radiates to the jaw and arm and her significant history of coronary artery disease is concerning for ACS, unstable angina. Differential includes musculoskeletal pain, GERD, pleuritic pain.   No shortness of breath or concern for CHF or pneumonia or any other cardiopulmonary emergency. She is given additional nitroglycerin by mouth. EKG is performed at 10: 48, shows sinus rhythm at a rate of 81, , QRS 90, QTc of 446, axis upright, no ST segment elevation or depression concerning for ACS, this is interpreted as normal sinus rhythm. ED Course:     Initial assessment performed. The patients presenting problems have been discussed, and they are in agreement with the care plan formulated and outlined with them. I have encouraged them to ask questions as they arise throughout their visit. CBC negative for leukocytosis or anemia, basic metabolic panel shows normal renal function, troponin is negative, chest x-ray unremarkable. On reevaluation, the patient is resting comfortably, states that her chest pain is significantly improved. I spoken with Dr. Netta Arias, on-call for Dr. Heike Hayes, who agrees with admission. Patient in agreement with the plan. Critical Care Time:         Disposition:  Admit    PLAN:  1. Current Discharge Medication List        2.    Follow-up Information    None       Return to ED if worse     Diagnosis     Clinical Impression: Acute chest pain

## 2021-03-17 NOTE — ED NOTES
Dr. Joyce Caputo at bedside to evaluate patient. Patient presents to ED today for complaints of mid CP that started this morning around 0830. Patient reports taking 2 nitro with no relief. Pt. Reports an extensive cardiac history. PT. Alert and oriented x4. PT. Placed in position of comfort with call bell in reach.

## 2021-03-17 NOTE — Clinical Note
TRANSFER - OUT REPORT:     Verbal report given to: Omar Young. Report consisted of patient's Situation, Background, Assessment and   Recommendations(SBAR). Opportunity for questions and clarification was provided. Patient transported with a Registered Nurse. Patient transported to: [facility].

## 2021-03-17 NOTE — CONSULTS
[address] [facility]     Date of  Admission: 3/17/2021 10:56 AM     Admission type:Emergency    Consult for: Chest Pain   Consult by: Dr. Yang Helms     Subjective:     Dick Sousa is a 63 y.o. female with a PMH significant for ASHD, HTN, HLD, venous insufficieny of bilateral lower extremities admitted for Unstable angina ([facility] [address] [I20.0]. Per ED provider note, the patient presented this afternoon with chief complaint of chest pain unrelieved with SL nitro. Upon assessment, the patient states at about 8:15 this morning she developed mid-sternal chest pain which radiated up her neck to her jaw bilaterally. She described it as feeling \"different than other times\". She stated she was not performing any activity, but was at rest when it occurred. She took two SL nitro without relief and came to the ED. She endorses intermittent chest pain x the past month or so. She states she has a history of cardiac stents in 2018 and 2019 right femoral access both times. She denies any history of reflux, hiatal hernia, or GI issues. She reports compliance with medications and actually just saw her cardiologist Monday for a virtual visit. She states she has a strong family history for heart disease on both sides, including her father with CABG x 4 at age 48. She denies smoking, ETOH use, or illicit drug use. She also denies any associated shortness of breath, diaphoresis, syncope, palpitations, lightheadedness, dizziness, or nausea. She was tearful and states she has a \"high pain threshold\". SHe does endorse intermittent leg edema, chronic venous stasis which she states she wears compression stockings for. Established patient of Dr. Daune Bloch, last seen Monday. She has history of cardica cath 2018 and 2019 with stents to LAD. Nuclear stress 10/2020 negative. EKG with no acute changes.      Cardiac risk factors: family history, dyslipidemia, hypertension, stress, post-menopausal.    Patient Active Problem List    Diagnosis Date Noted    HTN (hypertension) 03/17/2021    HLD (hyperlipidemia) 03/17/2021    Venous insufficiency of both lower extremities 07/21/2020    Varicose veins of both legs with edema 07/21/2020    CAD (coronary artery disease) 07/21/2020    Unstable angina ([facility] [address] 04/26/2017      Jose Funes MD  Past Medical History:   Diagnosis Date    CAD (coronary artery disease)     Hypertension       Social History     Socioeconomic History    Marital status:      Spouse name: Not on file    Number of children: Not on file    Years of education: Not on file    Highest education level: Not on file   Tobacco Use    Smoking status: Never Smoker    Smokeless tobacco: Never Used   Substance and Sexual Activity    Alcohol use: Yes     Frequency: 4 or more times a week     Comment: 5-6 beers a week    Drug use: No    Sexual activity: Never     Allergies   Allergen Reactions    Codeine Nausea and Vomiting      Family History   Problem Relation Age of Onset    Heart Disease Mother     Heart Disease Father       Current Facility-Administered Medications   Medication Dose Route Frequency    sodium chloride (NS) flush 5-40 mL  5-40 mL IntraVENous Q8H    sodium chloride (NS) flush 5-40 mL  5-40 mL IntraVENous PRN    acetaminophen (TYLENOL) tablet 650 mg  650 mg Oral Q6H PRN    Or    acetaminophen (TYLENOL) suppository 650 mg  650 mg Rectal Q6H PRN    polyethylene glycol (MIRALAX) packet 17 g  17 g Oral DAILY PRN    promethazine (PHENERGAN) tablet 12.5 mg  12.5 mg Oral Q6H PRN    Or    ondansetron (ZOFRAN) injection 4 mg  4 mg IntraVENous Q6H PRN    enoxaparin (LOVENOX) injection 40 mg  40 mg SubCUTAneous DAILY    [START ON 3/18/2021] 0.9% sodium chloride infusion  75 mL/hr IntraVENous CONTINUOUS    [START ON 3/18/2021] aspirin chewable tablet 81 mg  81 mg Oral DAILY    amLODIPine (NORVASC) tablet 7.5 mg  7.5 mg Oral DAILY    metoprolol succinate (TOPROL-XL) XL tablet 50 mg  50 mg Oral DAILY    rosuvastatin (CRESTOR) tablet 40 mg  40 mg Oral QHS    nitroglycerin (NITROBID) 2 % ointment 1 Inch  1 Inch Topical Q6H     Current Outpatient Medications   Medication Sig    rosuvastatin (Crestor) 40 mg tablet Take 1 Tab by mouth nightly.  meloxicam (MOBIC) 15 mg tablet TAKE 1 TABLET BY MOUTH EVERY DAY AS NEEDED FOR ARTHRITIS ORALLY 30 DAY(S)    isosorbide mononitrate ER (IMDUR) 30 mg tablet TAKE 1 TABLET BY MOUTH EVERY DAY TO PREVENT CHEST PAIN, CALL MD IF SIGNIFICANT HEADACHE    metoprolol succinate (TOPROL-XL) 50 mg XL tablet TAKE 1 TABLET BY MOUTH EVERY DAY    nitroglycerin (NITROSTAT) 0.4 mg SL tablet 1 Tab by SubLINGual route every five (5) minutes as needed for Chest Pain. Up to 3 doses.  aspirin 81 mg chewable tablet Take 1 Tab by mouth daily.  amLODIPine (NORVASC) 5 mg tablet Take 5 mg by mouth daily.  Pt taking 1 and 1/2 tab daily        Review of Symptoms:   11 systems reviewed, negative other than as stated in the HPI        Objective:      Visit Vitals  BP (!) 166/88   Pulse 82   Temp 97.6 °F (36.4 °C)   Resp 18   Ht 5' 3\" (1.6 m)   Wt 67.1 kg (147 lb 14.9 oz)   SpO2 97%   BMI 26.20 kg/m²       Physical:   General: WNWA elderly  female in NAD sitting up on stretcher  Heart: RRR, no m/S3/JVD, no carotid bruits   Lungs: clear throughout   Abdomen: Soft, +BS, NTND   Extremities: LE james +DP/PT, no edema   Neurologic: Grossly normal, mildly anxious, excellent historian     Data Review:   Recent Labs     03/17/21  1044   WBC 6.2   HGB 13.1   HCT 38.8        Recent Labs     03/17/21  1119      K 3.9      CO2 27   *   BUN 16   CREA 0.53*   CA 8.8   ALB 4.0   TBILI 0.5   ALT 47       Recent Labs     03/17/21  1119   TROIQ <0.05       No intake or output data in the 24 hours ending 03/17/21 1423     Cardiographics    Telemetry: NSR   ECG: NSR, no acute changes   Echocardiogram: pending   CXRAY: \"No acute abnormality\"        Assessment: Principal Problem:    Unstable angina ([facility] [address] (4/26/2017)    Active Problems:    CAD (coronary artery disease) (7/21/2020)      HTN (hypertension) (3/17/2021)      HLD (hyperlipidemia) (3/17/2021)         Plan:   Kota Madsen is a 63 y.o. female with a PMH significant for ASHD, HTN, HLD, venous insufficieny of bilateral lower extremities admitted for Unstable angina ([facility] [address] [I20.0]. Cardiology consulted for unstable angina. Initial troponin negative, no acute EKG changes, K 3.9, Cr 0.53, LDL 90, H/H 13.1/38.8, plt 264, WBC 6.2. Received [employer] Covid Vaccine last week. Unstable angina/Hx ASHD with stents:   Please admit to hospitalist service   Continue to monitor telemetry  S/p PCI/DARRELL for ISR to prox LAD 10/19 performed by DR Rankin  Negative NST in 10/2020; 12/19  Continue ASA  Just had Brilinta DC'd on Monday per Cardiology as is protocol, cardiac cath was 10/2019, brilinta onboard 16 months   Continue BB, CCB, Imdur, statin  PRN SL nitro  O2 as needed  Trend cardiac enzymes, initial negative  EKG reviewed, no acute changes  NPO after midnight  IVF for JULIANA protocol   Plan for cardiac catheterization 0730 tomorrow. Last cath from 2019 reviewed. I discussed the risks/benefits/alternatives of cardiac catheterization +/- PCI with the patient. Risks include (but are not limited to) bleeding, infection, cva/mi/tamponade/death. The patient understands and wishes to proceed. HTN: elevated   Resume norvasc,Toprol XL, Imdur    HLD:  Continue statin     Chronic Venous Insufficiency:   Follows with Dr. Hawa Anderson  Patient reports she wears compression stockings  Plan is for RF ablation of bilateral GSV     Thank you for consulting RCA. Will follow.      Cherelle Manzano MD

## 2021-03-17 NOTE — ROUTINE PROCESS
TRANSFER - IN REPORT: 
 
Verbal report received from [address] (name) on Elvie Osgood  being received from ER (unit) for routine progression of care Report consisted of patients Situation, Background, Assessment and  
Recommendations(SBAR). Information from the following report(s) SBAR, ED Summary and Recent Results was reviewed with the receiving nurse. Opportunity for questions and clarification was provided. Assessment completed upon patients arrival to unit and care assumed.

## 2021-03-18 VITALS
HEIGHT: 63 IN | HEART RATE: 72 BPM | BODY MASS INDEX: 25.69 KG/M2 | OXYGEN SATURATION: 96 % | SYSTOLIC BLOOD PRESSURE: 117 MMHG | DIASTOLIC BLOOD PRESSURE: 59 MMHG | WEIGHT: 145 LBS | RESPIRATION RATE: 18 BRPM | TEMPERATURE: 97.4 F

## 2021-03-18 PROBLEM — Z98.890 S/P CARDIAC CATH: Status: ACTIVE | Noted: 2021-03-18

## 2021-03-18 LAB
D DIMER PPP FEU-MCNC: <0.19 MG/L FEU (ref 0–0.65)
TROPONIN I SERPL-MCNC: 0.33 NG/ML

## 2021-03-18 PROCEDURE — 99218 HC RM OBSERVATION: CPT

## 2021-03-18 PROCEDURE — 74011000636 HC RX REV CODE- 636: Performed by: INTERNAL MEDICINE

## 2021-03-18 PROCEDURE — 93571 IV DOP VEL&/PRESS C FLO 1ST: CPT | Performed by: INTERNAL MEDICINE

## 2021-03-18 PROCEDURE — 77030015766: Performed by: INTERNAL MEDICINE

## 2021-03-18 PROCEDURE — C1769 GUIDE WIRE: HCPCS | Performed by: INTERNAL MEDICINE

## 2021-03-18 PROCEDURE — 77030019569 HC BND COMPR RAD TERU -B: Performed by: INTERNAL MEDICINE

## 2021-03-18 PROCEDURE — 36415 COLL VENOUS BLD VENIPUNCTURE: CPT

## 2021-03-18 PROCEDURE — 74011000258 HC RX REV CODE- 258: Performed by: INTERNAL MEDICINE

## 2021-03-18 PROCEDURE — 77030028837 HC SYR ANGI PWR INJ COEU -A: Performed by: INTERNAL MEDICINE

## 2021-03-18 PROCEDURE — C1887 CATHETER, GUIDING: HCPCS | Performed by: INTERNAL MEDICINE

## 2021-03-18 PROCEDURE — 85379 FIBRIN DEGRADATION QUANT: CPT

## 2021-03-18 PROCEDURE — 77030004521 HC CATH ANGI DX COOK -B: Performed by: INTERNAL MEDICINE

## 2021-03-18 PROCEDURE — 74011250637 HC RX REV CODE- 250/637: Performed by: INTERNAL MEDICINE

## 2021-03-18 PROCEDURE — 99152 MOD SED SAME PHYS/QHP 5/>YRS: CPT | Performed by: INTERNAL MEDICINE

## 2021-03-18 PROCEDURE — 74011250636 HC RX REV CODE- 250/636: Performed by: HOSPITALIST

## 2021-03-18 PROCEDURE — 99226 PR SBSQ OBSERVATION CARE/DAY 35 MINUTES: CPT | Performed by: INTERNAL MEDICINE

## 2021-03-18 PROCEDURE — 74011250637 HC RX REV CODE- 250/637: Performed by: HOSPITALIST

## 2021-03-18 PROCEDURE — 93458 L HRT ARTERY/VENTRICLE ANGIO: CPT | Performed by: INTERNAL MEDICINE

## 2021-03-18 PROCEDURE — 74011250636 HC RX REV CODE- 250/636: Performed by: INTERNAL MEDICINE

## 2021-03-18 PROCEDURE — 74011000250 HC RX REV CODE- 250: Performed by: INTERNAL MEDICINE

## 2021-03-18 PROCEDURE — 84484 ASSAY OF TROPONIN QUANT: CPT

## 2021-03-18 PROCEDURE — C1894 INTRO/SHEATH, NON-LASER: HCPCS | Performed by: INTERNAL MEDICINE

## 2021-03-18 PROCEDURE — 77030019698 HC SYR ANGI MDLON MRTM -A: Performed by: INTERNAL MEDICINE

## 2021-03-18 PROCEDURE — 77030008543 HC TBNG MON PRSS MRTM -A: Performed by: INTERNAL MEDICINE

## 2021-03-18 PROCEDURE — 77030004549 HC CATH ANGI DX PRF MRTM -A: Performed by: INTERNAL MEDICINE

## 2021-03-18 PROCEDURE — 77030010221 HC SPLNT WR POS TELE -B: Performed by: INTERNAL MEDICINE

## 2021-03-18 PROCEDURE — 99153 MOD SED SAME PHYS/QHP EA: CPT | Performed by: INTERNAL MEDICINE

## 2021-03-18 RX ORDER — LIDOCAINE HYDROCHLORIDE 10 MG/ML
INJECTION, SOLUTION EPIDURAL; INFILTRATION; INTRACAUDAL; PERINEURAL AS NEEDED
Status: DISCONTINUED | OUTPATIENT
Start: 2021-03-18 | End: 2021-03-18 | Stop reason: HOSPADM

## 2021-03-18 RX ORDER — GUAIFENESIN 100 MG/5ML
LIQUID (ML) ORAL AS NEEDED
Status: DISCONTINUED | OUTPATIENT
Start: 2021-03-18 | End: 2021-03-18 | Stop reason: HOSPADM

## 2021-03-18 RX ORDER — HEPARIN SODIUM 200 [USP'U]/100ML
INJECTION, SOLUTION INTRAVENOUS
Status: COMPLETED | OUTPATIENT
Start: 2021-03-18 | End: 2021-03-18

## 2021-03-18 RX ORDER — VERAPAMIL HYDROCHLORIDE 2.5 MG/ML
INJECTION, SOLUTION INTRAVENOUS AS NEEDED
Status: DISCONTINUED | OUTPATIENT
Start: 2021-03-18 | End: 2021-03-18 | Stop reason: HOSPADM

## 2021-03-18 RX ORDER — HEPARIN SODIUM 1000 [USP'U]/ML
INJECTION, SOLUTION INTRAVENOUS; SUBCUTANEOUS AS NEEDED
Status: DISCONTINUED | OUTPATIENT
Start: 2021-03-18 | End: 2021-03-18 | Stop reason: HOSPADM

## 2021-03-18 RX ORDER — MIDAZOLAM HYDROCHLORIDE 1 MG/ML
INJECTION, SOLUTION INTRAMUSCULAR; INTRAVENOUS AS NEEDED
Status: DISCONTINUED | OUTPATIENT
Start: 2021-03-18 | End: 2021-03-18 | Stop reason: HOSPADM

## 2021-03-18 RX ORDER — FENTANYL CITRATE 50 UG/ML
INJECTION, SOLUTION INTRAMUSCULAR; INTRAVENOUS AS NEEDED
Status: DISCONTINUED | OUTPATIENT
Start: 2021-03-18 | End: 2021-03-18 | Stop reason: HOSPADM

## 2021-03-18 RX ADMIN — NITROGLYCERIN 1 INCH: 20 OINTMENT TOPICAL at 00:41

## 2021-03-18 RX ADMIN — METOPROLOL SUCCINATE 50 MG: 50 TABLET, EXTENDED RELEASE ORAL at 08:58

## 2021-03-18 RX ADMIN — NITROGLYCERIN 1 INCH: 20 OINTMENT TOPICAL at 06:27

## 2021-03-18 RX ADMIN — Medication 10 ML: at 06:27

## 2021-03-18 RX ADMIN — AMLODIPINE BESYLATE 7.5 MG: 5 TABLET ORAL at 08:57

## 2021-03-18 RX ADMIN — SODIUM CHLORIDE 75 ML/HR: 9 INJECTION, SOLUTION INTRAVENOUS at 00:37

## 2021-03-18 NOTE — PROGRESS NOTES
Spiritual Care Assessment/Progress Note  [facility]      NAME: Arlen Horvath      MRN: [id number]  AGE: 63 y.o. SEX: female  Worship Affiliation: Presbyterian   Language: English     3/18/2021     Total Time (in minutes): 9     Spiritual Assessment begun in [facility] INTRV[facility] CARDIO through conversation with:         [x]Patient        [] Family    [] Friend(s)        Reason for Consult: Advance medical directive consult     Spiritual beliefs: (Please include comment if needed)     [] Identifies with a amber tradition:         [] Supported by a amber community:            [] Claims no spiritual orientation:           [] Seeking spiritual identity:                [] Adheres to an individual form of spirituality:           [x] Not able to assess:                           Identified resources for coping:      [] Prayer                               [] Music                  [] Guided Imagery     [] Family/friends                 [] Pet visits     [] Devotional reading                         [x] Unknown     [] Other:                                              Interventions offered during this visit: (See comments for more details)    Patient Interventions: Advance medical directive consult           Plan of Care:     [] Support spiritual and/or cultural needs    [] Support AMD and/or advance care planning process      [] Support grieving process   [] Coordinate Rites and/or Rituals    [] Coordination with community clergy   [x] No spiritual needs identified at this time   [] Detailed Plan of Care below (See Comments)  [] Make referral to Music Therapy  [] Make referral to Pet Therapy     [] Make referral to Addiction services  [] Make referral to [facility]  [] Make referral to Spiritual Care Partner  [] No future visits requested        [] Follow up upon further referrals     Comments:  Responded to in-basket request for advance medical directive consult. No visitors present.   Patient is being discharged today  She requested a form to take home and review.     DARIO Christine, [facility], [address]    [facility] [address] Paging Service  [phone])

## 2021-03-18 NOTE — PROGRESS NOTES
08:10 - Rec'd patient from CCL. R radial TR band CDI, patent at 11mL. 10:30 - TR band off, 4x4 & tegaderm applied. Tolerated well.      11:20 - Up to chair. Steady gait. R radial remains CDI.      12:20 - Ambulated in hallway without complaint. 12:47 - Discharge instructions given to patient. Verbalizes understanding. PIV and tele removed.

## 2021-03-18 NOTE — ACP (ADVANCE CARE PLANNING)
Responded to in-basket request for advance medical directive consult. No visitors present. Patient is being discharged today  She requested a form to take home and review.     DARIO Lawson, [facility], [address] [place] [address] Avenue    [address] [place] [address] Road Paging Service  [phone]

## 2021-03-18 NOTE — CARDIO/PULMONARY
Cardiac Rehab Note: chart review/referral  
 
Pt is a 62 yo admitted with chest pain, s/p cardiac cath with angiographically normal results. EF 67%  on nuclear stress test 10/28/20. Smoking history assessed. Patient is a non- smoker. Smoking Cessation Program link has not been added to the AVS. Patient not seen at this time due to current condition as indicated in chart.

## 2021-03-18 NOTE — PROGRESS NOTES
Transition of Care Plan:    RUR:n/a  Disposition:home w/ follow up appts. Follow up appointments: PCP, cardiology  DME needed:none  Transportation at [address] or means to access home:   yes     IM Medicare letter:n/a  Caregiver Contact: Lexy Marks [phone]  Discharge Caregiver contacted prior to discharge? At d/c    Reason for Admission:  Unstable Angina                     RUR Score:    n/a                 Plan for utilizing home health:  tbd        PCP: First and Last name:  Cordell Sumner MD     Name of Practice:    Are you a current patient: Yes/No: yes   Approximate date of last visit: 3 wks. ago   Can you participate in a virtual visit with your PCP: prefers in office                    Current Advanced Directive/Advance Care Plan: Full Code      Rosalinda 13 (ACP) Conversation      Date of Conversation: 3/18/21  Conducted with: Patient with Lisa 153:     Click here to complete [address] including [address] [facility] Relationship (ie \"Primary\")      Content/Action Overview:   Has NO ACP documents/care preferences - refer to ACP Clinical Specialist  Reviewed DNR/DNI and patient elects Full Code (Attempt Resuscitation)    Length of Voluntary ACP Conversation in minutes:  <16 minutes (Non-Billable)    Mayme Flash                              Transition of Care Plan:   Home with follow up appts. CM met with pt and her  at bedside to introduce role, verify demographics, insurance and PCP. CM also discussed d/c plan. Pt is a 64 yo, , , female who is currently under observation at [facility] for her dx of Unstable Angina. Pt sees her PCP every year and last saw him 3 wks ago. Pt prefers in office visits. Pt uses [facility] in [place]. Pt lives with her  in a two story; however, they reside on the first floor. There are 3 ELA.   Pt has two supportive grandchildren. Pt drives. Pt does not use any DME. Pt can complete her ADLs/IADLs independently. Pt does not have a hx of HH, SNF, or IPR. Pt's  will transport at d/c. Pt is insured through [employer] [facility]. CM reviewed OBS letter and placed copy on chart. CM will continue to follow and assess for her d/c needs. Care Management Interventions  PCP Verified by CM: Yes(Pt sees Dr. Keri Doll.)  Mode of Transport at Discharge:  Other (see comment)( will transport at d/c.)  Transition of Care Consult (CM Consult): Discharge Planning  Discharge Durable Medical Equipment: No(Pt does not use any DME.)  Physical Therapy Consult: No  Occupational Therapy Consult: No  Speech Therapy Consult: No  Current Support Network: Own Home, Lives with Spouse(Pt lives with her spouse in a two story with 3 ELA.)  Confirm Follow Up Transport: Self(Pt drives.)  Discharge Location  Discharge Placement: Home with outpatient services(Home with follow up appts.)    Jemma Curry,   Care Management, [address] [facility]

## 2021-03-18 NOTE — ROUTINE PROCESS
Report received from [place] , [address] [place] [address]. SBAR were discussed. This was an update patient has already gone to cath lab Emilia Bonilla RN

## 2021-03-18 NOTE — DISCHARGE INSTRUCTIONS
[address] [place] [address]  [phone]        Patient ID:  Lc Caldera  [id number]  7 y.o.  1955    Admit Date: 3/17/2021    Discharge Date: 3/18/2021     Admitting Physician: Florin Tadeo MD     Discharge Physician: Florin Tadeo MD    Admission Diagnoses:   Unstable angina [facility]) [I20.0]    Discharge Diagnoses:   Principal Problem:    Unstable angina ([address] (4/26/2017)    Active Problems:    CAD (coronary artery disease) (7/21/2020)      HTN (hypertension) (3/17/2021)      HLD (hyperlipidemia) (3/17/2021)      S/P cardiac cath (3/18/2021)      Overview: 3/18/2021: non-obstructive CAD, no intervention indicated at this time,       normal LVEP. Discharge Condition: Good    Cardiology Procedures this Admission:      Disposition: home    Reference discharge instructions provided by nursing for diet and activity. Signed:  Abhinav Ríos NP  3/18/2021  11:03 AM        Radial Cardiac Catheterization/Angiography Discharge Instructions    It is normal to feel tired the first couple days. Take it easy and follow the physicians instructions. CHECK THE CATHETER INSERTION SITE DAILY:    Remove the wrist dressing 24 hours after the procedure. You may shower 24 hours after the procedure. Wash with soap and water and pat dry. Gentle cleaning of the site with soap and water is sufficient, cover with a dry clean dressing or bandage. Do not apply creams or powders to the area. No soaking the wrist for 3 days. Leave the puncture site open to air after 24 hours post-procedure. CALL THE PHYSICIANS:     If the site becomes red, swollen or feels warm to the touch  If there is bleeding or drainage or if there is unusual pain at the radial site. If there is any minor oozing, you may apply a band-aid and remove after 12 hours.    If the bleeding continues, hold pressure with the middle finger against the puncture site and the thumb against the back of the wrist,call 911 to be transported to the hospital.  DO NOT DRIVE YOURSELF, OR HAVE ANYONE ELSE DRIVE YOU - CALL [phone]. ACTIVITY:   For the first 24 hours do not manipulate the wrist.  No lifting, pushing or pulling over 3-5 pounds with the affected wrist for 7 daysand no straining the insertion site. Do not life grocery bags or the garbage can, do not run the vacuum  or  for 7 days. Start with short walks as in the hospital and gradually increase as tolerated each day. It is recommended to walk 30 minutes 5-7 days per week. Follow your physicians instructions on activity. Avoid walking outside in extremes of heat or cold. Walk inside when it is cold and windy or hot and humid. Things to keep in mind:  No driving for at least 24 hours, or as designated by your physician. Limit the number of times you go up and down the stairs  Take rests and pace yourself with activity. Be careful and do not strain with bowel movements. MEDICATIONS:    Take all medications as prescribed  Call your physician if you have any questions  Keep an updated list of your medications with you at all times and give a list to your physician and pharmacist    SIGNS AND SYMPTOMS:   Be cautious of symptoms of angina or recurrent symptoms such as chest discomfort, unusual shortness of breath or fatigue. These could be symptoms of restenosis, a new blockage or a heart attack. If your symptoms are relieved with rest it is still recommended that you notify your physician of recurrent chest pain or discomfort. For CHEST PAIN or symptoms of angina not relieved with rest:  If the discomfort is not relieved with rest, and you have been prescribed Nitroglycerin, take as directed (taken under the tongue, one at a time 5 minutes apart for a total of 3 doses). If the discomfort is not relieved after the 3rd nitroglycerin, call 911.   If you have not been prescribed Nitroglycerin  and your chest discomfort is not relieved with rest, call 911. AFTER CARE:   Follow up with your physician as instructed. Follow a heart healthy diet with proper portion control, daily stress management, daily exercise, blood pressure and cholesterol control , and smoking cessation.

## 2021-03-18 NOTE — PROGRESS NOTES
Kota Madsen is recovering post-diagnostic [facility]. Right radial site dressing is CDI without swelling or bleeding. VSS. Rhythm NSR. Kota Madsen denies complaints at this time. If recovery continues to progress without complication, discharge is planned for later today. Please notify hospitalist. No changes to medications. Normal LVEF.      Court DARIN Lombardi  DNP, RN, AGACNP-BC

## 2021-03-18 NOTE — PROGRESS NOTES
3/18/2021 8:05 AM    Admit Date: 3/17/2021    Admit Diagnosis: Unstable angina ([facility] [I20.0]    Subjective:     Eugenio Ross   denies chest pain, chest pressure/discomfort, dyspnea, palpitations, irregular heart beats, near-syncope, syncope, fatigue, orthopnea, paroxysmal nocturnal dyspnea, exertional chest pressure/discomfort, claudication, lower extremity edema.     Visit Vitals  BP (!) 149/78   Pulse 78   Temp 98 °F (36.7 °C)   Resp 16   Ht 5' 3\" (1.6 m)   Wt 145 lb (65.8 kg)   LMP  (LMP Unknown)   SpO2 94%   BMI 25.69 kg/m²     Current Facility-Administered Medications   Medication Dose Route Frequency    aspirin chewable tablet    PRN    heparinized saline 2 units/mL infusion    CONTINUOUS    heparinized saline 2 units/mL infusion    CONTINUOUS    heparinized saline 2 units/mL infusion    CONTINUOUS    midazolam (VERSED) injection    PRN    fentaNYL citrate (PF) injection    PRN    lidocaine (PF) (XYLOCAINE) 10 mg/mL (1 %) injection    PRN    nitroglycerin 0.1 mg/mL in D5W injection    PRN    verapamiL (ISOPTIN) 2.5 mg/mL injection    PRN    heparin (porcine) 1,000 unit/mL injection    PRN    adenosine (ADENOSCAN) 90 mg in 0.9% sodium chloride 90 mL infusion    CONTINUOUS    iopamidoL (ISOVUE-370) 76 % injection    PRN    sodium chloride (NS) flush 5-40 mL  5-40 mL IntraVENous Q8H    sodium chloride (NS) flush 5-40 mL  5-40 mL IntraVENous PRN    acetaminophen (TYLENOL) tablet 650 mg  650 mg Oral Q6H PRN    Or    acetaminophen (TYLENOL) suppository 650 mg  650 mg Rectal Q6H PRN    polyethylene glycol (MIRALAX) packet 17 g  17 g Oral DAILY PRN    promethazine (PHENERGAN) tablet 12.5 mg  12.5 mg Oral Q6H PRN    Or    ondansetron (ZOFRAN) injection 4 mg  4 mg IntraVENous Q6H PRN    0.9% sodium chloride infusion  75 mL/hr IntraVENous CONTINUOUS    aspirin chewable tablet 81 mg  81 mg Oral DAILY    amLODIPine (NORVASC) tablet 7.5 mg  7.5 mg Oral DAILY    metoprolol succinate (TOPROL-XL) XL tablet 50 mg  50 mg Oral DAILY    rosuvastatin (CRESTOR) tablet 40 mg  40 mg Oral QHS    nitroglycerin (NITROBID) 2 % ointment 1 Inch  1 Inch Topical Q6H         Objective:      Visit Vitals  BP (!) 149/78   Pulse 78   Temp 98 °F (36.7 °C)   Resp 16   Ht 5' 3\" (1.6 m)   Wt 145 lb (65.8 kg)   SpO2 94%   BMI 25.69 kg/m²       Physical Exam:  Resting comfortably. No resp distress. Extremities: extremities normal, atraumatic, no cyanosis or edema  Heart: regular rate and rhythm, S1, S2 normal, no murmur, click, rub or gallop  Lungs: clear to auscultation bilaterally  Neurologic: Grossly normal    Data Review:   Labs:    Recent Results (from the past 24 hour(s))   EKG, 12 LEAD, INITIAL    Collection Time: 03/17/21 10:40 AM   Result Value Ref Range    Ventricular Rate 81 BPM    Atrial Rate 81 BPM    P-R Interval 150 ms    QRS Duration 90 ms    Q-T Interval 384 ms    QTC Calculation (Bezet) 446 ms    Calculated P Axis 29 degrees    Calculated R Axis 24 degrees    Calculated T Axis 29 degrees    Diagnosis       Normal sinus rhythm  Normal ECG  When compared with ECG of 29-OCT-2019 06:07,  No significant change was found  Confirmed by Chucho Montalvo ([id number]) on 3/17/2021 8:44:43 PM     CBC WITH AUTOMATED DIFF    Collection Time: 03/17/21 10:44 AM   Result Value Ref Range    WBC 6.2 3.6 - 11.0 K/uL    RBC 4.25 3.80 - 5.20 M/uL    HGB 13.1 11.5 - 16.0 g/dL    HCT 38.8 35.0 - 47.0 %    MCV 91.3 80.0 - 99.0 FL    MCH 30.8 26.0 - 34.0 PG    MCHC 33.8 30.0 - 36.5 g/dL    RDW 13.0 11.5 - 14.5 %    PLATELET 537 242 - 373 K/uL    MPV 10.7 8.9 - 12.9 FL    NRBC 0.0 0  WBC    ABSOLUTE NRBC 0.00 0.00 - 0.01 K/uL    NEUTROPHILS 55 32 - 75 %    LYMPHOCYTES 36 12 - 49 %    MONOCYTES 8 5 - 13 %    EOSINOPHILS 0 0 - 7 %    BASOPHILS 1 0 - 1 %    IMMATURE GRANULOCYTES 0 0.0 - 0.5 %    ABS. NEUTROPHILS 3.4 1.8 - 8.0 K/UL    ABS. LYMPHOCYTES 2.3 0.8 - 3.5 K/UL    ABS. MONOCYTES 0.5 0.0 - 1.0 K/UL    ABS.  EOSINOPHILS 0.0 0.0 - 0.4 K/UL    ABS. BASOPHILS 0.0 0.0 - 0.1 K/UL    ABS. IMM. GRANS. 0.0 0.00 - 0.04 K/UL    DF AUTOMATED     CK W/ REFLX CKMB    Collection Time: 03/17/21 11:19 AM   Result Value Ref Range    CK 88 26 - 192 U/L   TROPONIN I    Collection Time: 03/17/21 11:19 AM   Result Value Ref Range    Troponin-I, Qt. <0.05 <3.87 ng/mL   METABOLIC PANEL, COMPREHENSIVE    Collection Time: 03/17/21 11:19 AM   Result Value Ref Range    Sodium 138 136 - 145 mmol/L    Potassium 3.9 3.5 - 5.1 mmol/L    Chloride 108 97 - 108 mmol/L    CO2 27 21 - 32 mmol/L    Anion gap 3 (L) 5 - 15 mmol/L    Glucose 124 (H) 65 - 100 mg/dL    BUN 16 6 - 20 MG/DL    Creatinine 0.53 (L) 0.55 - 1.02 MG/DL    BUN/Creatinine ratio 30 (H) 12 - 20      GFR est AA >60 >60 ml/min/1.73m2    GFR est non-AA >60 >60 ml/min/1.73m2    Calcium 8.8 8.5 - 10.1 MG/DL    Bilirubin, total 0.5 0.2 - 1.0 MG/DL    ALT (SGPT) 47 12 - 78 U/L    AST (SGOT) 16 15 - 37 U/L    Alk. phosphatase 91 45 - 117 U/L    Protein, total 7.5 6.4 - 8.2 g/dL    Albumin 4.0 3.5 - 5.0 g/dL    Globulin 3.5 2.0 - 4.0 g/dL    A-G Ratio 1.1 1.1 - 2.2     TROPONIN I    Collection Time: 03/17/21  7:33 PM   Result Value Ref Range    Troponin-I, Qt. 0.28 (H) <0.05 ng/mL   TROPONIN I    Collection Time: 03/18/21  2:28 AM   Result Value Ref Range    Troponin-I, Qt. 0.33 (H) <0.05 ng/mL       Telemetry: SR      Assessment:     Principal Problem:    Unstable angina ([address] (4/26/2017)    Active Problems:    CAD (coronary artery disease) (7/21/2020)      HTN (hypertension) (3/17/2021)      HLD (hyperlipidemia) (3/17/2021)        Plan:     Unstable angina/Hx ASHD with stents:   No significant CAD noted mild ISR of ostial LAD stent. Normal FFR. No further cardiac work up. Continue current meds.      HTN:    Continue current meds.      HLD:  Continue statin      Chronic Venous Insufficiency:   Will f/u as out pt.

## 2021-03-19 NOTE — DISCHARGE SUMMARY
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 Pain similar to when needed stents in past.  Last stent placed 10/19 to LAD. Pain lasted for 3.5 hours total and resolved with 3rd SL nitro in ER.     She typically has random CP episodes 1-2x/month usually relieved with 2 sublingual nitro. No chest pain in past 2 months. Had J&J vaccine on 3/11/21. Saw Dr. Lenka Morgan 3/15 by virtual visit and told to stop Brilinta, lipitor changed to crestor since LDL not at goal.     ______________________________________________________________________  DISCHARGE SUMMARY/HOSPITAL COURSE:  for full details see H&P, daily progress notes, labs, consult notes. Chest pain  CAD hx 2 stents last in 10/19 to LAD   -Patient was admitted to hospital and cardiology consultation was placed for further evaluation. Patient underwent a complete work-up for chest pain. D-dimer was normal.  Chest x-ray showed no acute process. Patient underwent a coronary angiogram and was noted to have no significant coronary artery disease and recommended to continue medications as below and have outpatient follow-up in the next 1 to 2 weeks. Patient was cleared by cardiology to be discharged.       HTN  --continue toprol XL, amlodipine     Hyperlipidemia  --LDL not at goal at 90 this month and was just changed from lipitor to crestor 2 days ago by Dr. Lenka Morgan.   To have repeat lipid panel in 3 months     Venous insufficiency in legs  --planned to have ablation of b/l greater saphenous vein by Dr. Danae Samuels in future           _______________________________________________________________________  Patient seen and examined by me on discharge day. Pertinent Findings:  Gen:    Not in distress  Chest: Clear lungs  CVS:   Regular rhythm. No edema  Abd:  Soft, not distended, not tender  Neuro:  Alert, awake  _______________________________________________________________________  DISCHARGE MEDICATIONS:   Discharge Medication List as of 3/18/2021 12:30 PM      CONTINUE these medications which have NOT CHANGED    Details   aspirin 81 mg chewable tablet Take 1 Tab by mouth daily. , Print, Disp-30 Tab, R-6      amLODIPine (NORVASC) 5 mg tablet Take 5 mg by mouth daily. Pt taking 1.5 tablets aday, Historical Med      rosuvastatin (Crestor) 40 mg tablet Take 1 Tab by mouth nightly., Normal, Disp-30 Tab, R-3      meloxicam (MOBIC) 15 mg tablet TAKE 1 TABLET BY MOUTH EVERY DAY AS NEEDED FOR ARTHRITIS ORALLY 30 DAY(S), Historical Med      isosorbide mononitrate ER (IMDUR) 30 mg tablet TAKE 1 TABLET BY MOUTH EVERY DAY TO PREVENT CHEST PAIN, CALL MD IF SIGNIFICANT HEADACHE, Normal, Disp-90 Tab, R-3      metoprolol succinate (TOPROL-XL) 50 mg XL tablet TAKE 1 TABLET BY MOUTH EVERY DAY, Historical Med      nitroglycerin (NITROSTAT) 0.4 mg SL tablet 1 Tab by SubLINGual route every five (5) minutes as needed for Chest Pain. Up to 3 doses. , Print, Disp-1 Bottle, R-5               Patient Follow Up Instructions:        Activity: Activity as tolerated  Diet: Cardiac Diet  Wound Care: None needed    *      Follow-up Information     Follow up With Specialties Details Why Contact Info    Job Membreno MD [facility]   [address] [place] [phone]  [phone]        Schedule an appointment as soon as possible for a visit in 1 week      Mayford Shone, MD Cardiology, [address] [place] [address] Vascular Surgery, Internal Medicine Schedule an appointment as soon as possible for a visit in 2 weeks  [address]  [phone]          ________________________________________________________________    Risk of deterioration: High    Condition at Discharge:  Stable  __________________________________________________________________    Disposition  Home with family, no needs    ____________________________________________________________________    Code Status: Full Code  ___________________________________________________________________      Total time in minutes spent coordinating this discharge (includes going over instructions, follow-up, prescriptions, and preparing report for sign off to her PCP) :  35  minutes    Signed:  Tatum Briceño MD

## 2021-03-22 ENCOUNTER — APPOINTMENT (OUTPATIENT)
Dept: CT IMAGING | Age: 66
End: 2021-03-22
Attending: STUDENT IN AN ORGANIZED HEALTH CARE EDUCATION/TRAINING PROGRAM
Payer: COMMERCIAL

## 2021-03-22 ENCOUNTER — HOSPITAL ENCOUNTER (OUTPATIENT)
Age: 66
Setting detail: OBSERVATION
Discharge: HOME | End: 2021-03-23
Attending: STUDENT IN AN ORGANIZED HEALTH CARE EDUCATION/TRAINING PROGRAM | Admitting: STUDENT IN AN ORGANIZED HEALTH CARE EDUCATION/TRAINING PROGRAM
Payer: COMMERCIAL

## 2021-03-22 ENCOUNTER — TELEPHONE (OUTPATIENT)
Dept: CARDIOLOGY CLINIC | Age: 66
End: 2021-03-22

## 2021-03-22 DIAGNOSIS — Z98.61 S/P PTCA (PERCUTANEOUS TRANSLUMINAL CORONARY ANGIOPLASTY): ICD-10-CM

## 2021-03-22 DIAGNOSIS — I25.110 CORONARY ARTERY DISEASE INVOLVING NATIVE CORONARY ARTERY OF NATIVE HEART WITH UNSTABLE ANGINA PECTORIS (HCC): ICD-10-CM

## 2021-03-22 DIAGNOSIS — I10 ESSENTIAL HYPERTENSION: ICD-10-CM

## 2021-03-22 DIAGNOSIS — I24.9 ACUTE CORONARY SYNDROME (HCC): ICD-10-CM

## 2021-03-22 DIAGNOSIS — I21.4 NSTEMI (NON-ST ELEVATED MYOCARDIAL INFARCTION) (HCC): Primary | ICD-10-CM

## 2021-03-22 DIAGNOSIS — E78.2 MIXED HYPERLIPIDEMIA: ICD-10-CM

## 2021-03-22 DIAGNOSIS — M48.02 CERVICAL STENOSIS OF SPINE: ICD-10-CM

## 2021-03-22 DIAGNOSIS — R07.89 ATYPICAL CHEST PAIN: ICD-10-CM

## 2021-03-22 DIAGNOSIS — I20.0 UNSTABLE ANGINA (HCC): ICD-10-CM

## 2021-03-22 PROBLEM — R07.9 CHEST PAIN: Status: ACTIVE | Noted: 2021-03-22

## 2021-03-22 LAB
ALBUMIN SERPL-MCNC: 4.2 G/DL (ref 3.5–5)
ALBUMIN/GLOB SERPL: 1.2 {RATIO} (ref 1.1–2.2)
ALP SERPL-CCNC: 96 U/L (ref 45–117)
ALT SERPL-CCNC: 59 U/L (ref 12–78)
ANION GAP SERPL CALC-SCNC: 4 MMOL/L (ref 5–15)
AST SERPL-CCNC: 34 U/L (ref 15–37)
BASOPHILS # BLD: 0.1 K/UL (ref 0–0.1)
BASOPHILS NFR BLD: 1 % (ref 0–1)
BILIRUB SERPL-MCNC: 0.4 MG/DL (ref 0.2–1)
BUN SERPL-MCNC: 18 MG/DL (ref 6–20)
BUN/CREAT SERPL: 30 (ref 12–20)
CALCIUM SERPL-MCNC: 9.1 MG/DL (ref 8.5–10.1)
CHLORIDE SERPL-SCNC: 104 MMOL/L (ref 97–108)
CO2 SERPL-SCNC: 27 MMOL/L (ref 21–32)
CREAT SERPL-MCNC: 0.6 MG/DL (ref 0.55–1.02)
DIFFERENTIAL METHOD BLD: NORMAL
EOSINOPHIL # BLD: 0 K/UL (ref 0–0.4)
EOSINOPHIL NFR BLD: 0 % (ref 0–7)
ERYTHROCYTE [DISTWIDTH] IN BLOOD BY AUTOMATED COUNT: 12.6 % (ref 11.5–14.5)
GLOBULIN SER CALC-MCNC: 3.6 G/DL (ref 2–4)
GLUCOSE SERPL-MCNC: 133 MG/DL (ref 65–100)
HCT VFR BLD AUTO: 36.2 % (ref 35–47)
HGB BLD-MCNC: 12.2 G/DL (ref 11.5–16)
IMM GRANULOCYTES # BLD AUTO: 0 K/UL (ref 0–0.04)
IMM GRANULOCYTES NFR BLD AUTO: 0 % (ref 0–0.5)
LYMPHOCYTES # BLD: 2.2 K/UL (ref 0.8–3.5)
LYMPHOCYTES NFR BLD: 31 % (ref 12–49)
MCH RBC QN AUTO: 30.7 PG (ref 26–34)
MCHC RBC AUTO-ENTMCNC: 33.7 G/DL (ref 30–36.5)
MCV RBC AUTO: 91 FL (ref 80–99)
MONOCYTES # BLD: 0.5 K/UL (ref 0–1)
MONOCYTES NFR BLD: 8 % (ref 5–13)
NEUTS SEG # BLD: 4.3 K/UL (ref 1.8–8)
NEUTS SEG NFR BLD: 60 % (ref 32–75)
NRBC # BLD: 0 K/UL (ref 0–0.01)
NRBC BLD-RTO: 0 PER 100 WBC
PLATELET # BLD AUTO: 214 K/UL (ref 150–400)
PMV BLD AUTO: 10 FL (ref 8.9–12.9)
POTASSIUM SERPL-SCNC: 4.3 MMOL/L (ref 3.5–5.1)
PROT SERPL-MCNC: 7.8 G/DL (ref 6.4–8.2)
RBC # BLD AUTO: 3.98 M/UL (ref 3.8–5.2)
SODIUM SERPL-SCNC: 135 MMOL/L (ref 136–145)
TROPONIN I SERPL-MCNC: 0.15 NG/ML
TROPONIN I SERPL-MCNC: 0.5 NG/ML
TROPONIN I SERPL-MCNC: 0.88 NG/ML
WBC # BLD AUTO: 7.1 K/UL (ref 3.6–11)

## 2021-03-22 PROCEDURE — 93005 ELECTROCARDIOGRAM TRACING: CPT

## 2021-03-22 PROCEDURE — 65660000001 HC RM ICU INTERMED STEPDOWN

## 2021-03-22 PROCEDURE — 74011000636 HC RX REV CODE- 636: Performed by: STUDENT IN AN ORGANIZED HEALTH CARE EDUCATION/TRAINING PROGRAM

## 2021-03-22 PROCEDURE — 99223 1ST HOSP IP/OBS HIGH 75: CPT | Performed by: INTERNAL MEDICINE

## 2021-03-22 PROCEDURE — 71275 CT ANGIOGRAPHY CHEST: CPT

## 2021-03-22 PROCEDURE — 74011250636 HC RX REV CODE- 250/636: Performed by: EMERGENCY MEDICINE

## 2021-03-22 PROCEDURE — 74011250637 HC RX REV CODE- 250/637: Performed by: EMERGENCY MEDICINE

## 2021-03-22 PROCEDURE — 36415 COLL VENOUS BLD VENIPUNCTURE: CPT

## 2021-03-22 PROCEDURE — 99285 EMERGENCY DEPT VISIT HI MDM: CPT

## 2021-03-22 PROCEDURE — 99218 HC RM OBSERVATION: CPT

## 2021-03-22 PROCEDURE — 74011250637 HC RX REV CODE- 250/637: Performed by: NURSE PRACTITIONER

## 2021-03-22 PROCEDURE — 96372 THER/PROPH/DIAG INJ SC/IM: CPT

## 2021-03-22 PROCEDURE — 80053 COMPREHEN METABOLIC PANEL: CPT

## 2021-03-22 PROCEDURE — 74011000250 HC RX REV CODE- 250: Performed by: NURSE PRACTITIONER

## 2021-03-22 PROCEDURE — 72125 CT NECK SPINE W/O DYE: CPT

## 2021-03-22 PROCEDURE — 84484 ASSAY OF TROPONIN QUANT: CPT

## 2021-03-22 PROCEDURE — 85025 COMPLETE CBC W/AUTO DIFF WBC: CPT

## 2021-03-22 PROCEDURE — 74011250637 HC RX REV CODE- 250/637: Performed by: STUDENT IN AN ORGANIZED HEALTH CARE EDUCATION/TRAINING PROGRAM

## 2021-03-22 RX ORDER — SODIUM CHLORIDE 0.9 % (FLUSH) 0.9 %
5-40 SYRINGE (ML) INJECTION EVERY 8 HOURS
Status: DISCONTINUED | OUTPATIENT
Start: 2021-03-22 | End: 2021-03-23 | Stop reason: HOSPADM

## 2021-03-22 RX ORDER — PROMETHAZINE HYDROCHLORIDE 25 MG/1
12.5 TABLET ORAL
Status: DISCONTINUED | OUTPATIENT
Start: 2021-03-22 | End: 2021-03-23 | Stop reason: HOSPADM

## 2021-03-22 RX ORDER — ISOSORBIDE MONONITRATE 30 MG/1
30 TABLET, EXTENDED RELEASE ORAL DAILY
Status: DISCONTINUED | OUTPATIENT
Start: 2021-03-23 | End: 2021-03-23 | Stop reason: HOSPADM

## 2021-03-22 RX ORDER — GUAIFENESIN 100 MG/5ML
81 LIQUID (ML) ORAL DAILY
Status: DISCONTINUED | OUTPATIENT
Start: 2021-03-23 | End: 2021-03-23 | Stop reason: HOSPADM

## 2021-03-22 RX ORDER — NITROGLYCERIN 0.4 MG/1
0.4 TABLET SUBLINGUAL
Status: DISCONTINUED | OUTPATIENT
Start: 2021-03-22 | End: 2021-03-23 | Stop reason: HOSPADM

## 2021-03-22 RX ORDER — POLYETHYLENE GLYCOL 3350 17 G/17G
17 POWDER, FOR SOLUTION ORAL DAILY PRN
Status: DISCONTINUED | OUTPATIENT
Start: 2021-03-22 | End: 2021-03-23 | Stop reason: HOSPADM

## 2021-03-22 RX ORDER — METOPROLOL SUCCINATE 50 MG/1
50 TABLET, EXTENDED RELEASE ORAL DAILY
Status: DISCONTINUED | OUTPATIENT
Start: 2021-03-23 | End: 2021-03-23 | Stop reason: HOSPADM

## 2021-03-22 RX ORDER — CLOPIDOGREL BISULFATE 75 MG/1
75 TABLET ORAL DAILY
Qty: 18 TAB | Refills: 0 | Status: SHIPPED | OUTPATIENT
Start: 2021-03-22 | End: 2021-03-23

## 2021-03-22 RX ORDER — SODIUM CHLORIDE 0.9 % (FLUSH) 0.9 %
5-40 SYRINGE (ML) INJECTION AS NEEDED
Status: DISCONTINUED | OUTPATIENT
Start: 2021-03-22 | End: 2021-03-23 | Stop reason: HOSPADM

## 2021-03-22 RX ORDER — ENOXAPARIN SODIUM 100 MG/ML
1 INJECTION SUBCUTANEOUS ONCE
Status: COMPLETED | OUTPATIENT
Start: 2021-03-22 | End: 2021-03-22

## 2021-03-22 RX ORDER — ROSUVASTATIN CALCIUM 40 MG/1
40 TABLET, COATED ORAL
Status: DISCONTINUED | OUTPATIENT
Start: 2021-03-22 | End: 2021-03-23 | Stop reason: HOSPADM

## 2021-03-22 RX ORDER — CLOPIDOGREL BISULFATE 75 MG/1
300 TABLET ORAL ONCE
Status: COMPLETED | OUTPATIENT
Start: 2021-03-22 | End: 2021-03-22

## 2021-03-22 RX ORDER — ACETAMINOPHEN 325 MG/1
650 TABLET ORAL
Status: DISCONTINUED | OUTPATIENT
Start: 2021-03-22 | End: 2021-03-23 | Stop reason: HOSPADM

## 2021-03-22 RX ORDER — NITROGLYCERIN 0.4 MG/1
0.4 TABLET SUBLINGUAL
Status: COMPLETED | OUTPATIENT
Start: 2021-03-22 | End: 2021-03-22

## 2021-03-22 RX ORDER — AMLODIPINE BESYLATE 5 MG/1
7.5 TABLET ORAL DAILY
Status: DISCONTINUED | OUTPATIENT
Start: 2021-03-23 | End: 2021-03-23 | Stop reason: HOSPADM

## 2021-03-22 RX ORDER — ONDANSETRON 2 MG/ML
4 INJECTION INTRAMUSCULAR; INTRAVENOUS
Status: DISCONTINUED | OUTPATIENT
Start: 2021-03-22 | End: 2021-03-23 | Stop reason: HOSPADM

## 2021-03-22 RX ORDER — ACETAMINOPHEN 650 MG/1
650 SUPPOSITORY RECTAL
Status: DISCONTINUED | OUTPATIENT
Start: 2021-03-22 | End: 2021-03-23 | Stop reason: HOSPADM

## 2021-03-22 RX ADMIN — CLOPIDOGREL BISULFATE 300 MG: 75 TABLET ORAL at 17:14

## 2021-03-22 RX ADMIN — NITROGLYCERIN 1 INCH: 20 OINTMENT TOPICAL at 18:20

## 2021-03-22 RX ADMIN — NITROGLYCERIN 0.4 MG: 0.4 TABLET, ORALLY DISINTEGRATING SUBLINGUAL at 11:48

## 2021-03-22 RX ADMIN — Medication 10 ML: at 22:35

## 2021-03-22 RX ADMIN — IOPAMIDOL 100 ML: 755 INJECTION, SOLUTION INTRAVENOUS at 13:32

## 2021-03-22 RX ADMIN — LIDOCAINE HYDROCHLORIDE 40 ML: 20 SOLUTION ORAL; TOPICAL at 12:31

## 2021-03-22 RX ADMIN — ACETAMINOPHEN 650 MG: 325 TABLET, FILM COATED ORAL at 22:34

## 2021-03-22 RX ADMIN — ENOXAPARIN SODIUM 70 MG: 80 INJECTION SUBCUTANEOUS at 17:14

## 2021-03-22 RX ADMIN — ROSUVASTATIN CALCIUM 40 MG: 40 TABLET, FILM COATED ORAL at 22:34

## 2021-03-22 NOTE — TELEPHONE ENCOUNTER
Please call patient she is having pain and just had cardiac cath and they found no blockages, patient on the way to ER.          Thanks  Jamie Kenny

## 2021-03-22 NOTE — ED NOTES
Pt arrived to ED from home w/  at the bedside c/o chest pain since 0630 this morning. Pt reports having taken 2 nitros this morning with pain relief after taking the second pill. Pt is AOX4 and monitored X3. Pt reports a hx of HTN and stent placement. Pt reports having had a cardiac cath last Thursday that didn't show any blockages.

## 2021-03-22 NOTE — ED PROVIDER NOTES
EMERGENCY DEPARTMENT HISTORY AND PHYSICAL EXAM      Date: 3/22/2021  Patient Name: Adriana Otto    History of Presenting Illness     Chief Complaint   Patient presents with    Chest Pain     woke up with severe pain that has now relieved to 5/10 radiating down left arm. Pt had cardiac cath on thurs. Pt referred by Dr Eden Avendaño. History Provided By: Patient    HPI: Adriana Otto, 63 y.o. female with past medical history of CAD, hypertension, hyperlipidemia, venous insufficiency of lower extremities presents to the ED with cc of chest pain. Patient reports that she woke up this morning with severe sharp substernal chest pain that radiates to her left arm. Reports that she took 2 tabs of nitro at home. States that the first tab did not help with the chest pain, but the second half of nitro helped a little. States that her chest pain is currently 3 out of 10 in intensity. States that she \" broke out into a sweat\" at the onset of her symptoms. Denies any nausea, shortness of breath, palpitations, lightheadedness/dizziness/syncope. Of note, patient was recently admitted to the hospital for chest pain with elevated troponin, subsequently underwent left heart catheterization procedure with Dr. Rasheed Meza, which showed minimal disease not requiring intervention. States that she was recently taken off of all her antiplatelet medications. PCP: Ed Franklin MD    No current facility-administered medications on file prior to encounter. Current Outpatient Medications on File Prior to Encounter   Medication Sig Dispense Refill    rosuvastatin (Crestor) 40 mg tablet Take 1 Tab by mouth nightly.  30 Tab 3    meloxicam (MOBIC) 15 mg tablet TAKE 1 TABLET BY MOUTH EVERY DAY AS NEEDED FOR ARTHRITIS ORALLY 30 DAY(S)      isosorbide mononitrate ER (IMDUR) 30 mg tablet TAKE 1 TABLET BY MOUTH EVERY DAY TO PREVENT CHEST PAIN, CALL MD IF SIGNIFICANT HEADACHE 90 Tab 3    metoprolol succinate (TOPROL-XL) 50 mg XL tablet TAKE 1 TABLET BY MOUTH EVERY DAY      nitroglycerin (NITROSTAT) 0.4 mg SL tablet 1 Tab by SubLINGual route every five (5) minutes as needed for Chest Pain. Up to 3 doses. 1 Bottle 5    aspirin 81 mg chewable tablet Take 1 Tab by mouth daily. 30 Tab 6    amLODIPine (NORVASC) 5 mg tablet Take 5 mg by mouth daily. Pt taking 1.5 tablets aday         Past History     Past Medical History:  Past Medical History:   Diagnosis Date    CAD (coronary artery disease)     Hypertension        Past Surgical History:  Past Surgical History:   Procedure Laterality Date    HX CORONARY STENT PLACEMENT  04/26/2017    HX GYN      vaginal wall repair    HX OTHER SURGICAL      surgery for fissure    PA BREAST SURGERY PROCEDURE UNLISTED      cystectomy x2       Family History:  Family History   Problem Relation Age of Onset    Heart Disease Mother     Heart Disease Father        Social History:  Social History     Tobacco Use    Smoking status: Never Smoker    Smokeless tobacco: Never Used   Substance Use Topics    Alcohol use: Yes     Frequency: 4 or more times a week     Comment: 5-6 beers a week    Drug use: No       Allergies: Allergies   Allergen Reactions    Codeine Nausea and Vomiting         Review of Systems   Review of Systems   Constitutional: Negative for chills and fever. HENT: Negative for congestion and rhinorrhea. Eyes: Negative for visual disturbance. Respiratory: Negative for chest tightness and shortness of breath. Cardiovascular: Positive for chest pain. Negative for palpitations. Gastrointestinal: Negative for abdominal pain, diarrhea, nausea and vomiting. Genitourinary: Negative for dysuria, flank pain and hematuria. Musculoskeletal: Negative for back pain and neck pain. Skin: Negative for rash. Allergic/Immunologic: Negative for immunocompromised state. Neurological: Negative for dizziness, speech difficulty, weakness and headaches.    Hematological: Negative for adenopathy. Psychiatric/Behavioral: Negative for dysphoric mood and suicidal ideas. Physical Exam   Physical Exam  Vitals signs and nursing note reviewed. Constitutional:       General: She is not in acute distress. Appearance: She is not ill-appearing or toxic-appearing. HENT:      Head: Normocephalic and atraumatic. Nose: Nose normal.      Mouth/Throat:      Mouth: Mucous membranes are moist.   Eyes:      Extraocular Movements: Extraocular movements intact. Pupils: Pupils are equal, round, and reactive to light. Neck:      Musculoskeletal: Normal range of motion and neck supple. Cardiovascular:      Rate and Rhythm: Normal rate and regular rhythm. Pulses: Normal pulses. Pulmonary:      Effort: Pulmonary effort is normal.      Breath sounds: No stridor. No wheezing or rhonchi. Abdominal:      General: Abdomen is flat. There is no distension. Tenderness: There is no abdominal tenderness. Musculoskeletal: Normal range of motion. Skin:     General: Skin is warm and dry. Neurological:      General: No focal deficit present. Mental Status: She is alert and oriented to person, place, and time. Psychiatric:         Judgment: Judgment normal.         Diagnostic Study Results     Labs -     Recent Results (from the past 24 hour(s))   CBC WITH AUTOMATED DIFF    Collection Time: 03/22/21 10:22 AM   Result Value Ref Range    WBC 7.1 3.6 - 11.0 K/uL    RBC 3.98 3.80 - 5.20 M/uL    HGB 12.2 11.5 - 16.0 g/dL    HCT 36.2 35.0 - 47.0 %    MCV 91.0 80.0 - 99.0 FL    MCH 30.7 26.0 - 34.0 PG    MCHC 33.7 30.0 - 36.5 g/dL    RDW 12.6 11.5 - 14.5 %    PLATELET 974 739 - 612 K/uL    MPV 10.0 8.9 - 12.9 FL    NRBC 0.0 0  WBC    ABSOLUTE NRBC 0.00 0.00 - 0.01 K/uL    NEUTROPHILS 60 32 - 75 %    LYMPHOCYTES 31 12 - 49 %    MONOCYTES 8 5 - 13 %    EOSINOPHILS 0 0 - 7 %    BASOPHILS 1 0 - 1 %    IMMATURE GRANULOCYTES 0 0.0 - 0.5 %    ABS. NEUTROPHILS 4.3 1.8 - 8.0 K/UL    ABS. LYMPHOCYTES 2.2 0.8 - 3.5 K/UL    ABS. MONOCYTES 0.5 0.0 - 1.0 K/UL    ABS. EOSINOPHILS 0.0 0.0 - 0.4 K/UL    ABS. BASOPHILS 0.1 0.0 - 0.1 K/UL    ABS. IMM. GRANS. 0.0 0.00 - 0.04 K/UL    DF AUTOMATED     METABOLIC PANEL, COMPREHENSIVE    Collection Time: 03/22/21 10:22 AM   Result Value Ref Range    Sodium 135 (L) 136 - 145 mmol/L    Potassium 4.3 3.5 - 5.1 mmol/L    Chloride 104 97 - 108 mmol/L    CO2 27 21 - 32 mmol/L    Anion gap 4 (L) 5 - 15 mmol/L    Glucose 133 (H) 65 - 100 mg/dL    BUN 18 6 - 20 MG/DL    Creatinine 0.60 0.55 - 1.02 MG/DL    BUN/Creatinine ratio 30 (H) 12 - 20      GFR est AA >60 >60 ml/min/1.73m2    GFR est non-AA >60 >60 ml/min/1.73m2    Calcium 9.1 8.5 - 10.1 MG/DL    Bilirubin, total 0.4 0.2 - 1.0 MG/DL    ALT (SGPT) 59 12 - 78 U/L    AST (SGOT) 34 15 - 37 U/L    Alk. phosphatase 96 45 - 117 U/L    Protein, total 7.8 6.4 - 8.2 g/dL    Albumin 4.2 3.5 - 5.0 g/dL    Globulin 3.6 2.0 - 4.0 g/dL    A-G Ratio 1.2 1.1 - 2.2     TROPONIN I    Collection Time: 03/22/21 10:22 AM   Result Value Ref Range    Troponin-I, Qt. 0.15 (H) <0.05 ng/mL   EKG, 12 LEAD, INITIAL    Collection Time: 03/22/21 10:24 AM   Result Value Ref Range    Ventricular Rate 77 BPM    Atrial Rate 77 BPM    P-R Interval 146 ms    QRS Duration 92 ms    Q-T Interval 366 ms    QTC Calculation (Bezet) 414 ms    Calculated P Axis 46 degrees    Calculated R Axis 36 degrees    Calculated T Axis 44 degrees    Diagnosis       Sinus rhythm with premature atrial complexes in a pattern of bigeminy  When compared with ECG of 17-MAR-2021 10:40,  premature atrial complexes are now present     TROPONIN I    Collection Time: 03/22/21  3:33 PM   Result Value Ref Range    Troponin-I, Qt. 0.50 (H) <0.05 ng/mL       Radiologic Studies -   CT SPINE CERV WO CONT   Final Result      1. No fracture. 2. Moderate central spinal canal stenosis at C6-C7. 3. C3-C4 mild left foraminal stenosis. CTA CHEST W OR W WO CONT   Final Result   1.  No pulmonary embolism. 2. Normal thoracic aorta. 3. No acute process in the lungs. CT Results  (Last 48 hours)               03/22/21 1324  CT SPINE CERV WO CONT Final result    Impression:      1. No fracture. 2. Moderate central spinal canal stenosis at C6-C7. 3. C3-C4 mild left foraminal stenosis. Narrative:  EXAM:  CT CERVICAL SPINE WITHOUT CONTRAST       INDICATION: Central chest pain and left upper extremity pain this morning   without injury. COMPARISON: Cervical spine views on 7/20/2018       CONTRAST:  None. TECHNIQUE: Multislice helical CT of the cervical spine was performed without   intravenous contrast administration. Sagittal and coronal reformats were   generated. CT dose reduction was achieved through use of a standardized   protocol tailored for this examination and automatic exposure control for dose   modulation. FINDINGS:       1 mm anterior subluxation of C4 on C5. Asymmetric left moderate facet arthrosis   C2-C6. There is no fracture or compression deformity. The odontoid process is   intact. The craniocervical junction is within normal limits. Disc degeneration   is mild-moderate at C5-6, C6-7, and C7-T1. Ossification of the ligament   posterior to the thecal sac at C6 measures 0.8 cm in craniocaudal dimension. Biapical subpleural scarring and/or nodularity is partially evaluated. Bilateral   carotid bifurcation calcific atherosclerosis is present. C2-C3: Small central disc protrusion without stenosis. C3-C4: Asymmetric left uncovertebral joint hypertrophy and facet arthrosis. Mild   left foraminal stenosis. C4-C5: There is no spinal canal or neural foraminal stenosis. C5-C6: There is no spinal canal or neural foraminal stenosis. C6-C7: Posterior disc osteophyte complex. Moderate central spinal canal   stenosis. C7-T1: There is no spinal canal or neural foraminal stenosis.            03/22/21 1324  CTA CHEST W OR W WO CONT Final result    Impression:  1. No pulmonary embolism. 2. Normal thoracic aorta. 3. No acute process in the lungs. Narrative:  EXAM:  CTA CHEST W OR W WO CONT       INDICATION: Chest pain and left upper extremity pain this morning. COMPARISON: None. TECHNIQUE: Helical thin section chest CT following uneventful intravenous   administration of nonionic contrast 100 mL of isovue 370 according to   departmental aortic dissection protocol. Coronal and sagittal reformats were   performed. 3D post processing was performed. CT dose reduction was achieved   through the use of a standardized protocol tailored for this examination and   automatic exposure control for dose modulation. FINDINGS:    THORACIC AORTA: No aneurysm or dissection. Ascending thoracic aortic diameter is   3.4 cm. Aortic arch diameter is 2.3 cm. Proximal descending thoracic aortic   diameter is 2.5 cm. Pulmonary artery: This is a good quality study for the evaluation of pulmonary   embolism to the segmental arterial level. There is no pulmonary embolism to this   level. Chest wall: No mass or lymphadenopathy. THYROID: No nodule. MEDIASTINUM: No mass or lymphadenopathy. NICK: No mass or lymphadenopathy. HEART: Normal in size. ESOPHAGUS: No wall thickening or dilatation. TRACHEA/BRONCHI: Patent. PLEURA: No effusion or pneumothorax. LUNGS: Bilateral upper lobe apical nodular scarring is of no clinical   significance. No lung mass or pneumonia. No unexpected groundglass opacity. No   evidence of chronic interstitial lung disease. UPPER ABDOMEN: Partially imaged. No acute pathology. BONES: Right curvature of the thoracic spine. No rib fracture or suspicious bone   pathology. CXR Results  (Last 48 hours)    None          Medical Decision Making   I am the first provider for this patient.     I reviewed the vital signs, available nursing notes, past medical history, past surgical history, family history and social history. Vital Signs-Reviewed the patient's vital signs. Patient Vitals for the past 24 hrs:   Temp Pulse Resp BP SpO2   03/22/21 1012 97.9 °F (36.6 °C) 84 16 (!) 178/87 98 %       EKG interpretation: (Preliminary)  Sinus arrhythmia, rate of 77, no acute ST changes. QTC within normal limits at 414. EKG interpretation by Hitesh Gallo MD    Records Reviewed: Nursing Notes and Old Medical Records    Provider Notes (Medical Decision Making):   47-year-old female presenting for evaluation of sharp substernal chest pain radiating to the left arm that began early this morning. Patient is hypertensive on arrival with a systolic blood pressure in the 170s, all other vital signs within normal limits. Her EKG here is nonischemic. Initial troponin is elevated at 0.15, although most recent troponins during her inpatient course a week ago were even more elevated. Remainder of labs largely unremarkable. Patient is given 1 tab of nitro here for elevated blood pressure as well as to help with her chest pain further. Patient was discussed with cardiology, who will order CTA chest to rule out for aortic pathology such as aneurysm or dissection. Also recommended obtaining CT cervical spine to rule out for radiculopathy symptoms, this has been ordered by myself per request.  Cardiology ordered GI cocktail to help rule out for possible GI component of chest pain. CTA chest is negative for PE, or thoracic aortic process. CT cervical spine with findings of cervical stenosis at C6-C7, as well as C3-C4 mild left foraminal stenosis. Patient is neurovascularly intact in bilateral upper extremities. Likely chronic, doubt acute severe neurogenic claudication/impingement. On repeat evaluation, patient endorsing feeling improved after tab of nitro here, currently chest pain-free. Repeat troponin has been ordered by cardiology. This returned more elevated at 0.5.   Patient was re-discussed with cardiology- who recommended admission considering increasing troponins. She was subsequently discussed with the hospitalist, and is accepted for admission in stable condition. ED Course as of Mar 22 1811   Mon Mar 22, 2021   1618 Troponin more elevated at 0.50 from initial 0.15. Patient's chest pain is actually improved at this time, states that additional tab of nitro helped take away the pain. Will re-discuss with cardiology considering troponin elevation. [JM]   3590 Consult Note:  Juana Cavazos MD spoke with Dr. Larene Favre,   Specialty: Cardiology  Discussed pt's hx, disposition, and available diagnostic and imaging results. Reviewed care plans. Agree with management and plan thus far. Consultant recommends Lovenox dose in ED, loading with 300mg Plavix, then 75 mg PO plavix daily. Admit to Hospitalist. .          [HW]   3801 Consult Note:  Juana Cavazos MD spoke with Dr. Priscila Cabrera,   Specialty: Hospitalist  Discussed pt's hx, disposition, and available diagnostic and imaging results. Reviewed care plans. Agree with management and plan thus far. Consultant will evaluate pt for admission.           [HW]      ED Course User Index  [HW] Parisa Castle MD  [JM] Elroy Veloz MD     CRITICAL CARE NOTE:    Authorized and Performed by: Sami Miranda MD    IMPENDING DETERIORATION -Cardiovascular  ASSOCIATED RISK FACTORS - Hypotension, Dysrhythmia and Vascular Compromise  MANAGEMENT- Bedside Assessment  INTERPRETATION -  Xrays, ECG, Blood Pressure and Cardiac Output Measures   INTERVENTIONS - hemodynamic mngmt and vascular control  CASE REVIEW - Hospitalist/Intensivist, Medical Sub-Specialist, Nursing and Family  TREATMENT RESPONSE -Improved and Stable  PERFORMED BY - Self    I have spent 65 minutes of critical care time involved in obtaining a history, examining the patient, lab review, arranging urgent treatment with development of a management plan, consultations with other providers, family decision- making, bedside attention and documentation. During this entire length of time I was immediately available to the patient . Critical Care: The reason for providing this level of medical care for this critically ill patient was due to a critical illness that impaired one or more vital organ systems, such that there was a high probability of imminent or life threatening deterioration in the patient's condition. This care involved high complexity decision making to assess, manipulate, and support vital system functions, to treat this degree of vital organ system failure, and to prevent further life threatening deterioration of the patients condition. Critical care time was exclusive of separately billable procedures. Andreea Gill MD      Disposition:  Admit    Diagnosis     Clinical Impression:   1. NSTEMI (non-ST elevated myocardial infarction) ([facility] [address]    2. Atypical chest pain    3. Cervical stenosis of spine        Attestations:    Andreea Gill MD    Please note that this dictation was completed with [employer], the computer voice recognition software. Quite often unanticipated grammatical, syntax, homophones, and other interpretive errors are inadvertently transcribed by the computer software. Please disregard these errors. Please excuse any errors that have escaped final proofreading. Thank you.

## 2021-03-22 NOTE — PROGRESS NOTES
Pharmacy Medication Reconciliation     The patient was interviewed regarding current PTA medication list, use and drug allergies;  patient present in room and obtained permission from patient to discuss drug regimen with visitor(s) present. The patient was questioned regarding use of any other inhalers, topical products, over the counter medications, herbal medications, vitamin products or ophthalmic/nasal/otic medication use. Allergy Update: Codeine    Recommendations/Findings: The following amendments were made to the patient's active medication list on file at [address]:   1) Additions: none  2) Deletions: none  3) Changes: none  Pertinent Findings:   -patient recently taken off brilanta (16 months since stent) and switched from atorvastatin to rosuvastatin.    -Clarified PTA med list with patient interview, rX query. PTA medication list was corrected to the following:     Prior to Admission Medications   Prescriptions Last Dose Informant Taking? amLODIPine (NORVASC) 5 mg tablet 3/22/2021 at 0900 Self Yes   Sig: Take 7.5 mg by mouth daily. aspirin 81 mg chewable tablet 3/22/2021 at 0800 Self Yes   Sig: Take 1 Tab by mouth daily. isosorbide mononitrate ER (IMDUR) 30 mg tablet 3/22/2021 at 0800 Self Yes   Sig: TAKE 1 TABLET BY MOUTH EVERY DAY TO PREVENT CHEST PAIN, CALL MD IF SIGNIFICANT HEADACHE   meloxicam (MOBIC) 15 mg tablet  Self Yes   Sig: TAKE 1 TABLET BY MOUTH EVERY DAY AS NEEDED FOR ARTHRITIS ORALLY 30 DAY(S)   metoprolol succinate (TOPROL-XL) 50 mg XL tablet 3/22/2021 at 0800 Self Yes   Sig: TAKE 1 TABLET BY MOUTH EVERY DAY   nitroglycerin (NITROSTAT) 0.4 mg SL tablet  Self Yes   Si Tab by SubLINGual route every five (5) minutes as needed for Chest Pain. Up to 3 doses. rosuvastatin (Crestor) 40 mg tablet 3/21/2021 at 2100 Self Yes   Sig: Take 1 Tab by mouth nightly.       Facility-Administered Medications: None        Thank you,  SHANE Yoo

## 2021-03-22 NOTE — CONSULTS
[address] [facility]     Date of  Admission: 3/22/2021 11:01 AM     Admission type:Emergency    Consult for: Chest Pain, elevated troponin   Consult by: Dr. Melissa Tucker     Subjective:     Adriana Otto is a 63 y.o. female with a PMH significant for ASHD, HTN, HLD, venous insufficieny of bilateral lower extremities admitted for chest pain. Per ED RN note, the patient presented this morning with chief complaint of chest pain which was relieved by a second dose of SL nitroglycerin.      Upon assessment, the patient states at 6:39 this morning she developed mid-sternal stabbing chest pain which woke her from her sleep and radiated down her left arm. She states the pain at worse was a 9/10, not relieved by an initial nitroglycerin tablet. She also states it was associated with diaphoresis. She states had she been home alone, she would have called EMS, but called the cardiology office insteadt, whom of which sent her to the ED for further evaluation. She states she just had a negative cardiac cath last week when she came to the ED with similar complaint. She denies any history of reflux, hiatal hernia, or GI issues. She reports compliance with medications. She denies ay fever, chills, dizziness, palpitations, lightheadedness, or sensation like her heart is racing. She states she has a strong family history for heart disease on both sides, including her father with CABG x 4 at age 48. She denies smoking, ETOH use, or illicit drug use. She does endorse intermittent leg edema, chronic venous stasis which she states she wears compression stockings for.      Established patient of Dr. Jesus Dee, last seen Monday 3/15/2021 for virtual visit.  Presented to ED on Wednesday 3/17/2021 with unstable angina, underwent cardiac catheterization which showed ostial LAD stent had mild ISR, 30% stenosed, FFR 0.92, all other vessels anatomically normal.  Initial cardiac cath 2018, repeat cath 2019 stent to LAD. Nuclear stress 10/2020 negative. EKG with no acute changes.      Cardiac risk factors: family history, dyslipidemia, hypertension, stress, post-menopausal.        Patient Active Problem List    Diagnosis Date Noted    Chest pain 03/22/2021    S/P cardiac cath 03/18/2021    HTN (hypertension) 03/17/2021    HLD (hyperlipidemia) 03/17/2021    Venous insufficiency of both lower extremities 07/21/2020    Varicose veins of both legs with edema 07/21/2020    CAD (coronary artery disease) 07/21/2020    Unstable angina ([address] 04/26/2017      Lina Rodriguez MD  Past Medical History:   Diagnosis Date    CAD (coronary artery disease)     Hypertension       Social History     Socioeconomic History    Marital status:      Spouse name: Not on file    Number of children: Not on file    Years of education: Not on file    Highest education level: Not on file   Tobacco Use    Smoking status: Never Smoker    Smokeless tobacco: Never Used   Substance and Sexual Activity    Alcohol use: Yes     Frequency: 4 or more times a week     Comment: 5-6 beers a week    Drug use: No    Sexual activity: Never     Allergies   Allergen Reactions    Codeine Nausea and Vomiting      Family History   Problem Relation Age of Onset    Heart Disease Mother     Heart Disease Father       Current Facility-Administered Medications   Medication Dose Route Frequency    maalox/viscous lidocaine (COV GI COCKTAIL)  40 mL Oral ONCE    iopamidoL (ISOVUE-370) 76 % injection 100 mL  100 mL IntraVENous RAD ONCE     Current Outpatient Medications   Medication Sig    rosuvastatin (Crestor) 40 mg tablet Take 1 Tab by mouth nightly.     meloxicam (MOBIC) 15 mg tablet TAKE 1 TABLET BY MOUTH EVERY DAY AS NEEDED FOR ARTHRITIS ORALLY 30 DAY(S)    isosorbide mononitrate ER (IMDUR) 30 mg tablet TAKE 1 TABLET BY MOUTH EVERY DAY TO PREVENT CHEST PAIN, CALL MD IF SIGNIFICANT HEADACHE    metoprolol succinate (TOPROL-XL) 50 mg XL tablet TAKE 1 TABLET BY MOUTH EVERY DAY    nitroglycerin (NITROSTAT) 0.4 mg SL tablet 1 Tab by SubLINGual route every five (5) minutes as needed for Chest Pain. Up to 3 doses.  aspirin 81 mg chewable tablet Take 1 Tab by mouth daily.  amLODIPine (NORVASC) 5 mg tablet Take 7.5 mg by mouth daily. Review of Symptoms:   11 systems reviewed, negative other than as stated in the HPI        Objective:      Visit Vitals  BP (!) 160/78   Pulse 90   Temp 98.3 °F (36.8 °C)   Resp 21   Ht 5' 3\" (1.6 m)   Wt 66.7 kg (147 lb 0.8 oz)   SpO2 99%   BMI 26.05 kg/m²       Physical:   General: WNWA elderly  female in NAD sitting up on stretcher  Heart: RRR, no m/S3/JVD, no carotid bruits   Lungs: clear throughout   Abdomen: Soft, +BS, NTND   Extremities: LE james +DP/PT, no edema   Neurologic: Grossly normal, mildly anxious, excellent historian     Data Review:   Recent Labs     03/22/21  1022   WBC 7.1   HGB 12.2   HCT 36.2        Recent Labs     03/22/21  1022   *   K 4.3      CO2 27   *   BUN 18   CREA 0.60   CA 9.1   ALB 4.2   TBILI 0.4   ALT 59       Recent Labs     03/22/21  1022   TROIQ 0.15*       No intake or output data in the 24 hours ending 03/22/21 1214     Cardiographics    Telemetry: NSR   ECG: NSR, no acute changes   CTA Chest and CT cervical spine pending     Assessment:       Principal Problem:    Chest pain (3/22/2021)    Active Problems:    CAD (coronary artery disease) (7/21/2020)      HTN (hypertension) (3/17/2021)      HLD (hyperlipidemia) (3/17/2021)         Plan:   John Naik is a 63 y.o. female with a PMH significant for ASHD, HTN, HLD, venous insufficieny of bilateral lower extremities admitted for Unstable angina ([facility] [address] [I20.0]. Cardiology consulted for chest pain.  Initial troponin 0.15, no acute EKG changes, K 4.3, Cr 0.60, LDL 90, H/H 12.2/36.2, plt 214, WBC 7.1 Received [employer] Covid Vaccine week before last.      Chest Pain/Hx ASHD with stents/Mildy elevated troponin s/p cardiac cath last week:   Continue to monitor telemetry  S/p PCI/DARRELL for ISR to prox LAD 10/19 performed by Dr. Madeline Mathur  Negative NST in 10/2020; 12/19  S/p Cardiac catheterization 3/18/2021; FFR of LAD 0.92, ostial LAD stent 30% stenosis/mild ISR. Continue ASA 81 mg PO daily  Trend troponin  Obtain a CTA chest with and without contrast to r/o aortic aneurysm with current HTN. Obtain CT cervical spine to r/o cervical radiculopathy   Just had Brilinta DC'd on Monday 3/15/2021 per Cardiology as is protocol, cardiac cath was 10/2019, Brilinta onboard 16 months   Continue BB, CCB, Imdur, Statin  PRN SL nitro  O2 as needed-currently on RA  Trend cardiac enzymes, initial mildly elevated 0.15 (s/p cardiac cath. Last Thursday)   EKG reviewed, no acute changes  Also try GI cocktail to r/o GI cause for recurrent chest pain      HTN: elevated   Resume norvasc,Toprol XL, Imdur please      HLD:  Continue Statin      Chronic Venous Insufficiency:   Follows with Dr. Oleg Edwards  Patient reports she wears compression stockings  Plan is for RF ablation of bilateral GSV      Thank you for consulting [facility]. Will follow. Addendum 1406:  Results of CTA chest and cervical spine noted:   CTA Chest showed:   IMPRESSION  1. No pulmonary embolism. 2. Normal thoracic aorta. 3. No acute process in the lungs. CT Cervical Spine:   IMPRESSION  1. No fracture. 2. Moderate central spinal canal stenosis at C6-C7. 3. C3-C4 mild left foraminal stenosis. Spoke to Patient to let her know of more recent f/u scheduled with Dr. Michael Edwards on April 9th, 2021. I also let her know we would be resuming her Plavix 75 mg PO daily starting today and continuing at minimum until her appointment with Dr. Brendon Foster on April 9th. She expressed understanding of this plan. Also discussed potential referral to GI specialist and she declined at this time. Patient seen and examined by me with the above nurse practitioner.   I personally performed all components of the history, physical, and medical decision making and agree with the assessment and plan with minor modifications as noted. Today the patient presents with chest discomfort radiating down the left arm with recent negative stress test, malignant hypertension on presentation. General PE  Gen:  NAD  Mental Status - Alert. General Appearance - Not in acute distress. HEENT:  PERRL, no carotid bruits or JVD  Chest and Lung Exam   Inspection: Accessory muscles - No use of accessory muscles in breathing. Auscultation:   Breath sounds: - Normal.   Cardiovascular   Inspection: Jugular vein - Bilateral - Inspection Normal.   Palpation/Percussion:   Apical Impulse: - Normal.   Auscultation: Rhythm - Regular. Heart Sounds - S1 WNL and S2 WNL. No S3 or S4. Murmurs & Other Heart Sounds: Auscultation of the heart reveals - No Murmurs. Peripheral Vascular   Upper Extremity: Inspection - Bilateral - No Cyanotic nailbeds or Digital clubbing. Lower Extremity:   Palpation: Edema - Bilateral - No edema. Abdomen:   Soft, non-tender, bowel sounds are active. Neuro: A&O times 3, CN and motor grossly WNL    Patient presents with chest discomfort radiating down the left arm with malignant hypertension, mildly abnormal troponin. Recent cardiac catheterization negative for any significant stenoses. Also had D-dimer negative which makes PE unlikely. Check CTA to rule out aortic dissection and CT of neck to rule out cervical radiculopathy. If that is negative, would lean toward outpatient GI evaluation. Addendum:  With rising troponin to 0.5 we will recommend observation overnight. Suspect this could have been due to malignant hypertension. Alternative possibilities are coronary spasm or thrombus. Add Plavix. We will follow up in a.m.

## 2021-03-22 NOTE — H&P
Hospitalist Admission Note    NAME: Rachael Holguin   :  1955   MRN:  [id number]     Date/Time:  3/22/2021 5:30 PM    Patient PCP: Sandra Amor MD  ______________________________________________________________________  Given the patient's current clinical presentation, I have a high level of concern for decompensation if discharged from the emergency department. Complex decision making was performed, which includes reviewing the patient's available past medical records, laboratory results, and x-ray films. My assessment of this patient's clinical condition and my plan of care is as follows. Assessment / Plan:  Chest pain r/o ACS  CAD with history of PCI     Recent cath 4 days back:FFR of LAD 0.92, ostial LAD stent 30% stenosis/mild ISR. no significant CAD, so no stenting done. Had Cath and PCI/stenting in   Troponin 0.15--0.5, trend 2 more sets. EKG show no acute ischemia  Given her recent cath with no significant cardiac disease, ?could be vasospastic angina. Already received lovenox therapeutic dose and plavix loading dose  C/w aspirin 81, Plavix 75, betablocker, statin  Will give Nitropaste for pain control  CTA: showed no PE, normal thoracic aorta  F/u ECHO  F/u Cardiology   Will keep NPO after Midnight for possible Procedure in AM    HTN  HLD  Chronic venous insufficiency    C/w home meds    Code Status: Full code  Surrogate Decision Maker: Her     DVT Prophylaxis: Received lovenox full dose today  GI Prophylaxis: not indicated    Baseline: Ambulatory      Subjective:   CHIEF COMPLAINT: Chest pain    HISTORY OF PRESENT ILLNESS:     Rachael Holguin is a 63 y.o. female with past medical history of CAD s/p PCI , hypertension presented with chest pain noted started this morning. She has this substernal chest pain, severe in intensity, present at rest and relieved partially by nitroglycerin, lasting for hours.   Currently during my evaluation she does not have chest pain. She denies any fever, cough, belly pain, change in bladder or bowel habits, lower extremity swelling or tenderness. She was recently admitted to the hospital with chest pain, had a cath 4 days back, FFR of LAD 0.92, ostial LAD stent 30% stenosis/mild ISR. We were asked to admit for work up and evaluation of the above problems. Past Medical History:   Diagnosis Date    CAD (coronary artery disease)     Hypertension         Past Surgical History:   Procedure Laterality Date    HX CORONARY STENT PLACEMENT  04/26/2017    HX GYN      vaginal wall repair    HX OTHER SURGICAL      surgery for fissure    CT BREAST SURGERY PROCEDURE UNLISTED      cystectomy x2       Social History     Tobacco Use    Smoking status: Never Smoker    Smokeless tobacco: Never Used   Substance Use Topics    Alcohol use: Yes     Frequency: 4 or more times a week     Comment: 5-6 beers a week        Family History   Problem Relation Age of Onset    Heart Disease Mother     Heart Disease Father      Allergies   Allergen Reactions    Codeine Nausea and Vomiting        Prior to Admission medications    Medication Sig Start Date End Date Taking? Authorizing Provider   clopidogreL (Plavix) 75 mg tab Take 1 Tab by mouth daily for 18 days. Indications: blood clot prevention following percutaneous coronary intervention 3/22/21 4/9/21 Yes Sugey Sibley NP   rosuvastatin (Crestor) 40 mg tablet Take 1 Tab by mouth nightly. 3/11/21  Yes Ajay CAMPA NP   isosorbide mononitrate ER (IMDUR) 30 mg tablet TAKE 1 TABLET BY MOUTH EVERY DAY TO PREVENT CHEST PAIN, CALL MD IF SIGNIFICANT HEADACHE 12/14/20  Yes Elzbieta Koehler MD   metoprolol succinate (TOPROL-XL) 50 mg XL tablet TAKE 1 TABLET BY MOUTH EVERY DAY 6/4/20  Yes Provider, Historical   nitroglycerin (NITROSTAT) 0.4 mg SL tablet 1 Tab by SubLINGual route every five (5) minutes as needed for Chest Pain. Up to 3 doses.  10/29/19  Yes Neli Velasco MD JULIA   aspirin 81 mg chewable tablet Take 1 Tab by mouth daily. 4/27/17  Yes Bridgette Rankin MD   amLODIPine (NORVASC) 5 mg tablet Take 7.5 mg by mouth daily. Yes Provider, Historical       REVIEW OF SYSTEMS:     Total of 12 systems reviewed as follows:       POSITIVE= underlined text  Negative = text not underlined  General:  fever, chills, sweats, generalized weakness, weight loss/gain,      loss of appetite   Eyes:    blurred vision, eye pain, loss of vision, double vision  ENT:    rhinorrhea, pharyngitis   Respiratory:   cough, sputum production, SOB, MONTEMAYOR, wheezing, pleuritic pain   Cardiology:   chest pain, palpitations, orthopnea, PND, edema, syncope   Gastrointestinal:  abdominal pain , N/V, diarrhea, dysphagia, constipation, bleeding   Genitourinary:  frequency, urgency, dysuria, hematuria, incontinence   Muskuloskeletal :  arthralgia, myalgia, back pain  Hematology:  easy bruising, nose or gum bleeding, lymphadenopathy   Dermatological: rash, ulceration, pruritis, color change / jaundice  Endocrine:   hot flashes or polydipsia   Neurological:  headache, dizziness, confusion, focal weakness, paresthesia,     Speech difficulties, memory loss, gait difficulty  Psychological: Feelings of anxiety, depression, agitation    Objective:   VITALS:    Visit Vitals  BP (!) 140/67   Pulse 76   Temp 98.3 °F (36.8 °C)   Resp 17   Ht 5' 3\" (1.6 m)   Wt 66.7 kg (147 lb 0.8 oz)   SpO2 97%   BMI 26.05 kg/m²       PHYSICAL EXAM:    General:    Alert, cooperative, no distress, appears stated age. HEENT: Atraumatic, anicteric sclerae, pink conjunctivae     No oral ulcers, mucosa moist, throat clear, dentition fair  Neck:  Supple, symmetrical,  thyroid: non tender  Lungs:   Clear to auscultation bilaterally. No Wheezing or Rhonchi. No rales. Chest wall:  No tenderness  No Accessory muscle use. Heart:   Regular  rhythm,  No  murmur   No edema  Abdomen:   Soft, non-tender. Not distended.   Bowel sounds normal  Extremities: No cyanosis. No clubbing,      Skin turgor normal, Capillary refill normal, Radial dial pulse 2+  Skin:     Not pale. Not Jaundiced  No rashes   Psych:  Good insight. Not depressed. Not anxious or agitated. Neurologic: EOMs intact. No facial asymmetry. No aphasia or slurred speech. Symmetrical strength, Sensation grossly intact. Alert and oriented X 4.     _______________________________________________________________________  Care Plan discussed with:    Comments   Patient y    Family      RN y    Care Manager                    Consultant:      _______________________________________________________________________  Expected  Disposition:   Home with Family y   HH/PT/OT/RN    SNF/LTC    NELY    ________________________________________________________________________  TOTAL TIME: 28 Minutes    Critical Care Provided     Minutes non procedure based      Comments     Reviewed previous records   >50% of visit spent in counseling and coordination of care  Discussion with patient and/or family and questions answered       ________________________________________________________________________  Signed: Millie Ramey MD    Procedures: see electronic medical records for all procedures/Xrays and details which were not copied into this note but were reviewed prior to creation of Plan.     LAB DATA REVIEWED:    Recent Results (from the past 24 hour(s))   CBC WITH AUTOMATED DIFF    Collection Time: 03/22/21 10:22 AM   Result Value Ref Range    WBC 7.1 3.6 - 11.0 K/uL    RBC 3.98 3.80 - 5.20 M/uL    HGB 12.2 11.5 - 16.0 g/dL    HCT 36.2 35.0 - 47.0 %    MCV 91.0 80.0 - 99.0 FL    MCH 30.7 26.0 - 34.0 PG    MCHC 33.7 30.0 - 36.5 g/dL    RDW 12.6 11.5 - 14.5 %    PLATELET 128 741 - 774 K/uL    MPV 10.0 8.9 - 12.9 FL    NRBC 0.0 0  WBC    ABSOLUTE NRBC 0.00 0.00 - 0.01 K/uL    NEUTROPHILS 60 32 - 75 %    LYMPHOCYTES 31 12 - 49 %    MONOCYTES 8 5 - 13 %    EOSINOPHILS 0 0 - 7 %    BASOPHILS 1 0 - 1 %    IMMATURE GRANULOCYTES 0 0.0 - 0.5 %    ABS. NEUTROPHILS 4.3 1.8 - 8.0 K/UL    ABS. LYMPHOCYTES 2.2 0.8 - 3.5 K/UL    ABS. MONOCYTES 0.5 0.0 - 1.0 K/UL    ABS. EOSINOPHILS 0.0 0.0 - 0.4 K/UL    ABS. BASOPHILS 0.1 0.0 - 0.1 K/UL    ABS. IMM. GRANS. 0.0 0.00 - 0.04 K/UL    DF AUTOMATED     METABOLIC PANEL, COMPREHENSIVE    Collection Time: 03/22/21 10:22 AM   Result Value Ref Range    Sodium 135 (L) 136 - 145 mmol/L    Potassium 4.3 3.5 - 5.1 mmol/L    Chloride 104 97 - 108 mmol/L    CO2 27 21 - 32 mmol/L    Anion gap 4 (L) 5 - 15 mmol/L    Glucose 133 (H) 65 - 100 mg/dL    BUN 18 6 - 20 MG/DL    Creatinine 0.60 0.55 - 1.02 MG/DL    BUN/Creatinine ratio 30 (H) 12 - 20      GFR est AA >60 >60 ml/min/1.73m2    GFR est non-AA >60 >60 ml/min/1.73m2    Calcium 9.1 8.5 - 10.1 MG/DL    Bilirubin, total 0.4 0.2 - 1.0 MG/DL    ALT (SGPT) 59 12 - 78 U/L    AST (SGOT) 34 15 - 37 U/L    Alk.  phosphatase 96 45 - 117 U/L    Protein, total 7.8 6.4 - 8.2 g/dL    Albumin 4.2 3.5 - 5.0 g/dL    Globulin 3.6 2.0 - 4.0 g/dL    A-G Ratio 1.2 1.1 - 2.2     TROPONIN I    Collection Time: 03/22/21 10:22 AM   Result Value Ref Range    Troponin-I, Qt. 0.15 (H) <0.05 ng/mL   EKG, 12 LEAD, INITIAL    Collection Time: 03/22/21 10:24 AM   Result Value Ref Range    Ventricular Rate 77 BPM    Atrial Rate 77 BPM    P-R Interval 146 ms    QRS Duration 92 ms    Q-T Interval 366 ms    QTC Calculation (Bezet) 414 ms    Calculated P Axis 46 degrees    Calculated R Axis 36 degrees    Calculated T Axis 44 degrees    Diagnosis       Sinus rhythm with premature atrial complexes in a pattern of bigeminy  When compared with ECG of 17-MAR-2021 10:40,  premature atrial complexes are now present     TROPONIN I    Collection Time: 03/22/21  3:33 PM   Result Value Ref Range    Troponin-I, Qt. 0.50 (H) <0.05 ng/mL

## 2021-03-23 ENCOUNTER — APPOINTMENT (OUTPATIENT)
Dept: NON INVASIVE DIAGNOSTICS | Age: 66
End: 2021-03-23
Attending: STUDENT IN AN ORGANIZED HEALTH CARE EDUCATION/TRAINING PROGRAM
Payer: COMMERCIAL

## 2021-03-23 VITALS
OXYGEN SATURATION: 97 % | SYSTOLIC BLOOD PRESSURE: 120 MMHG | TEMPERATURE: 97.4 F | RESPIRATION RATE: 15 BRPM | BODY MASS INDEX: 26.05 KG/M2 | DIASTOLIC BLOOD PRESSURE: 63 MMHG | HEIGHT: 63 IN | WEIGHT: 147 LBS | HEART RATE: 76 BPM

## 2021-03-23 LAB
ANION GAP SERPL CALC-SCNC: 7 MMOL/L (ref 5–15)
ATRIAL RATE: 77 BPM
BUN SERPL-MCNC: 18 MG/DL (ref 6–20)
BUN/CREAT SERPL: 31 (ref 12–20)
CALCIUM SERPL-MCNC: 8.7 MG/DL (ref 8.5–10.1)
CALCULATED P AXIS, ECG09: 46 DEGREES
CALCULATED R AXIS, ECG10: 36 DEGREES
CALCULATED T AXIS, ECG11: 44 DEGREES
CHLORIDE SERPL-SCNC: 103 MMOL/L (ref 97–108)
CO2 SERPL-SCNC: 25 MMOL/L (ref 21–32)
CREAT SERPL-MCNC: 0.58 MG/DL (ref 0.55–1.02)
DIAGNOSIS, 93000: NORMAL
ECHO AO ASC DIAM: 2.99 CM
ECHO AO ROOT DIAM: 3.8 CM
ECHO AV AREA PEAK VELOCITY: 2.54 CM2
ECHO AV AREA VTI: 2.69 CM2
ECHO AV AREA/BSA PEAK VELOCITY: 1.5 CM2/M2
ECHO AV AREA/BSA VTI: 1.6 CM2/M2
ECHO AV MEAN GRADIENT: 2 MMHG
ECHO AV PEAK GRADIENT: 3.75 MMHG
ECHO AV PEAK VELOCITY: 96.81 CM/S
ECHO AV VTI: 18.46 CM
ECHO LA AREA 4C: 10.63 CM2
ECHO LA MAJOR AXIS: 2.64 CM
ECHO LA MINOR AXIS: 1.56 CM
ECHO LA VOL 4C: 21.63 ML (ref 22–52)
ECHO LA VOLUME INDEX A4C: 12.75 ML/M2 (ref 16–28)
ECHO LV E' LATERAL VELOCITY: 10.01 CM/S
ECHO LV E' SEPTAL VELOCITY: 5.93 CM/S
ECHO LV EDV A4C: 87.12 ML
ECHO LV EDV INDEX A4C: 51.3 ML/M2
ECHO LV EJECTION FRACTION A4C: 64 PERCENT
ECHO LV ESV A4C: 31.77 ML
ECHO LV ESV INDEX A4C: 18.7 ML/M2
ECHO LV INTERNAL DIMENSION DIASTOLIC MMODE: 3.73 CM
ECHO LV INTERNAL DIMENSION DIASTOLIC: 3.4 CM (ref 3.9–5.3)
ECHO LV INTERNAL DIMENSION SYSTOLIC MMODE: 2.19 CM
ECHO LV INTERNAL DIMENSION SYSTOLIC: 2.77 CM
ECHO LV IVSD: 1.35 CM (ref 0.6–0.9)
ECHO LV MASS 2D: 155.8 G (ref 67–162)
ECHO LV MASS INDEX 2D: 91.8 G/M2 (ref 43–95)
ECHO LV POSTERIOR WALL DIASTOLIC: 1.34 CM (ref 0.6–0.9)
ECHO LVOT CARDIAC OUTPUT: 3.43 LITER/MINUTE
ECHO LVOT DIAM: 1.91 CM
ECHO LVOT PEAK GRADIENT: 2.97 MMHG
ECHO LVOT PEAK VELOCITY: 86.18 CM/S
ECHO LVOT SV: 49.7 ML
ECHO LVOT VTI: 17.38 CM
ECHO MV A VELOCITY: 99.92 CM/S
ECHO MV AREA PHT: 3.12 CM2
ECHO MV AREA VTI: 2.72 CM2
ECHO MV E DECELERATION TIME (DT): 256.28 MS
ECHO MV E VELOCITY: 68.09 CM/S
ECHO MV E/A RATIO: 0.68
ECHO MV E/E' LATERAL: 6.8
ECHO MV E/E' RATIO (AVERAGED): 9.14
ECHO MV E/E' SEPTAL: 11.48
ECHO MV MAX VELOCITY: 98.31 CM/S
ECHO MV MEAN GRADIENT: 1.03 MMHG
ECHO MV PEAK GRADIENT: 3.87 MMHG
ECHO MV PRESSURE HALF TIME (PHT): 70.55 MS
ECHO MV VTI: 18.27 CM
ECHO PV MAX VELOCITY: 119.45 CM/S
ECHO PV PEAK INSTANTANEOUS GRADIENT SYSTOLIC: 5.71 MMHG
ECHO TV REGURGITANT MAX VELOCITY: 188.43 CM/S
ECHO TV REGURGITANT PEAK GRADIENT: 14.2 MMHG
ERYTHROCYTE [DISTWIDTH] IN BLOOD BY AUTOMATED COUNT: 12.6 % (ref 11.5–14.5)
GLUCOSE SERPL-MCNC: 117 MG/DL (ref 65–100)
HCT VFR BLD AUTO: 34.7 % (ref 35–47)
HGB BLD-MCNC: 11.6 G/DL (ref 11.5–16)
LVOT MG: 1.17 MMHG
MCH RBC QN AUTO: 30.2 PG (ref 26–34)
MCHC RBC AUTO-ENTMCNC: 33.4 G/DL (ref 30–36.5)
MCV RBC AUTO: 90.4 FL (ref 80–99)
NRBC # BLD: 0 K/UL (ref 0–0.01)
NRBC BLD-RTO: 0 PER 100 WBC
P-R INTERVAL, ECG05: 146 MS
PLATELET # BLD AUTO: 212 K/UL (ref 150–400)
PMV BLD AUTO: 9.8 FL (ref 8.9–12.9)
POTASSIUM SERPL-SCNC: 3.8 MMOL/L (ref 3.5–5.1)
Q-T INTERVAL, ECG07: 366 MS
QRS DURATION, ECG06: 92 MS
QTC CALCULATION (BEZET), ECG08: 414 MS
RBC # BLD AUTO: 3.84 M/UL (ref 3.8–5.2)
SODIUM SERPL-SCNC: 135 MMOL/L (ref 136–145)
TROPONIN I SERPL-MCNC: 0.59 NG/ML
VENTRICULAR RATE, ECG03: 77 BPM
WBC # BLD AUTO: 6.4 K/UL (ref 3.6–11)

## 2021-03-23 PROCEDURE — 74011250637 HC RX REV CODE- 250/637: Performed by: INTERNAL MEDICINE

## 2021-03-23 PROCEDURE — 36415 COLL VENOUS BLD VENIPUNCTURE: CPT

## 2021-03-23 PROCEDURE — 99218 HC RM OBSERVATION: CPT

## 2021-03-23 PROCEDURE — 85027 COMPLETE CBC AUTOMATED: CPT

## 2021-03-23 PROCEDURE — 93306 TTE W/DOPPLER COMPLETE: CPT | Performed by: INTERNAL MEDICINE

## 2021-03-23 PROCEDURE — 99233 SBSQ HOSP IP/OBS HIGH 50: CPT | Performed by: INTERNAL MEDICINE

## 2021-03-23 PROCEDURE — 84484 ASSAY OF TROPONIN QUANT: CPT

## 2021-03-23 PROCEDURE — 93306 TTE W/DOPPLER COMPLETE: CPT

## 2021-03-23 PROCEDURE — 74011250637 HC RX REV CODE- 250/637: Performed by: STUDENT IN AN ORGANIZED HEALTH CARE EDUCATION/TRAINING PROGRAM

## 2021-03-23 PROCEDURE — 80048 BASIC METABOLIC PNL TOTAL CA: CPT

## 2021-03-23 RX ORDER — AMLODIPINE BESYLATE 10 MG/1
10 TABLET ORAL DAILY
Qty: 30 TAB | Refills: 0 | Status: SHIPPED | OUTPATIENT
Start: 2021-03-23 | End: 2021-07-06 | Stop reason: SDUPTHER

## 2021-03-23 RX ORDER — NITROGLYCERIN 0.4 MG/1
0.4 TABLET SUBLINGUAL
Qty: 30 TAB | Refills: 1 | Status: SHIPPED | OUTPATIENT
Start: 2021-03-23

## 2021-03-23 RX ORDER — CLOPIDOGREL BISULFATE 75 MG/1
75 TABLET ORAL DAILY
Status: DISCONTINUED | OUTPATIENT
Start: 2021-03-23 | End: 2021-03-23 | Stop reason: HOSPADM

## 2021-03-23 RX ADMIN — Medication 10 ML: at 03:40

## 2021-03-23 RX ADMIN — NITROGLYCERIN 1 INCH: 20 OINTMENT TOPICAL at 08:37

## 2021-03-23 RX ADMIN — ASPIRIN 81 MG: 81 TABLET, CHEWABLE ORAL at 08:37

## 2021-03-23 RX ADMIN — METOPROLOL SUCCINATE 50 MG: 50 TABLET, EXTENDED RELEASE ORAL at 08:37

## 2021-03-23 RX ADMIN — AMLODIPINE BESYLATE 7.5 MG: 5 TABLET ORAL at 08:37

## 2021-03-23 RX ADMIN — ISOSORBIDE MONONITRATE 30 MG: 30 TABLET, EXTENDED RELEASE ORAL at 08:37

## 2021-03-23 RX ADMIN — CLOPIDOGREL BISULFATE 75 MG: 75 TABLET ORAL at 11:13

## 2021-03-23 NOTE — PROGRESS NOTES
[address], [facility], [facility]  [phone]      Cardiology Progress Note      3/23/2021 10:00 AM    Admit Date: 3/22/2021    Admit Diagnosis:   Acute coronary syndrome [facility]) [I24.9]    Subjective:     Jose Cantu states she experienced no further episodes of chest pain overnight. VSS. Upon tele. Review no ectopy noted. BP back to her baseline. Labs stable, Troponin peaked 0.88, trended down to 0.59.      Visit Vitals  /73 (BP 1 Location: Left upper arm, BP Patient Position: At rest)   Pulse 74   Temp 97.7 °F (36.5 °C)   Resp 18   Ht 5' 3\" (1.6 m)   Wt 66.7 kg (147 lb 0.8 oz)   LMP  (LMP Unknown)   SpO2 98%   BMI 26.05 kg/m²       Current Facility-Administered Medications   Medication Dose Route Frequency    clopidogreL (PLAVIX) tablet 75 mg  75 mg Oral DAILY    amLODIPine (NORVASC) tablet 7.5 mg  7.5 mg Oral DAILY    aspirin chewable tablet 81 mg  81 mg Oral DAILY    isosorbide mononitrate ER (IMDUR) tablet 30 mg  30 mg Oral DAILY    metoprolol succinate (TOPROL-XL) XL tablet 50 mg  50 mg Oral DAILY    nitroglycerin (NITROSTAT) tablet 0.4 mg  0.4 mg SubLINGual Q5MIN PRN    rosuvastatin (CRESTOR) tablet 40 mg  40 mg Oral QHS    sodium chloride (NS) flush 5-40 mL  5-40 mL IntraVENous Q8H    sodium chloride (NS) flush 5-40 mL  5-40 mL IntraVENous PRN    acetaminophen (TYLENOL) tablet 650 mg  650 mg Oral Q6H PRN    Or    acetaminophen (TYLENOL) suppository 650 mg  650 mg Rectal Q6H PRN    polyethylene glycol (MIRALAX) packet 17 g  17 g Oral DAILY PRN    promethazine (PHENERGAN) tablet 12.5 mg  12.5 mg Oral Q6H PRN    Or    ondansetron (ZOFRAN) injection 4 mg  4 mg IntraVENous Q6H PRN    nitroglycerin (NITROBID) 2 % ointment 1 Inch  1 Inch Topical BID       Objective:      Physical Exam:  General: WNWA elderly  female in NAD laying in bed   Heart: RRR, no m/S3/JVD, no carotid bruits   Lungs: clear throughout   Abdomen: Soft, +BS, NTND   Extremities: LE james +DP/PT, no edema   Neurologic: Grossly normal, excellent historian     Data Review:   Recent Labs     03/23/21  0334 03/22/21  1022   WBC 6.4 7.1   HGB 11.6 12.2   HCT 34.7* 36.2    214     Recent Labs     03/23/21  0334 03/22/21  1022   * 135*   K 3.8 4.3    104   CO2 25 27   * 133*   BUN 18 18   CREA 0.58 0.60   CA 8.7 9.1   ALB  --  4.2   TBILI  --  0.4   ALT  --  59       Recent Labs     03/23/21  0334 03/22/21  2107 03/22/21  1533 03/22/21  1022   TROIQ 0.59* 0.88* 0.50* 0.15*       No intake or output data in the 24 hours ending 03/23/21 1046     Cardiographics     Telemetry: NSR   ECG: NSR, no acute changes   ECHO: pending   CTA Chest and CT cervical spine:  CTA Chest showed:   IMPRESSION  1. No pulmonary embolism. 2. Normal thoracic aorta. 3. No acute process in the lungs.     CT Cervical Spine:   IMPRESSION  1. No fracture. 2. Moderate central spinal canal stenosis at C6-C7. 3. C3-C4 mild left foraminal stenosis. Assessment:     Principal Problem:    Chest pain (3/22/2021)    Active Problems:    CAD (coronary artery disease) (7/21/2020)      HTN (hypertension) (3/17/2021)      HLD (hyperlipidemia) (3/17/2021)      Acute coronary syndrome ([facility] [address] (3/22/2021)        Plan:     Kristal Jalloh a 63 y. o. female with a PMH significant for ASHD, HTN, HLD, venous insufficieny of bilateral lower extremities admitted for Unstable angina ([facility] [address] [I20.0].       Unstable angina/Hx ASHD with stents/Mildy elevated troponin s/p cardiac cath last week:   Elevated troponin could've been from malignant hypertension, coronary spasm, or thrombus. Added plavix load 300 mg PO yesterday, and continue 75 mg PO Plavix daily at discharge. Discussed with patient  Addendum 6:06 PM by Dr. Ramona Luke: See plan regarding Brilinta instead of Plavix below.   Continue to monitor telemetry  S/p PCI/DARRELL for ISR to prox LAD 10/19 performed by Dr. Berkley Curry  Negative NST in 10/2020; 12/19  S/p Cardiac catheterization 3/18/2021; FFR of LAD 0.92, ostial LAD stent 30% stenosis/mild ISR. Continue ASA 81 mg PO daily  Trended troponin, peak 0.88, down to 0.59 no further ACS symptoms overnight  Obtain a CTA chest with and without contrast to r/o aortic aneurysm. -negative  Obtain CT cervical spine to r/o cervical radiculopathy-results as above    Just had Brilinta DC'd on Monday 3/15/2021 per Cardiology as is protocol, cardiac cath was 10/2019, Brilinta onboard 16 months   Continue BB, CCB, Imdur, Statin  PRN SL nitro  O2 as needed-currently on RA  EKG reviewed, no acute changes  GI cocktail to r/o GI cause for recurrent chest pain-patient stated it did not resolve symptoms   Ambulate patient today in hallway to assess for ACS symptoms and record vitals, if ok can discharge with close follow-up     Malignant HTN: resolved, back to baseline   Resume norvasc at 7.5 mg PO daily,Toprol XL 50 mg Po daily, Imdur 30 mg PO daily (did not tolerate higher dose in past)      HLD:  Continue Statin      Chronic Venous Insufficiency:   Follows with Dr. Oleg Edwards  Patient reports she wears compression stockings  Plan is for RF ablation of bilateral GSV            Patient has outpatient follow-up scheduled with Dr. Michael Edwards on April 9th, 2021. If does ok with ambulation, ok for DC this am.     Santi Clinton  DNP,APRN,AG-ACNP-BC     Patient seen and examined by me with the above nurse practitioner. I personally performed all components of the history, physical, and medical decision making and agree with the assessment and plan with minor modifications as noted. Today the patient notes complete resolution of chest discomfort, even with ambulation in the hallways. Peak troponin 0.8. Loaded with Plavix yesterday. General PE  Gen:  NAD  Mental Status - Alert. General Appearance - Not in acute distress. HEENT:  PERRL, no carotid bruits or JVD  Chest and Lung Exam   Inspection: Accessory muscles - No use of accessory muscles in breathing. Auscultation:   Breath sounds: - Normal.   Cardiovascular   Inspection: Jugular vein - Bilateral - Inspection Normal.   Palpation/Percussion:   Apical Impulse: - Normal.   Auscultation: Rhythm - Regular. Heart Sounds - S1 WNL and S2 WNL. No S3 or S4. Murmurs & Other Heart Sounds: Auscultation of the heart reveals - No Murmurs. Peripheral Vascular   Upper Extremity: Inspection - Bilateral - No Cyanotic nailbeds or Digital clubbing. Lower Extremity:   Palpation: Edema - Bilateral - No edema. Abdomen:   Soft, non-tender, bowel sounds are active. Neuro: A&O times 3, CN and motor grossly WNL    Symptoms consistent with acute coronary syndrome. Recent catheterization shows no obstructive lesions. D-dimer negative. CTA negative for dissection. Symptoms began shortly after discontinuing Brilinta. Will restart Brilinta at 90 mg twice daily, to finish the remainder of the 3-month supply that she just recently got. After that, will decrease to 60 mg twice daily. Spoke with Dr. Carlos Olsen who will prescribe sublingual nitroglycerin. Okay for discharge today if she continues to ambulate without any symptoms. The patient knows to go to the ED if any progression of symptoms or symptoms not relieved immediately by rest/ NTG. Follow-up with me in the office within 1 to 2 weeks.

## 2021-03-23 NOTE — PROGRESS NOTES
Transition of Care Plan:    RUR:9%  Disposition: Home  Follow up appointments: PCP-   DME needed: None  Transportation at Discharge:  to transport   [address] or means to access home:     Pt has access to home  IM Medicare letter: n/a  Caregiver Contact: Jeremías Nair- [phone]  Discharge Caregiver contacted prior to discharge? Reason for Admission:  Chest pain r/o ACS                     RUR Score:       9%              Plan for utilizing home health:      No    PCP: First and Last name:  Rey Ta MD     Name of Practice:    Are you a current patient: Yes/No: Yes   Approximate date of last visit: 2/21   Can you participate in a virtual visit with your PCP:                   Yes  Current Advanced Directive/Advance Care Plan: Full Code  Rosalinda 13 (ACP) Conversation      Date of Conversation: 03/23/21  Conducted with: Patient with Lisa 153:     Click here to complete [address] including selection of the [address] Relationship (ie \"Primary\")  Today we documented Decision Maker(s) consistent with Legal Next of Kin hierarchy. Content/Action Overview:   DECLINED ACP conversation - will revisit periodically   Reviewed DNR/DNI and patient elects Full Code (Attempt Resuscitation)         Length of Voluntary ACP Conversation in minutes:  21 minutes    Rosmery Martinez RN                 Healthcare Decision Maker:     Jeremías Nair- [phone]                           Transition of Care Plan:                      Home with family    CM introduced self and role to pt and  in the room. Verified with both,pt demographics, insurance info and emergency contact. Pt lives with  in 2 story home. Pt independent with ADL's, ambulating and driving.    HH/SNF: none  DME: none  [facility], [address] [place] [address] Management Interventions  PCP Verified by CM: Yes  Mode of Transport at Discharge:  Other (see comment)( to provide transportation)  Transition of Care Consult (CM Consult): Discharge Planning  Discharge Durable Medical Equipment: No  Physical Therapy Consult: No  Occupational Therapy Consult: No  Current Support Network: Lives with Spouse  Confirm Follow Up Transport[place] [address] [place] [place] [address]  Phone: [phone]

## 2021-03-23 NOTE — PROGRESS NOTES
Transition of Care Plan:    RUR:9%  Disposition: Home  Follow up appointments: PCP- Dr. Jeremías Daly 04/1/21- 11:45,    DME needed: None  Transportation at Discharge:  to transport   [address] or means to access home:     Pt has access to home  IM Medicare letter: n/a  Caregiver Contact: Jaida Stephany- [phone]  Discharge Caregiver contacted prior to discharge?  at bedside. CM discussed d/c plan with pt and  . Both agreed with the plan. No questions or concern at this time.     [address]  Phone: [phone]

## 2021-03-23 NOTE — PROGRESS NOTES
DISCHARGE SUMMARY FROM [facility] NURSE    The patient is stable for discharge. I have reviewed the discharge instructions with the patient. The patient verbalized understanding. All questions were fully answered. The patient verbalized no complaints. Hard scripts and medication handouts were given and reviewed with the patient. Appropriate educational materials and medication side effects teaching were provided also provided. Cardiac monitor and IV line(s) were removed. The following personal items collected during admission were returned to the patient/family  Home medications: None  Dental Appliance: Dental Appliances: None  Vision: Visual Aid: Glasses  Hearing Aid:    Jewelry: Jewelry: None  Clothing: Clothing: Footwear, Pants, Shirt, Undergarments  Other Valuables: Other Valuables: Cell Phone, Eyeglasses  Valuables sent to safe:      OR _________________________________________________________________________________________    There were no personal belongings, valuables or home medications left at patient's bedside,  or safe.

## 2021-03-23 NOTE — PROGRESS NOTES
Physician Progress Note      PATIENT:               Megan Bonilla  CSN #:                  [id number]  :                       1955  ADMIT DATE:       3/22/2021 11:01 AM  DISCH DATE:  RESPONDING  PROVIDER #:        Jarrod Jones MD          QUERY TEXT:    Dear Hospitalist Team,  Pt admitted with unstable angina with recent cardiac catheterization last week. Pt noted to have elevated troponin's of 0.15, 0.50, 0.88. If possible, please document in progress notes and discharge summary if you are evaluating and/or treating any of the following: The medical record reflects the following:    Risk Factors: 63 Yr F admitted with unstable Angina; recent cardiac catheterization last week without any acute findings of blockage    Clinical Indicators: Patient arrived to the  ED with c/o sudden chest pain that woke her up. Work up in the ED revealed elevated troponin of 0.15, 0.50, 0.88 with a BP of 178/87 on admission. CXR was negative for acute process. EKG on 3/22 revealed Sinus rhythm with premature atrial complexes in a pattern of bigeminy. When compared with ECG of 17-MAR-2021 10:40,  premature atrial complexes are now present. Cardiology was consulted. Per their progress note on 3/23 states, Unstable angina/Hx ASHD with stents/Mildy elevated troponin s/p cardiac cath last week:Elevated troponin could've been from malignant hypertension, coronary spasm, or thrombus. Added plavix load 300 mg PO yesterday, and continue 75 mg PO Plavix daily at discharge. Discussed with patient. Patient is now receiving Plavix 75 Mg PO daily with antihypertensive medications. No further cardiac work up planned. Treatment: BMP, cardiac enzyme marker labs, EKG, Cardiology consult, frequent monitoring/vital signs and Plavix 75 mg PO daily.     Thank you,  Jacky Peck RN, [facility]  [phone]  Options provided:  -- Chest pain due to CAD with unstable angina  -- Chest pain due to HTN  -- Chest pain due to coronary vasospasm  -- Chest pain due to ***  -- Other - I will add my own diagnosis  -- Disagree - Not applicable / Not valid  -- Disagree - Clinically unable to determine / Unknown  -- Refer to Clinical Documentation Reviewer    PROVIDER RESPONSE TEXT:    This patient has chest pain due to coronary vasospasm. Query created by:  Alan Walter on 3/23/2021 11:30 AM      Electronically signed by:  Sherie Atwood MD 3/23/2021 4:18 PM

## 2021-03-23 NOTE — CARDIO/PULMONARY
Cardiac Rehab Note: chart review/referral  
 
Pt is a 62 yo admitted with CAD, chest pain, s/p recent admission with cardiac cath with angiographically normal results, no interventions performed. EF pending. Smoking history assessed. Patient is a non-smoker. Smoking Cessation Program link has not been added to the AVS. Patient not seen at this time due to current condition as indicated in chart. No plans to return to cath lab at this time per chart review.

## 2021-03-23 NOTE — DISCHARGE SUMMARY
Hospitalist Discharge Summary     Patient ID:  Jocelyn Velazquez  [id number]  17 y.o.  1955  3/22/2021    PCP on record: Becky Kirkpatrick MD    Admit date: 3/22/2021  Discharge date and time: 3/23/2021    DISCHARGE DIAGNOSIS:  See below    CONSULTATIONS:  IP CONSULT TO CARDIOLOGY  IP CONSULT TO CARDIOLOGY  IP CONSULT TO HOSPITALIST    Excerpted HPI from H&P of Briana Matos MD:      ______________________________________________________________________  DISCHARGE SUMMARY/HOSPITAL COURSE:  for full details see H&P, daily progress notes, labs, consult notes. Chest pain, ACS ruled out  CAD with hx of PCI   Cardiology eval appreciated - plan is for pt to follow up   Agree that elevated trop was likely secondary to malignant HTN  Will discharge on Norvasc 10mg, Toprol XL 50mg, Imdur 30mg, and ASA  Pt ambulated in the halls this afternoon without any difficulty  Pt stable for discharge home today    Spoke with Dr. Sebas Baum and plan is for pt to resume Brilinta full dose for now, and then be brought down to 60mg BID. Have communicated this with the pt and her . They are aware that she should be on ASA and Brilinta, and not Plavix. Pt already has enough Brilinta tabs at home. HLD  Chronic venous insufficiency  Follow up as outpatient - wear compression stockings      _______________________________________________________________________  Patient seen and examined by me on discharge day. Pertinent Findings:  Gen:    Not in distress  Chest: Clear lungs  CVS:   Regular rhythm. No edema  Abd:  Soft, not distended, not tender  Neuro:  Alert, oriented x3  _______________________________________________________________________  DISCHARGE MEDICATIONS:   Current Discharge Medication List      CONTINUE these medications which have CHANGED    Details   amLODIPine (NORVASC) 10 mg tablet Take 1 Tab by mouth daily.   Qty: 30 Tab, Refills: 0      nitroglycerin (NITROSTAT) 0.4 mg SL tablet 1 Tab by SubLINGual route every five (5) minutes as needed for Chest Pain. Up to 3 doses. Qty: 30 Tab, Refills: 1         CONTINUE these medications which have NOT CHANGED    Details   rosuvastatin (Crestor) 40 mg tablet Take 1 Tab by mouth nightly. Qty: 30 Tab, Refills: 3      isosorbide mononitrate ER (IMDUR) 30 mg tablet TAKE 1 TABLET BY MOUTH EVERY DAY TO PREVENT CHEST PAIN, CALL MD IF SIGNIFICANT HEADACHE  Qty: 90 Tab, Refills: 3      metoprolol succinate (TOPROL-XL) 50 mg XL tablet TAKE 1 TABLET BY MOUTH EVERY DAY      aspirin 81 mg chewable tablet Take 1 Tab by mouth daily. Qty: 30 Tab, Refills: 6         STOP taking these medications       meloxicam (MOBIC) 15 mg tablet Comments:   Reason for Stopping:                 Patient Follow Up Instructions:    Activity: Activity as tolerated  Diet: Cardiac Diet  Wound Care: None needed      Follow-up Information     Follow up With Specialties Details Why Contact Info    Cyril Dang MD Cardiology, [address] [place] [address] Vascular Surgery, Internal Medicine On 4/9/2021 For hospital follow up at 10:15 am  [address] [facility]  [address] Vladimir Alejandro MD Family Medicine On 4/1/2021 For hospital follow up appointment at 11:45AM [address] [facility]   [address] [address] [phone]          ________________________________________________________________    Risk of deterioration: Low    Condition at Discharge:  Stable  __________________________________________________________________    Disposition  Home with family, no needs    ____________________________________________________________________    Code Status: Full Code  ___________________________________________________________________      Total time in minutes spent coordinating this discharge (includes going over instructions, follow-up, prescriptions, and preparing report for sign off to her PCP) :  >30 minutes    Signed:  Chandra Nj MD

## 2021-03-23 NOTE — PROGRESS NOTES
0  Pt ambulated in hallway with no ACS symptoms.      VS before ambulation /73, HR 82   VS after ambulation /63, HR 82

## 2021-03-23 NOTE — PROGRESS NOTES
0730 Bedside shift change report given to [address] (oncoming nurse) by Margaret White (offgoing nurse). Report included the following information SBAR.

## 2021-03-23 NOTE — PROGRESS NOTES
TRANSFER - IN REPORT:    Verbal report received from Shazia Lay RN(name) on Awilda Yusuf  being received from ER(unit) for routine progression of care      Report consisted of patients Situation, Background, Assessment and   Recommendations(SBAR). Information from the following report(s) SBAR was reviewed with the receiving nurse. Opportunity for questions and clarification was provided. Assessment completed upon patients arrival to unit and care assumed.

## 2021-04-09 ENCOUNTER — CLINICAL SUPPORT (OUTPATIENT)
Dept: CARDIOLOGY CLINIC | Age: 66
End: 2021-04-09
Payer: COMMERCIAL

## 2021-04-09 ENCOUNTER — OFFICE VISIT (OUTPATIENT)
Dept: CARDIOLOGY CLINIC | Age: 66
End: 2021-04-09

## 2021-04-09 VITALS
HEIGHT: 63 IN | SYSTOLIC BLOOD PRESSURE: 130 MMHG | RESPIRATION RATE: 16 BRPM | DIASTOLIC BLOOD PRESSURE: 70 MMHG | HEART RATE: 70 BPM | OXYGEN SATURATION: 98 % | WEIGHT: 148.1 LBS | BODY MASS INDEX: 26.24 KG/M2

## 2021-04-09 DIAGNOSIS — E78.2 MIXED HYPERLIPIDEMIA: ICD-10-CM

## 2021-04-09 DIAGNOSIS — I25.10 CORONARY ARTERY DISEASE DUE TO LIPID RICH PLAQUE: ICD-10-CM

## 2021-04-09 DIAGNOSIS — I10 ESSENTIAL HYPERTENSION: ICD-10-CM

## 2021-04-09 DIAGNOSIS — Z98.890 S/P CARDIAC CATH: Primary | ICD-10-CM

## 2021-04-09 DIAGNOSIS — Z09 HOSPITAL DISCHARGE FOLLOW-UP: ICD-10-CM

## 2021-04-09 DIAGNOSIS — I25.83 CORONARY ARTERY DISEASE DUE TO LIPID RICH PLAQUE: ICD-10-CM

## 2021-04-09 DIAGNOSIS — R00.2 HEART PALPITATIONS: Primary | ICD-10-CM

## 2021-04-09 DIAGNOSIS — R00.2 HEART PALPITATIONS: ICD-10-CM

## 2021-04-09 DIAGNOSIS — I87.2 VENOUS INSUFFICIENCY OF BOTH LOWER EXTREMITIES: ICD-10-CM

## 2021-04-09 PROCEDURE — 99214 OFFICE O/P EST MOD 30 MIN: CPT | Performed by: INTERNAL MEDICINE

## 2021-04-09 PROCEDURE — 93270 REMOTE 30 DAY ECG REV/REPORT: CPT | Performed by: INTERNAL MEDICINE

## 2021-04-09 PROCEDURE — 1111F DSCHRG MED/CURRENT MED MERGE: CPT | Performed by: INTERNAL MEDICINE

## 2021-04-09 PROCEDURE — 93272 ECG/REVIEW INTERPRET ONLY: CPT | Performed by: INTERNAL MEDICINE

## 2021-04-09 PROCEDURE — 93000 ELECTROCARDIOGRAM COMPLETE: CPT | Performed by: INTERNAL MEDICINE

## 2021-04-09 RX ORDER — MELOXICAM 15 MG/1
TABLET ORAL
COMMUNITY
Start: 2021-04-04 | End: 2022-05-09 | Stop reason: ALTCHOICE

## 2021-04-09 NOTE — PROGRESS NOTES
[address]  [phone]     Subjective:      Matt Gonzaels is a 63 y.o. female is here for hospital f/u:    Symptoms consistent with acute coronary syndrome. Recent catheterization shows no obstructive lesions. D-dimer negative. CTA negative for dissection. Symptoms began shortly after discontinuing Brilinta. Will restart Brilinta at 90 mg twice daily, to finish the remainder of the 3-month supply that she just recently got. After that, will decrease to 60 mg twice daily. SL NTG prescribed. The patient denies chest pain/ shortness of breath, orthopnea, PND, LE edema, syncope, or presyncope. She is having intermittent palpitations, every few days. Echo 3/23/2021  · LV: Estimated LVEF is 60 - 65%. Normal cavity size and systolic function (ejection fraction normal). Mild concentric hypertrophy. Age-appropriate left ventricular diastolic function. Cardiac catheterization 3/18/2021    Left Main   The vessel is angiographically normal.   Left Anterior Descending   There is mild disease. Previous ostial LAD stent has mild ISR   Ost LAD lesion, 30% stenosed. The lesion was previously treated using a drug-eluting stent. Previous treatment took place >2 years ago. Pressure wire/FFR was performed on the lesion. Flow Reserve: 0.92.    Left Circumflex   The vessel is angiographically normal.   First Obtuse Marginal Branch   The vessel is angiographically normal.   Second Obtuse Marginal Branch   The vessel is angiographically normal.   Right Coronary Artery   The vessel is angiographically normal         Patient Active Problem List    Diagnosis Date Noted    Chest pain 03/22/2021    Acute coronary syndrome ([address] 03/22/2021    S/P cardiac cath 03/18/2021    HTN (hypertension) 03/17/2021    HLD (hyperlipidemia) 03/17/2021    Venous insufficiency of both lower extremities 07/21/2020    Varicose veins of both legs with edema 07/21/2020    CAD (coronary artery disease) 07/21/2020    Unstable angina ([facility] [address] 04/26/2017      Whitney Galeano MD  Past Medical History:   Diagnosis Date    CAD (coronary artery disease)     Hypertension       Past Surgical History:   Procedure Laterality Date    HX CORONARY STENT PLACEMENT  04/26/2017    HX GYN      vaginal wall repair    HX OTHER SURGICAL      surgery for fissure    AZ BREAST SURGERY PROCEDURE UNLISTED      cystectomy x2     Allergies   Allergen Reactions    Codeine Nausea and Vomiting      Family History   Problem Relation Age of Onset    Heart Disease Mother     Heart Disease Father       Social History     Socioeconomic History    Marital status:      Spouse name: Not on file    Number of children: Not on file    Years of education: Not on file    Highest education level: Not on file   Occupational History    Not on file   Social Needs    Financial resource strain: Not on file    Food insecurity     Worry: Not on file     Inability: Not on file    Transportation needs     Medical: Not on file     Non-medical: Not on file   Tobacco Use    Smoking status: Never Smoker    Smokeless tobacco: Never Used   Substance and Sexual Activity    Alcohol use: Yes     Frequency: 4 or more times a week     Comment: 5-6 beers a week    Drug use: No    Sexual activity: Never   Lifestyle    Physical activity     Days per week: Not on file     Minutes per session: Not on file    Stress: Not on file   Relationships    Social connections     Talks on phone: Not on file     Gets together: Not on file     Attends Judaism service: Not on file     Active member of club or organization: Not on file     Attends meetings of clubs or organizations: Not on file     Relationship status: Not on file    Intimate partner violence     Fear of current or ex partner: Not on file     Emotionally abused: Not on file     Physically abused: Not on file     Forced sexual activity: Not on file   Other Topics Concern  Not on file   Social History Narrative    Not on file      Current Outpatient Medications   Medication Sig    meloxicam (MOBIC) 15 mg tablet TAKE 1 TABLET BY MOUTH EVERY DAY AS NEEDED FOR ARTHRITIS ORALLY 30 DAY(S)    ticagrelor (Brilinta) 90 mg tablet Take  by mouth two (2) times a day.  amLODIPine (NORVASC) 10 mg tablet Take 1 Tab by mouth daily.  nitroglycerin (NITROSTAT) 0.4 mg SL tablet 1 Tab by SubLINGual route every five (5) minutes as needed for Chest Pain. Up to 3 doses.  rosuvastatin (Crestor) 40 mg tablet Take 1 Tab by mouth nightly.  isosorbide mononitrate ER (IMDUR) 30 mg tablet TAKE 1 TABLET BY MOUTH EVERY DAY TO PREVENT CHEST PAIN, CALL MD IF SIGNIFICANT HEADACHE    metoprolol succinate (TOPROL-XL) 50 mg XL tablet TAKE 1 TABLET BY MOUTH EVERY DAY    aspirin 81 mg chewable tablet Take 1 Tab by mouth daily. No current facility-administered medications for this visit. Review of Symptoms:  11 systems reviewed, negative other than as stated in the HPI    Physical ExamPhysical Exam:    Vitals:    04/09/21 1038   BP: 130/70   Pulse: 70   Resp: 16   SpO2: 98%   Weight: 148 lb 1.6 oz (67.2 kg)   Height: 5' 3\" (1.6 m)     Body mass index is 26.23 kg/m². General PE  Gen:  NAD  Mental Status - Alert. General Appearance - Not in acute distress. HEENT:  PERRL, no carotid bruits or JVD  Chest and Lung Exam   Inspection: Accessory muscles - No use of accessory muscles in breathing. Auscultation:   Breath sounds: - Normal.   Cardiovascular   Inspection: Jugular vein - Bilateral - Inspection Normal.   Palpation/Percussion:   Apical Impulse: - Normal.   Auscultation: Rhythm - Regular. Heart Sounds - S1 WNL and S2 WNL. No S3 or S4. Murmurs & Other Heart Sounds: Auscultation of the heart reveals - No Murmurs. Peripheral Vascular   Upper Extremity: Inspection - Bilateral - No Cyanotic nailbeds or Digital clubbing.    Lower Extremity:   Palpation: Edema - Bilateral - No edema. Abdomen:   Soft, non-tender, bowel sounds are active. Neuro: A&O times 3, CN and motor grossly WNL    Labs:   Lab Results   Component Value Date/Time    Cholesterol, total 179 03/09/2021 09:23 AM    Cholesterol, total 208 (H) 04/27/2017 03:41 AM    HDL Cholesterol 56 03/09/2021 09:23 AM    HDL Cholesterol 51 04/27/2017 03:41 AM    LDL, calculated 90 03/09/2021 09:23 AM    LDL, calculated 125.6 (H) 04/27/2017 03:41 AM    Triglyceride 198 (H) 03/09/2021 09:23 AM    Triglyceride 157 (H) 04/27/2017 03:41 AM    CHOL/HDL Ratio 4.1 04/27/2017 03:41 AM     Lab Results   Component Value Date/Time    CK 86 04/26/2017 08:30 AM     Lab Results   Component Value Date/Time    Sodium 135 (L) 03/23/2021 03:34 AM    Potassium 3.8 03/23/2021 03:34 AM    Chloride 103 03/23/2021 03:34 AM    CO2 25 03/23/2021 03:34 AM    Anion gap 7 03/23/2021 03:34 AM    Glucose 117 (H) 03/23/2021 03:34 AM    BUN 18 03/23/2021 03:34 AM    Creatinine 0.58 03/23/2021 03:34 AM    BUN/Creatinine ratio 31 (H) 03/23/2021 03:34 AM    GFR est AA >60 03/23/2021 03:34 AM    GFR est non-AA >60 03/23/2021 03:34 AM    Calcium 8.7 03/23/2021 03:34 AM    Bilirubin, total 0.4 03/22/2021 10:22 AM    Alk. phosphatase 96 03/22/2021 10:22 AM    Protein, total 7.8 03/22/2021 10:22 AM    Albumin 4.2 03/22/2021 10:22 AM    Globulin 3.6 03/22/2021 10:22 AM    A-G Ratio 1.2 03/22/2021 10:22 AM    ALT (SGPT) 59 03/22/2021 10:22 AM       EKG:  NSR     Assessment:        1. S/P cardiac cath    2. Essential hypertension    3. Mixed hyperlipidemia    4. Venous insufficiency of both lower extremities    5. Coronary artery disease due to lipid rich plaque    6. Hospital discharge follow-up    7.  Heart palpitations        Orders Placed This Encounter    REFERRAL TO CARDIAC REHAB NSTEMI     Referral Priority:   Routine     Referral Type:   Consultation     Referral Reason:   Specialty Services Required     Referred to Provider:   Aye Jimenez MD     Number of Visits Requested:   1    CO DISCHARGE MEDS RECONCILED W/ CURRENT OUTPATIENT MED LIST    AMB POC EKG ROUTINE W/ 12 LEADS, INTER & REP     Order Specific Question:   Reason for Exam:     Answer:   routine    ECG EVENT RECORD MONITORING SET-UP     Standing Status:   Future     Standing Expiration Date:   4/9/2022     Order Specific Question:   Reason for Exam:     Answer:   r/o AF    meloxicam (MOBIC) 15 mg tablet     Sig: TAKE 1 TABLET BY MOUTH EVERY DAY AS NEEDED FOR ARTHRITIS ORALLY 30 DAY(S)    ticagrelor (Brilinta) 90 mg tablet     Sig: Take  by mouth two (2) times a day. Plan:     Cindy Perez is a 63 y.o. female is here for hospital f/u:    ASHD/ ACS 3/2021:  Symptoms consistent with acute coronary syndrome. Recent catheterization shows no obstructive lesions. D-dimer negative. CTA negative for dissection. Symptoms began shortly after discontinuing Brilinta. Will restart Brilinta at 90 mg twice daily, to finish the remainder of the 3-month supply that she just recently got. After that, will decrease to 60 mg twice daily. SL NTG prescribed. S/p Cardiac catheterization 3/18/2021; FFR of LAD 0.92, ostial LAD stent 30% stenosis/mild ISR.   S/p PCI/DARRELL for ISR to prox LAD 10/19 performed by DR Rankin et al  Negative NST in 10/2020; 12/19  Continue ASA dc brilinta   Continue BB CCB Imdur statin  Completed cardiac rehab 15/36 sessionsshe is walking 3 miles daily  Not completed. Will refer back. Recall: did not tolerate higher dose of Imdur, doing fine with 30 mg    Heart palpitations occurring less frequently than every 48 hours, previous spells of dizziness improving:  Check event monitor.     Normal EF no significant valve issues per echo in 3/23/2021    HTN  Controlled with current therapy         HLD  3/2021 LDL 90 we switched from atorvastatin to rosuvastatin  Has lab slip for  repeat labs in 3 mos     Venous insufficiency  Followed by Dr Yahaira Low, seen 2/2021  Plan is for RF ablation of bilateral GSV      Hx dizziness, resolving  She will call us for recurrent symptom     Counseled on diet and exercise- eventual goal of 30-60 minutes 5-7 times a week as per AHA guidelines. She is doing great, walking 3 miles daily without any exertional symptoms.       Continue current care and f/u in 6-12 months if testing OK. Please note that the patient was seen by myself without the nurse practitioner today.     Cintia Marsh MD

## 2021-04-09 NOTE — PROGRESS NOTES
Chief Complaint   Patient presents with   [facility] Follow Up    Hypertension    Cholesterol Problem    Coronary Artery Disease     1. Have you been to the ER, urgent care clinic since your last visit? Yes 3/22/21 chest pain Hospitalized since your last visit? 3/18/21 left heart cath. 2. Have you seen or consulted any other health care providers outside of the [address] [place] [address] since your last visit? No  Include any pap smears or colon screening. No    Patient C/O chest pain relieved with 2 nitro each time.

## 2021-04-09 NOTE — LETTER
4/25/2021 Patient: Cindy Perez YOB: 1955 Date of Visit: 4/9/2021 Niles Newton MD 
[address] 
[address] [address] Via Fax: [phone] Dear Niles Newton MD, Thank you for referring Ms. Cindy Perez to [facility] for evaluation. My notes for this consultation are attached. If you have questions, please do not hesitate to call me. I look forward to following your patient along with you.  
 
 
Sincerely, 
 
Jayden Marcelo MD

## 2021-05-23 NOTE — PROGRESS NOTES
Please advise the patient had no arrhythmias. Just rare early heartbeats from the upper or lower chambers of the heart which we all have and are not dangerous or concerning. If doing well, proceed with healthy diet and exercise, and follow-up in 1 year.

## 2021-05-24 ENCOUNTER — TELEPHONE (OUTPATIENT)
Dept: CARDIOLOGY CLINIC | Age: 66
End: 2021-05-24

## 2021-07-06 RX ORDER — AMLODIPINE BESYLATE 10 MG/1
10 TABLET ORAL DAILY
Qty: 90 TABLET | Refills: 1 | Status: SHIPPED | OUTPATIENT
Start: 2021-07-06 | End: 2022-01-06

## 2021-07-12 ENCOUNTER — HOSPITAL ENCOUNTER (OUTPATIENT)
Dept: CARDIAC REHAB | Age: 66
Discharge: HOME | End: 2021-07-12
Payer: COMMERCIAL

## 2021-07-12 VITALS — WEIGHT: 149.2 LBS | HEIGHT: 63 IN | BODY MASS INDEX: 26.44 KG/M2

## 2021-07-12 PROCEDURE — 93798 PHYS/QHP OP CAR RHAB W/ECG: CPT

## 2021-07-12 NOTE — PROGRESS NOTES
Cardiopulmonary Rehab Orientation:      Met with Mrs. Anamaria Keller for the initial session. Mrs. Anamaria Keller  is a 63year-old patient of Dr. Roark Bumpers who presents to rehab for cardiac conditioning and strengthening, S/P NSTEMI. History also includes HTN     Allergies include codeine     Immunization for covid vaccine is up to date     Pt is nonsmoker      Lungs were CTA; has no cough   No LE edema was present. Exercise at home includes daily 3 mile walk with dogs, lasting about 40 minutes  Limitations: occasional right hip pain     Patient was given an educational notebook. Psychosocial: PHQ9 score of 4. Mrs. Anamaria Keller lives at home with her  and two dogs. BMI 26.4, based on height of 5'3\" and weight of 149.2 lb  Tele SR/ST/occasional PACs  Pt completed 6MW without difficulty with 460 meters, 4.0 METS         Patient's identified goals are:   1. loose 10-15lbs by end of program  2.  Meet with dietician to learn [address] and how to help lower LDL cholesterol        Plan: Return for first exercise session on 7/13/21

## 2021-07-14 ENCOUNTER — HOSPITAL ENCOUNTER (OUTPATIENT)
Dept: CARDIAC REHAB | Age: 66
Discharge: HOME | End: 2021-07-14
Payer: COMMERCIAL

## 2021-07-14 VITALS — WEIGHT: 149.4 LBS | BODY MASS INDEX: 26.47 KG/M2

## 2021-07-14 PROCEDURE — 93798 PHYS/QHP OP CAR RHAB W/ECG: CPT

## 2021-07-16 ENCOUNTER — HOSPITAL ENCOUNTER (OUTPATIENT)
Dept: CARDIAC REHAB | Age: 66
Discharge: HOME | End: 2021-07-16
Payer: COMMERCIAL

## 2021-07-16 VITALS — BODY MASS INDEX: 26.43 KG/M2 | WEIGHT: 149.2 LBS

## 2021-07-16 PROCEDURE — 93798 PHYS/QHP OP CAR RHAB W/ECG: CPT

## 2021-07-20 ENCOUNTER — HOSPITAL ENCOUNTER (OUTPATIENT)
Dept: CARDIAC REHAB | Age: 66
Discharge: HOME | End: 2021-07-20
Payer: COMMERCIAL

## 2021-07-20 VITALS — BODY MASS INDEX: 26.43 KG/M2 | WEIGHT: 149.2 LBS

## 2021-07-20 PROCEDURE — 93798 PHYS/QHP OP CAR RHAB W/ECG: CPT

## 2021-07-21 ENCOUNTER — HOSPITAL ENCOUNTER (OUTPATIENT)
Dept: CARDIAC REHAB | Age: 66
Discharge: HOME | End: 2021-07-21
Payer: COMMERCIAL

## 2021-07-21 VITALS — WEIGHT: 149.2 LBS | BODY MASS INDEX: 26.43 KG/M2

## 2021-07-21 PROCEDURE — 93798 PHYS/QHP OP CAR RHAB W/ECG: CPT

## 2021-07-21 PROCEDURE — 93797 PHYS/QHP OP CAR RHAB WO ECG: CPT

## 2021-07-21 NOTE — CARDIO/PULMONARY
Cardiac Rehab Nutrition Assessment - 1:1 Evaluation       NAME: Roberto Briones : 1955 AGE: 63 y.o. GENDER: female  CARDIAC REHAB ADMITTING DIAGNOSIS: S/p NSTEMI    Relevant comorbidities: HTN    PROBLEM LIST:  Patient Active Problem List   Diagnosis Code    Unstable angina (HCC) I20.0    Venous insufficiency of both lower extremities I87.2    Varicose veins of both legs with edema I83.893    CAD (coronary artery disease) I25.10    HTN (hypertension) I10    HLD (hyperlipidemia) E78.5    S/P cardiac cath Z98.890    Chest pain R07.9    Acute coronary syndrome ([facility] [address] I24.9       LABS:   No results found for: HBA1C, ZRE0PSKV, ZCZ0LFMP  Lab Results   Component Value Date/Time    Cholesterol, total 179 2021 09:23 AM    HDL Cholesterol 56 2021 09:23 AM    LDL, calculated 90 2021 09:23 AM    LDL, calculated 125.6 (H) 2017 03:41 AM    VLDL, calculated 33 2021 09:23 AM    VLDL, calculated 31.4 2017 03:41 AM    Triglyceride 198 (H) 2021 09:23 AM    CHOL/HDL Ratio 4.1 2017 03:41 AM       MEDICATIONS/SUPPLEMENTS:   Prior to Admission medications    Medication Sig Start Date End Date Taking? Authorizing Provider   amLODIPine (NORVASC) 10 mg tablet Take 1 Tablet by mouth daily. 21   Maximiliano Andrew MD   rosuvastatin (CRESTOR) 40 mg tablet TAKE 1 TABLET BY MOUTH EVERY DAY AT NIGHT 21   Maximiliano Andrew MD   meloxicam (MOBIC) 15 mg tablet TAKE 1 TABLET BY MOUTH EVERY DAY AS NEEDED FOR ARTHRITIS ORALLY 30 DAY(S) 21   Provider, Historical   ticagrelor (Brilinta) 90 mg tablet Take  by mouth two (2) times a day. Provider, Historical   nitroglycerin (NITROSTAT) 0.4 mg SL tablet 1 Tab by SubLINGual route every five (5) minutes as needed for Chest Pain. Up to 3 doses.  3/23/21   Isabel Longoria MD   isosorbide mononitrate ER (IMDUR) 30 mg tablet TAKE 1 TABLET BY MOUTH EVERY DAY TO PREVENT CHEST PAIN, CALL MD IF SIGNIFICANT HEADACHE 20   Bhavna Zeynep Mata MD   metoprolol succinate (TOPROL-XL) 50 mg XL tablet TAKE 1 TABLET BY MOUTH EVERY DAY 6/4/20   Provider, Historical   aspirin 81 mg chewable tablet Take 1 Tab by mouth daily. 4/27/17   Carin Rankin MD       ANTHROPOMETRICS:    Ht Readings from Last 1 Encounters:   07/12/21 5' 3\" (1.6 m)      Wt Readings from Last 10 Encounters:   07/20/21 67.7 kg (149 lb 3.2 oz)   07/16/21 67.7 kg (149 lb 3.2 oz)   07/14/21 67.8 kg (149 lb 6.4 oz)   07/12/21 67.7 kg (149 lb 3.2 oz)   04/09/21 67.2 kg (148 lb 1.6 oz)   03/23/21 66.7 kg (147 lb)   03/18/21 65.8 kg (145 lb)   03/15/21 67.7 kg (149 lb 4.8 oz)   02/16/21 66.2 kg (146 lb)   10/28/20 67.1 kg (148 lb)      IBW:115 # +/- 10%  %IBW: 130 % +/- 10%    BMI: 26.43 kg/M2 Category: overweight   Waist: 39 inches    Reported Wt Hx: Currently at the heaviest weight she has ever been; tried weight watchers in the past and was able to lose some weight. Reported Diet Hx:  is a marathon runner, so eats very healthily and does not allow \"junk\" food in the house. Pt reports she will sometimes have \"cheat\" meals because she gets tired of constantly eating healthy all the time. Cheat meals typically consist of fried foods and steak - pt notes this is a rare occurrence. Rate Your Plate Score: 64  (Score 58-72: Making many healthy choices; 41-57: Some choices need improving 24-40: many choices need improving)    24 HOUR DIET RECALL  Breakfast Coffee first thing in the morning; 2 Aussie bites   Lunch Big caesar salad with lettuce, tomato, red onion, cucumber, green onion, grilled chicken; raspberry walnut vinaigrette    Dinner Pasta with fresh tomato sauce    Snacks Fresh cantaloupe and peaches   Beverages Water, sometimes craft beer (1-2)     Roberto Briones     Environmental/Social: Lives at home with  and 2 dogs; Walks dogs 3 miles daily (~40 minutes) for exercise.        NUTRITION INTERVENTION:  Nutrition 60 minute one-on-one education & goal setting with Rossana Herrera    Reviewed with Rossana Herrera relevant labs compared to ideals. Reviewed weight history and patient's verbalized weight goal as well as any real or perceived barriers to obtaining the goal. Collaborated with patient to set a specific short and long term weight goal.     Reviewed Rate Your Plate and conducted a verbal diet recall. Assessed for environmental, financial, psychosocial, physical and comorbidities that may impact the food and eating patterns / behaviors of Rossana Herrera    Collaborated with patient to set specific nutrient goals as well as specific food / behavior changes that will help patient meet the overall goal of following a heart healthy eating pattern (using guidelines as set forth by the American Heart Association and modeled after healthful eating patterns as recognized by the USDA Dietary Guidelines such as DASH, Mediterranean or plant-based). Briefly reviewed with Rossana Herrera the nutrition information in the Cardiac Rehab patient education book and encouraged Rossana Herrera to read thoroughly, ask questions as needed, and use for future reference for heart healthy nutrition information. Rossana Herrera is not scheduled to participate in Cardiac Rehab group nutrition classes. PATIENT GOALS:    Weight Goals: lose 10-15# by end of the program    Short Term Weight Goal: 135-140 lbs  Long Term Weight Goal:130 lbs    Nutrition Goals:  Daily Recommendations:  Calories: ~1500 kcal /day for wt loss  (using Alveda Malik with AF 1.4-1.5)    Saturated Fat: no more than 10 g/day  Trans Fat: 0 g/day  Sodium: no more than 1500 mg/day  Fruit: 2 cups / day  Vegetables: 2-3 cups/day    Other:  - read and compare food labels  -watch portion sizes  - increase healthy fats (avocado, walnuts, flaxseed)    Keeping a food diary was recommended. Questions addressed. Follow-up plans discussed. Rossana Herrera verbalized understanding.             Jose Rojas RD

## 2021-07-23 ENCOUNTER — HOSPITAL ENCOUNTER (OUTPATIENT)
Dept: CARDIAC REHAB | Age: 66
Discharge: HOME | End: 2021-07-23
Payer: COMMERCIAL

## 2021-07-23 VITALS — BODY MASS INDEX: 26.39 KG/M2 | WEIGHT: 149 LBS

## 2021-07-23 PROCEDURE — 93798 PHYS/QHP OP CAR RHAB W/ECG: CPT

## 2021-07-27 ENCOUNTER — HOSPITAL ENCOUNTER (OUTPATIENT)
Dept: CARDIAC REHAB | Age: 66
Discharge: HOME | End: 2021-07-27
Payer: COMMERCIAL

## 2021-07-27 VITALS — BODY MASS INDEX: 26.32 KG/M2 | WEIGHT: 148.6 LBS

## 2021-07-27 PROCEDURE — 93798 PHYS/QHP OP CAR RHAB W/ECG: CPT

## 2021-07-28 ENCOUNTER — HOSPITAL ENCOUNTER (OUTPATIENT)
Dept: CARDIAC REHAB | Age: 66
Discharge: HOME | End: 2021-07-28
Payer: COMMERCIAL

## 2021-07-28 VITALS — WEIGHT: 148.2 LBS | BODY MASS INDEX: 26.25 KG/M2

## 2021-07-28 PROCEDURE — 93798 PHYS/QHP OP CAR RHAB W/ECG: CPT

## 2021-07-30 ENCOUNTER — HOSPITAL ENCOUNTER (OUTPATIENT)
Dept: CARDIAC REHAB | Age: 66
Discharge: HOME | End: 2021-07-30
Payer: COMMERCIAL

## 2021-07-30 VITALS — BODY MASS INDEX: 26.32 KG/M2 | WEIGHT: 148.6 LBS

## 2021-07-30 PROCEDURE — 93798 PHYS/QHP OP CAR RHAB W/ECG: CPT

## 2021-08-03 ENCOUNTER — HOSPITAL ENCOUNTER (OUTPATIENT)
Dept: CARDIAC REHAB | Age: 66
Discharge: HOME | End: 2021-08-03
Payer: COMMERCIAL

## 2021-08-03 VITALS — WEIGHT: 148.6 LBS | BODY MASS INDEX: 26.32 KG/M2

## 2021-08-03 PROCEDURE — 93798 PHYS/QHP OP CAR RHAB W/ECG: CPT

## 2021-08-04 ENCOUNTER — HOSPITAL ENCOUNTER (OUTPATIENT)
Dept: CARDIAC REHAB | Age: 66
Discharge: HOME | End: 2021-08-04
Payer: COMMERCIAL

## 2021-08-04 VITALS — BODY MASS INDEX: 26.18 KG/M2 | WEIGHT: 147.8 LBS

## 2021-08-04 PROCEDURE — 93798 PHYS/QHP OP CAR RHAB W/ECG: CPT

## 2021-08-06 ENCOUNTER — HOSPITAL ENCOUNTER (OUTPATIENT)
Dept: CARDIAC REHAB | Age: 66
Discharge: HOME | End: 2021-08-06
Payer: COMMERCIAL

## 2021-08-06 VITALS — WEIGHT: 148.2 LBS | BODY MASS INDEX: 26.25 KG/M2

## 2021-08-06 PROCEDURE — 93798 PHYS/QHP OP CAR RHAB W/ECG: CPT

## 2021-08-10 ENCOUNTER — APPOINTMENT (OUTPATIENT)
Dept: CARDIAC REHAB | Age: 66
End: 2021-08-10
Payer: COMMERCIAL

## 2021-08-11 ENCOUNTER — APPOINTMENT (OUTPATIENT)
Dept: CARDIAC REHAB | Age: 66
End: 2021-08-11
Payer: COMMERCIAL

## 2021-08-13 ENCOUNTER — APPOINTMENT (OUTPATIENT)
Dept: CARDIAC REHAB | Age: 66
End: 2021-08-13
Payer: COMMERCIAL

## 2021-08-17 ENCOUNTER — HOSPITAL ENCOUNTER (OUTPATIENT)
Dept: CARDIAC REHAB | Age: 66
Discharge: HOME | End: 2021-08-17
Payer: COMMERCIAL

## 2021-08-17 VITALS — BODY MASS INDEX: 26.32 KG/M2 | WEIGHT: 148.6 LBS

## 2021-08-17 PROCEDURE — 93798 PHYS/QHP OP CAR RHAB W/ECG: CPT

## 2021-08-18 ENCOUNTER — HOSPITAL ENCOUNTER (OUTPATIENT)
Dept: CARDIAC REHAB | Age: 66
Discharge: HOME | End: 2021-08-18
Payer: COMMERCIAL

## 2021-08-18 VITALS — BODY MASS INDEX: 26.32 KG/M2 | WEIGHT: 148.6 LBS

## 2021-08-18 PROCEDURE — 93798 PHYS/QHP OP CAR RHAB W/ECG: CPT

## 2021-08-20 ENCOUNTER — HOSPITAL ENCOUNTER (OUTPATIENT)
Dept: CARDIAC REHAB | Age: 66
Discharge: HOME | End: 2021-08-20
Payer: COMMERCIAL

## 2021-08-20 VITALS — WEIGHT: 148.4 LBS | BODY MASS INDEX: 26.29 KG/M2

## 2021-08-20 PROCEDURE — 93798 PHYS/QHP OP CAR RHAB W/ECG: CPT

## 2021-08-24 ENCOUNTER — HOSPITAL ENCOUNTER (OUTPATIENT)
Dept: CARDIAC REHAB | Age: 66
Discharge: HOME | End: 2021-08-24
Payer: COMMERCIAL

## 2021-08-24 VITALS — WEIGHT: 148.4 LBS | BODY MASS INDEX: 26.29 KG/M2

## 2021-08-24 PROCEDURE — 93798 PHYS/QHP OP CAR RHAB W/ECG: CPT

## 2021-08-25 ENCOUNTER — APPOINTMENT (OUTPATIENT)
Dept: CARDIAC REHAB | Age: 66
End: 2021-08-25
Payer: COMMERCIAL

## 2021-08-27 ENCOUNTER — HOSPITAL ENCOUNTER (OUTPATIENT)
Dept: CARDIAC REHAB | Age: 66
Discharge: HOME | End: 2021-08-27
Payer: COMMERCIAL

## 2021-08-27 VITALS — WEIGHT: 147.4 LBS | BODY MASS INDEX: 26.11 KG/M2

## 2021-08-27 PROCEDURE — 93798 PHYS/QHP OP CAR RHAB W/ECG: CPT

## 2021-08-31 ENCOUNTER — HOSPITAL ENCOUNTER (OUTPATIENT)
Dept: CARDIAC REHAB | Age: 66
Discharge: HOME | End: 2021-08-31
Payer: COMMERCIAL

## 2021-08-31 VITALS — BODY MASS INDEX: 26.15 KG/M2 | WEIGHT: 147.6 LBS

## 2021-08-31 PROCEDURE — 93798 PHYS/QHP OP CAR RHAB W/ECG: CPT

## 2021-09-01 ENCOUNTER — HOSPITAL ENCOUNTER (OUTPATIENT)
Dept: CARDIAC REHAB | Age: 66
Discharge: HOME | End: 2021-09-01
Payer: COMMERCIAL

## 2021-09-01 VITALS — BODY MASS INDEX: 26.25 KG/M2 | WEIGHT: 148.2 LBS

## 2021-09-01 PROCEDURE — 93798 PHYS/QHP OP CAR RHAB W/ECG: CPT

## 2021-09-03 ENCOUNTER — HOSPITAL ENCOUNTER (OUTPATIENT)
Dept: CARDIAC REHAB | Age: 66
Discharge: HOME | End: 2021-09-03
Payer: COMMERCIAL

## 2021-09-03 VITALS — BODY MASS INDEX: 26.22 KG/M2 | WEIGHT: 148 LBS

## 2021-09-03 PROCEDURE — 93798 PHYS/QHP OP CAR RHAB W/ECG: CPT

## 2021-09-07 ENCOUNTER — HOSPITAL ENCOUNTER (OUTPATIENT)
Dept: CARDIAC REHAB | Age: 66
Discharge: HOME | End: 2021-09-07
Payer: COMMERCIAL

## 2021-09-07 VITALS — WEIGHT: 148 LBS | BODY MASS INDEX: 26.22 KG/M2

## 2021-09-07 PROCEDURE — 93798 PHYS/QHP OP CAR RHAB W/ECG: CPT

## 2021-09-08 ENCOUNTER — HOSPITAL ENCOUNTER (OUTPATIENT)
Dept: CARDIAC REHAB | Age: 66
Discharge: HOME | End: 2021-09-08
Payer: COMMERCIAL

## 2021-09-08 VITALS — WEIGHT: 148.2 LBS | BODY MASS INDEX: 26.25 KG/M2

## 2021-09-08 PROCEDURE — 93798 PHYS/QHP OP CAR RHAB W/ECG: CPT

## 2021-09-10 ENCOUNTER — HOSPITAL ENCOUNTER (OUTPATIENT)
Dept: CARDIAC REHAB | Age: 66
Discharge: HOME | End: 2021-09-10
Payer: COMMERCIAL

## 2021-09-10 VITALS — WEIGHT: 148.6 LBS | BODY MASS INDEX: 26.32 KG/M2

## 2021-09-10 PROCEDURE — 93798 PHYS/QHP OP CAR RHAB W/ECG: CPT

## 2021-09-13 ENCOUNTER — CLINICAL SUPPORT (OUTPATIENT)
Dept: CARDIOLOGY CLINIC | Age: 66
End: 2021-09-13
Payer: COMMERCIAL

## 2021-09-13 VITALS
BODY MASS INDEX: 26.22 KG/M2 | SYSTOLIC BLOOD PRESSURE: 140 MMHG | DIASTOLIC BLOOD PRESSURE: 70 MMHG | HEART RATE: 78 BPM | HEIGHT: 63 IN | OXYGEN SATURATION: 98 % | WEIGHT: 148 LBS

## 2021-09-13 DIAGNOSIS — I87.2 VENOUS INSUFFICIENCY OF BOTH LOWER EXTREMITIES: ICD-10-CM

## 2021-09-13 DIAGNOSIS — I83.893 VARICOSE VEINS OF BOTH LEGS WITH EDEMA: Primary | ICD-10-CM

## 2021-09-13 PROCEDURE — 96372 THER/PROPH/DIAG INJ SC/IM: CPT | Performed by: INTERNAL MEDICINE

## 2021-09-13 PROCEDURE — 36475 ENDOVENOUS RF 1ST VEIN: CPT | Performed by: INTERNAL MEDICINE

## 2021-09-13 NOTE — PROGRESS NOTES
[facility]    Patient: Yao Harris  : 1955  Chart no: [id number]  DATE OF SERVICE: 2021    Procedure: Endovenous radiofrequency ablation of the right greater saphenous vein (s) of the lower extremity  Anesthesia: Local infiltration  Estimated blood loss: minimal  Specimen: none. Tumescent vol: 500 ml    OPERATIVE NOTE     The patient was transferred to the procedure suite and the insufficient saphenous vein was mapped by ultrasound and diagrammed on the overlying skin. The depth and diameter of the vein(s) to be treated was documented. The varicose tributary veins and suitable access sites were identified and mapped as well. The patient was then positioned supine on the procedure table. The affected limb was prepped and draped in the usual sterile fashion. The RF catheter was placed on the sterile field, flushed and wiped down, prepared, and connected by a sterile cable. The patient was placed in reverse-Trendelenburg position and local anesthesia was instilled in the skin overlying the access site. The vein was accessed using ultrasound guidance and the Seldinger technique, a guide wire was introduced through the needle, which was then exchanged over the guide wire for a 7F sheath, which was secured in place. The guide wire was removed and the sheath was flushed. The RF catheter was placed into the vein through the sheath and Preferentially, imaging was used to place the catheter tip just inferior to the superficial epigastric vein to preserve normal physiological flow in that vein. Additionally, it was confirmed by ultrasound guidance that the catheter tip was also placed a minimum of 2cm distal to the saphenofemoral junction.     After the RF catheter position was verified by ultrasound, tumescent anesthesia was infiltrated, under ultrasound guidance, precisely into the perivenous compartment along the entire length of vein from the entry site to the saphenofemoral junction until a \"halo\" of fluid was noted around the vein. The patient was then placed in Trendelenburg position to further exsanguinate the superficial venous system. After RF catheter position was again confirmed with ultrasound imaging, and under direct external compression along the length of the heating element, RF energy was applied. The vein was segmentally ablated by heating a 7 cm segment and then indexing the catheter forward by 6.5 cm until the treatment length is completed. Device temperature was maintained at 120±5 ºC with an initial power level of 40W dropping to below 20W for each treatment. Total vein length treated 35 cm Total cycles of RF 7 for 2 minutes 20 seconds. Repeat ultrasound of the saphenous vein was performed, confirming successful treatment. The catheter and sheath were withdrawn and hemostasis established with direct pressure. After assuring hemostasis, the skin incision over the saphenous vein was closed with a bandage and a compression wrap, and/or graduated compression stocking was applied from the level of the foot to the most proximal level of the thigh. Patient tolerated procedure very well.        Femi Gonzalez MD, Jacob Neri, [address] [facility]   [address] [place] [address] [address] [facility]  [phone]

## 2021-09-14 ENCOUNTER — APPOINTMENT (OUTPATIENT)
Dept: CARDIAC REHAB | Age: 66
End: 2021-09-14
Payer: COMMERCIAL

## 2021-09-15 ENCOUNTER — ANCILLARY PROCEDURE (OUTPATIENT)
Dept: CARDIOLOGY CLINIC | Age: 66
End: 2021-09-15
Payer: COMMERCIAL

## 2021-09-15 ENCOUNTER — APPOINTMENT (OUTPATIENT)
Dept: CARDIAC REHAB | Age: 66
End: 2021-09-15
Payer: COMMERCIAL

## 2021-09-15 ENCOUNTER — TELEPHONE (OUTPATIENT)
Dept: CARDIAC REHAB | Age: 66
End: 2021-09-15

## 2021-09-15 DIAGNOSIS — Z98.890 STATUS POST ENDOVENOUS RADIOFREQUENCY ABLATION (RFA) OF SAPHENOUS VEIN: ICD-10-CM

## 2021-09-15 PROCEDURE — 93971 EXTREMITY STUDY: CPT | Performed by: INTERNAL MEDICINE

## 2021-09-15 NOTE — TELEPHONE ENCOUNTER
Cardiopulmonary Rehab Nursing Entry:    Pt called to report that she had swelling at her recent site of procedure and has been told by cardiologist to take today off from Cardiac Rehab.

## 2021-09-17 ENCOUNTER — HOSPITAL ENCOUNTER (OUTPATIENT)
Dept: CARDIAC REHAB | Age: 66
Discharge: HOME | End: 2021-09-17
Payer: COMMERCIAL

## 2021-09-17 VITALS — BODY MASS INDEX: 26.29 KG/M2 | WEIGHT: 148.4 LBS

## 2021-09-17 PROCEDURE — 93798 PHYS/QHP OP CAR RHAB W/ECG: CPT

## 2021-09-22 ENCOUNTER — HOSPITAL ENCOUNTER (OUTPATIENT)
Dept: CARDIAC REHAB | Age: 66
Discharge: HOME | End: 2021-09-22
Payer: COMMERCIAL

## 2021-09-22 VITALS — BODY MASS INDEX: 26.18 KG/M2 | WEIGHT: 147.8 LBS

## 2021-09-22 PROCEDURE — 93798 PHYS/QHP OP CAR RHAB W/ECG: CPT

## 2021-09-23 ENCOUNTER — APPOINTMENT (OUTPATIENT)
Dept: CARDIAC REHAB | Age: 66
End: 2021-09-23
Payer: COMMERCIAL

## 2021-10-05 ENCOUNTER — HOSPITAL ENCOUNTER (OUTPATIENT)
Dept: CARDIAC REHAB | Age: 66
Discharge: HOME | End: 2021-10-05
Payer: COMMERCIAL

## 2021-10-05 VITALS — WEIGHT: 147.7 LBS | BODY MASS INDEX: 26.16 KG/M2

## 2021-10-05 PROCEDURE — 93798 PHYS/QHP OP CAR RHAB W/ECG: CPT

## 2021-10-06 ENCOUNTER — HOSPITAL ENCOUNTER (OUTPATIENT)
Dept: CARDIAC REHAB | Age: 66
Discharge: HOME | End: 2021-10-06
Payer: COMMERCIAL

## 2021-10-06 VITALS — BODY MASS INDEX: 26.29 KG/M2 | WEIGHT: 148.4 LBS

## 2021-10-06 PROCEDURE — 93798 PHYS/QHP OP CAR RHAB W/ECG: CPT

## 2021-10-08 ENCOUNTER — HOSPITAL ENCOUNTER (OUTPATIENT)
Dept: CARDIAC REHAB | Age: 66
Discharge: HOME | End: 2021-10-08
Payer: COMMERCIAL

## 2021-10-08 VITALS — WEIGHT: 148.2 LBS | BODY MASS INDEX: 26.25 KG/M2

## 2021-10-08 PROCEDURE — 93798 PHYS/QHP OP CAR RHAB W/ECG: CPT

## 2021-10-12 ENCOUNTER — OFFICE VISIT (OUTPATIENT)
Dept: CARDIOLOGY CLINIC | Age: 66
End: 2021-10-12
Payer: COMMERCIAL

## 2021-10-12 VITALS
BODY MASS INDEX: 26.05 KG/M2 | DIASTOLIC BLOOD PRESSURE: 78 MMHG | SYSTOLIC BLOOD PRESSURE: 136 MMHG | WEIGHT: 147 LBS | HEIGHT: 63 IN | HEART RATE: 78 BPM | RESPIRATION RATE: 18 BRPM

## 2021-10-12 DIAGNOSIS — Z98.890 STATUS POST ENDOVENOUS RADIOFREQUENCY ABLATION (RFA) OF SAPHENOUS VEIN: ICD-10-CM

## 2021-10-12 DIAGNOSIS — I87.2 VENOUS INSUFFICIENCY OF BOTH LOWER EXTREMITIES: Primary | ICD-10-CM

## 2021-10-12 DIAGNOSIS — I10 BENIGN ESSENTIAL HTN: ICD-10-CM

## 2021-10-12 DIAGNOSIS — E78.2 MIXED HYPERLIPIDEMIA: ICD-10-CM

## 2021-10-12 DIAGNOSIS — I25.10 CORONARY ARTERY DISEASE DUE TO LIPID RICH PLAQUE: ICD-10-CM

## 2021-10-12 DIAGNOSIS — I83.893 VARICOSE VEINS OF BOTH LEGS WITH EDEMA: ICD-10-CM

## 2021-10-12 DIAGNOSIS — I25.83 CORONARY ARTERY DISEASE DUE TO LIPID RICH PLAQUE: ICD-10-CM

## 2021-10-12 PROCEDURE — 99214 OFFICE O/P EST MOD 30 MIN: CPT | Performed by: INTERNAL MEDICINE

## 2021-10-12 NOTE — PROGRESS NOTES
10/12/2021 9:22 AM      Subjective:     Melinda Vega   Is here for follow up s/p right GSV ablation. She has pmhx ASHD HTN HLD and venous insufficiency. Today she reports resolution of right leg symptoms and is looking forward on RF ablation on left leg: still has swelling, pain and cramping. She continues to wear compression stockings of left leg. She denies chest pain, chest pressure/discomfort, dyspnea, palpitations, irregular heart beats, near-syncope, syncope, fatigue, orthopnea, paroxysmal nocturnal dyspnea, exertional chest pressure/discomfort, tachypnea, dyspnea on exertion, dizziness. Visit Vitals  /78   Pulse 78   Resp 18   Ht 5' 3\" (1.6 m)   Wt 147 lb (66.7 kg)   BMI 26.04 kg/m²       Current Outpatient Medications   Medication Sig    ticagrelor (Brilinta) 60 mg tab tablet Take 60 mg by mouth two (2) times a day.  rosuvastatin (CRESTOR) 40 mg tablet TAKE 1 TABLET BY MOUTH EVERY DAY AT NIGHT    amLODIPine (NORVASC) 10 mg tablet Take 1 Tablet by mouth daily.  isosorbide mononitrate ER (IMDUR) 30 mg tablet TAKE 1 TABLET BY MOUTH EVERY DAY TO PREVENT CHEST PAIN, CALL MD IF SIGNIFICANT HEADACHE    metoprolol succinate (TOPROL-XL) 50 mg XL tablet TAKE 1 TABLET BY MOUTH EVERY DAY    aspirin 81 mg chewable tablet Take 1 Tab by mouth daily.  meloxicam (MOBIC) 15 mg tablet TAKE 1 TABLET BY MOUTH EVERY DAY AS NEEDED FOR ARTHRITIS ORALLY 30 DAY(S) (Patient not taking: Reported on 10/12/2021)    nitroglycerin (NITROSTAT) 0.4 mg SL tablet 1 Tab by SubLINGual route every five (5) minutes as needed for Chest Pain. Up to 3 doses. (Patient not taking: Reported on 10/12/2021)     No current facility-administered medications for this visit.          Objective:      Visit Vitals  /78   Pulse 78   Resp 18   Ht 5' 3\" (1.6 m)   Wt 147 lb (66.7 kg)   BMI 26.04 kg/m²       Data Review:       Past Medical History:   Diagnosis Date    CAD (coronary artery disease)     Hypertension       Past Surgical History:   Procedure Laterality Date    HX CORONARY STENT PLACEMENT  04/26/2017    HX GYN      vaginal wall repair    HX OTHER SURGICAL      surgery for fissure    CT BREAST SURGERY PROCEDURE UNLISTED      cystectomy x2     Allergies   Allergen Reactions    Codeine Nausea and Vomiting      Family History   Problem Relation Age of Onset    Heart Disease Mother     Heart Disease Father       Social History     Socioeconomic History    Marital status:      Spouse name: Not on file    Number of children: Not on file    Years of education: Not on file    Highest education level: Not on file   Occupational History    Not on file   Tobacco Use    Smoking status: Never Smoker    Smokeless tobacco: Never Used   Vaping Use    Vaping Use: Never used   Substance and Sexual Activity    Alcohol use: Yes     Comment: 5-6 beers a week    Drug use: No    Sexual activity: Never   Other Topics Concern     Service No    Blood Transfusions No    Caffeine Concern No    Occupational Exposure No    Hobby Hazards No    Sleep Concern Yes    Stress Concern No    Weight Concern Yes    Special Diet Yes    Back Care No    Exercise Yes     Comment: walk dogs 3 miles/day    Bike Helmet Yes    Seat Belt Yes    Self-Exams No   Social History Narrative    Not on file     Social Determinants of Health     Financial Resource Strain:     Difficulty of Paying Living Expenses:    Food Insecurity:     Worried About Running Out of Food in the Last Year:     Ran Out of Food in the Last Year:    Transportation Needs:     Lack of Transportation (Medical):      Lack of Transportation (Non-Medical):    Physical Activity:     Days of Exercise per Week:     Minutes of Exercise per Session:    Stress:     Feeling of Stress :    Social Connections:     Frequency of Communication with Friends and Family:     Frequency of Social Gatherings with Friends and Family:     Attends [employer] Services:     Active Member of Clubs or Organizations:     Attends Club or Organization Meetings:     Marital Status:    Intimate Partner Violence:     Fear of Current or Ex-Partner:     Emotionally Abused:     Physically Abused:     Sexually Abused:        PHYSICIAN TO COMPLETE    Date of Physician Reevaluation:________10/12/2021_______________________  (To review results of trial of conservative therapy-lasting at least 3-6 months):  Patient is symptomatic with varicosities despite compliance with conservative therapy. Has failed conservative treatment. Check all that apply:  [x] Other causes of patients leg(s) symptoms have been ruled out  [x] Completed conservative treatment to include: compression stockings, medication, leg elevation, mild exercise & weight         reduction (as appropriate). Time length of conservative treatment[de-identified] _____3 mos______________    Patient is symptomatic with varicosities causing the following: (check all that apply):  [x] Has persistent aching, cramping, burning, pain, itching, and/or swelling during activity or after prolonged standing on left side only now. [] Significant, recurrent superficial phlebitis  [] Hemorrhage from a ruptured varix  [] Non-healing skin ulceration of the leg  [] Other complications associated:  ___________________      Duplex or Doppler Ultrasound of the venous system demonstrate:  [x] Absence of deep venous thrombosis  [x] Greater and/or lesser saphenous vein or  valvular incompetence/reflux that correlates with        patients symptoms  [x]   valvular incompetence/reflux that correlates with patients symptoms         Assessment:       ICD-10-CM ICD-9-CM    1. Venous insufficiency of both lower extremities  I87.2 459.81 LIPID PANEL      METABOLIC PANEL, COMPREHENSIVE   2.  Varicose veins of both legs with edema  I83.893 454.8 LIPID PANEL      METABOLIC PANEL, COMPREHENSIVE   3. Status post endovenous radiofrequency ablation (RFA) of saphenous vein  Z98.890 V45.89 LIPID PANEL      METABOLIC PANEL, COMPREHENSIVE   4. Coronary artery disease due to lipid rich plaque  I25.10 414.00 LIPID PANEL    I85.29 632.3 METABOLIC PANEL, COMPREHENSIVE   5. Benign essential HTN  I10 401.1 LIPID PANEL      METABOLIC PANEL, COMPREHENSIVE   6. Mixed hyperlipidemia  E78.2 272.2 LIPID PANEL      METABOLIC PANEL, COMPREHENSIVE       Plan:     1. Venous insufficiency, bilateral  S/p Right GSV RF ablation on 9/15/2021. Feeling better right leg, resolution of leg swelling and cramping. Looking forward to left GSV ablation as she remains symptomatic on left leg    2. Bp controlled. 3. HLD: 3/2021 LDL 90 we switched from atorvastatin to rosuvastatin    4. Other cardiac care per dr mcmahon:  Note that pt is taking brilinta 60 mg BID,  States this was started after hospitalization in March. Will follow up with dr Aliza Blanco NP  10/12/2021      Patient seen and examined by me with nurse practitioner. I personally performed all components of the history, physical, and medical decision making and agree with the assessment and plan as noted. Excellent response in right LE after GSV RF ablation. Feels much better. Wants to proceed with left sided procedure. Will get pre auth. Continue other meds. BP controlled. On statin. F/u with Dr. Eliezer Flanagan for cardiac care.      Maria Guadalupe Gastelum MD

## 2021-10-13 ENCOUNTER — HOSPITAL ENCOUNTER (OUTPATIENT)
Dept: CARDIAC REHAB | Age: 66
Discharge: HOME | End: 2021-10-13
Payer: COMMERCIAL

## 2021-10-13 VITALS — WEIGHT: 147.4 LBS | BODY MASS INDEX: 26.11 KG/M2

## 2021-10-13 PROCEDURE — 93798 PHYS/QHP OP CAR RHAB W/ECG: CPT

## 2021-10-15 ENCOUNTER — HOSPITAL ENCOUNTER (OUTPATIENT)
Dept: CARDIAC REHAB | Age: 66
Discharge: HOME | End: 2021-10-15
Payer: COMMERCIAL

## 2021-10-15 VITALS — BODY MASS INDEX: 26.22 KG/M2 | WEIGHT: 148 LBS

## 2021-10-15 PROCEDURE — 93798 PHYS/QHP OP CAR RHAB W/ECG: CPT

## 2021-10-19 ENCOUNTER — HOSPITAL ENCOUNTER (OUTPATIENT)
Dept: CARDIAC REHAB | Age: 66
Discharge: HOME | End: 2021-10-19
Payer: COMMERCIAL

## 2021-10-19 VITALS — BODY MASS INDEX: 26.15 KG/M2 | WEIGHT: 147.6 LBS

## 2021-10-19 PROCEDURE — 93798 PHYS/QHP OP CAR RHAB W/ECG: CPT

## 2021-10-20 ENCOUNTER — HOSPITAL ENCOUNTER (OUTPATIENT)
Dept: CARDIAC REHAB | Age: 66
Discharge: HOME | End: 2021-10-20
Payer: COMMERCIAL

## 2021-10-20 VITALS — WEIGHT: 147.8 LBS | BODY MASS INDEX: 26.18 KG/M2

## 2021-10-20 PROCEDURE — 93798 PHYS/QHP OP CAR RHAB W/ECG: CPT

## 2021-10-22 ENCOUNTER — HOSPITAL ENCOUNTER (OUTPATIENT)
Dept: CARDIAC REHAB | Age: 66
Discharge: HOME | End: 2021-10-22
Payer: COMMERCIAL

## 2021-10-22 VITALS — WEIGHT: 147.7 LBS | BODY MASS INDEX: 26.16 KG/M2

## 2021-10-22 PROCEDURE — 93798 PHYS/QHP OP CAR RHAB W/ECG: CPT

## 2021-10-25 ENCOUNTER — CLINICAL SUPPORT (OUTPATIENT)
Dept: CARDIOLOGY CLINIC | Age: 66
End: 2021-10-25
Payer: COMMERCIAL

## 2021-10-25 VITALS — DIASTOLIC BLOOD PRESSURE: 66 MMHG | SYSTOLIC BLOOD PRESSURE: 126 MMHG | OXYGEN SATURATION: 98 % | HEART RATE: 80 BPM

## 2021-10-25 DIAGNOSIS — I83.893 VARICOSE VEINS OF BOTH LEGS WITH EDEMA: Primary | ICD-10-CM

## 2021-10-25 DIAGNOSIS — I87.2 VENOUS INSUFFICIENCY OF BOTH LOWER EXTREMITIES: ICD-10-CM

## 2021-10-25 PROCEDURE — 36475 ENDOVENOUS RF 1ST VEIN: CPT | Performed by: INTERNAL MEDICINE

## 2021-10-25 PROCEDURE — 96372 THER/PROPH/DIAG INJ SC/IM: CPT | Performed by: INTERNAL MEDICINE

## 2021-10-25 NOTE — PATIENT INSTRUCTIONS
RADIOFREQUENCY ABLATION      Post procedure instructions:      Make sure you walk for 20 minutes before you leave the facility and at least 10 minutes per hour for the remainder of the day of your procedure. 1. You will be asked to return to the office 3 days after your procedure for a follow up ultrasound unless told otherwise. 2. You are to wear your compression stocking for 72 hours following your procedure. After the 72 hours you are to wear your compression stockings only during the day for at least 2 weeks. You do not sleep with your stockings on except for the initial 72 hours. 3. If you are sent home with a bandage wrap, do not get it wet. If you are to take a shower after the procedure, tie a plastic bag over the bandage in order to keep it dry. 4. Please do not lift anything over 20 pounds for 7 days following your procedure. If you are going to rest after your procedure please place pillows under your leg and elevate that leg so your feet are at the level of your heart. 5. Notify our staff if your employer requires an excused doctors note for any reason pertaining to the day of your procedure and follow ups. 6. After your procedure you may return to your normal routine/activities unless told otherwise. 7. Please notify Dr. Alice Lyon Chaudhry if you experience any discoloration of your toes or discomfort of the bandage wrap, inability to move your toes, bleeding, or pain you feel is not tolerable. Our office number is [phone].

## 2021-10-25 NOTE — PROGRESS NOTES
[facility] [facility]    Patient: Jack Galdamez  : 1955  Chart no: [id number]  DATE OF SERVICE: 10/25/2021    Procedure: Endovenous radiofrequency ablation of the left greater saphenous vein (s) of the lower extremity  Anesthesia: Local infiltration  Estimated blood loss: minimal  Specimen: none. Tumescent vol: 400 ml    OPERATIVE NOTE     The patient was transferred to the procedure suite and the insufficient saphenous vein was mapped by ultrasound and diagrammed on the overlying skin. The depth and diameter of the vein(s) to be treated was documented. The varicose tributary veins and suitable access sites were identified and mapped as well. The patient was then positioned supine on the procedure table. The affected limb was prepped and draped in the usual sterile fashion. The RF catheter was placed on the sterile field, flushed and wiped down, prepared, and connected by a sterile cable. The patient was placed in reverse-Trendelenburg position and local anesthesia was instilled in the skin overlying the access site. The vein was accessed using ultrasound guidance and the Seldinger technique, a guide wire was introduced through the needle, which was then exchanged over the guide wire for a 7F sheath, which was secured in place. The guide wire was removed and the sheath was flushed. The RF catheter was placed into the vein through the sheath and Preferentially, imaging was used to place the catheter tip just inferior to the superficial epigastric vein to preserve normal physiological flow in that vein. Additionally, it was confirmed by ultrasound guidance that the catheter tip was also placed a minimum of 2cm distal to the saphenofemoral junction.     After the RF catheter position was verified by ultrasound, tumescent anesthesia was infiltrated, under ultrasound guidance, precisely into the perivenous compartment along the entire length of vein from the entry site to the saphenofemoral junction until a \"halo\" of fluid was noted around the vein. The patient was then placed in Trendelenburg position to further exsanguinate the superficial venous system. After RF catheter position was again confirmed with ultrasound imaging, and under direct external compression along the length of the heating element, RF energy was applied. The vein was segmentally ablated by heating a 7 cm segment and then indexing the catheter forward by 6.5 cm until the treatment length is completed. Device temperature was maintained at 120±5 ºC with an initial power level of 40W dropping to below 20W for each treatment. Total vein length treated 35 cm Total cycles of RF 10 for 3 minutes 20 seconds. Repeat ultrasound of the saphenous vein was performed, confirming successful treatment. The catheter and sheath were withdrawn and hemostasis established with direct pressure. After assuring hemostasis, the skin incision over the saphenous vein was closed with a bandage and a compression wrap, and/or graduated compression stocking was applied from the level of the foot to the most proximal level of the thigh. Patient tolerated procedure very well.        Ceci Oneil MD, [place], [address] [address] [facility]   [address], [address] [facility]  [phone]

## 2021-10-27 ENCOUNTER — ANCILLARY PROCEDURE (OUTPATIENT)
Dept: CARDIOLOGY CLINIC | Age: 66
End: 2021-10-27
Payer: COMMERCIAL

## 2021-10-27 ENCOUNTER — APPOINTMENT (OUTPATIENT)
Dept: CARDIAC REHAB | Age: 66
End: 2021-10-27
Payer: COMMERCIAL

## 2021-10-27 DIAGNOSIS — Z98.890 STATUS POST ENDOVENOUS RADIOFREQUENCY ABLATION (RFA) OF SAPHENOUS VEIN: ICD-10-CM

## 2021-10-27 PROCEDURE — 93971 EXTREMITY STUDY: CPT | Performed by: INTERNAL MEDICINE

## 2021-10-29 ENCOUNTER — APPOINTMENT (OUTPATIENT)
Dept: CARDIAC REHAB | Age: 66
End: 2021-10-29
Payer: COMMERCIAL

## 2021-11-02 ENCOUNTER — HOSPITAL ENCOUNTER (OUTPATIENT)
Dept: CARDIAC REHAB | Age: 66
Discharge: HOME | End: 2021-11-02
Payer: COMMERCIAL

## 2021-11-02 VITALS — WEIGHT: 148.4 LBS | BODY MASS INDEX: 26.29 KG/M2

## 2021-11-02 PROCEDURE — 93798 PHYS/QHP OP CAR RHAB W/ECG: CPT

## 2021-11-03 ENCOUNTER — HOSPITAL ENCOUNTER (OUTPATIENT)
Dept: CARDIAC REHAB | Age: 66
Discharge: HOME | End: 2021-11-03
Payer: COMMERCIAL

## 2021-11-03 VITALS — BODY MASS INDEX: 26.18 KG/M2 | WEIGHT: 147.8 LBS

## 2021-11-03 PROCEDURE — 93798 PHYS/QHP OP CAR RHAB W/ECG: CPT

## 2021-11-03 NOTE — CARDIO/PULMONARY
Melinda Vega  Completed phase II cardiac rehab and attended 36. Melinda Vega is interested in maintaining optimal health and will work with Dr. Kamala Andrade. Melinda Vega has improved her endurance and stamina through regular exercise. Blood pressure is 138/68 and is not WNL. She has also improved his/her nutrition, [employer] and depression scores and these were reviewed with patient. Current Outpatient Medications   Medication Sig    ticagrelor (Brilinta) 60 mg tab tablet Take 60 mg by mouth two (2) times a day.  rosuvastatin (CRESTOR) 40 mg tablet TAKE 1 TABLET BY MOUTH EVERY DAY AT NIGHT    amLODIPine (NORVASC) 10 mg tablet Take 1 Tablet by mouth daily.  meloxicam (MOBIC) 15 mg tablet TAKE 1 TABLET BY MOUTH EVERY DAY AS NEEDED FOR ARTHRITIS ORALLY 30 DAY(S)    nitroglycerin (NITROSTAT) 0.4 mg SL tablet 1 Tab by SubLINGual route every five (5) minutes as needed for Chest Pain. Up to 3 doses.  isosorbide mononitrate ER (IMDUR) 30 mg tablet TAKE 1 TABLET BY MOUTH EVERY DAY TO PREVENT CHEST PAIN, CALL MD IF SIGNIFICANT HEADACHE    metoprolol succinate (TOPROL-XL) 50 mg XL tablet TAKE 1 TABLET BY MOUTH EVERY DAY    aspirin 81 mg chewable tablet Take 1 Tab by mouth daily. No current facility-administered medications for this encounter. Melinda Vega plans to continue exercising at home.     Zaki Viera RN  11/3/2021

## 2021-11-09 ENCOUNTER — OFFICE VISIT (OUTPATIENT)
Dept: CARDIOLOGY CLINIC | Age: 66
End: 2021-11-09
Payer: COMMERCIAL

## 2021-11-09 VITALS — OXYGEN SATURATION: 98 % | HEIGHT: 63 IN | WEIGHT: 149.3 LBS | HEART RATE: 80 BPM | BODY MASS INDEX: 26.45 KG/M2

## 2021-11-09 DIAGNOSIS — I87.2 VENOUS INSUFFICIENCY OF BOTH LOWER EXTREMITIES: Primary | ICD-10-CM

## 2021-11-09 DIAGNOSIS — I83.893 VARICOSE VEINS OF BOTH LEGS WITH EDEMA: ICD-10-CM

## 2021-11-09 DIAGNOSIS — I10 PRIMARY HYPERTENSION: ICD-10-CM

## 2021-11-09 DIAGNOSIS — I25.10 CORONARY ARTERY DISEASE INVOLVING NATIVE CORONARY ARTERY OF NATIVE HEART WITHOUT ANGINA PECTORIS: ICD-10-CM

## 2021-11-09 PROCEDURE — 99214 OFFICE O/P EST MOD 30 MIN: CPT | Performed by: INTERNAL MEDICINE

## 2021-11-09 NOTE — PROGRESS NOTES
Chief Complaint   Patient presents with    Varicose Veins     Post RF Ablation   Knot on left leg has pain that comes and goes. Feels knot on left thigh at incision site. 1. Have you been to the ER, urgent care clinic since your last visit? Hospitalized since your last visitno    2. Have you seen or consulted any other health care providers outside of the [address] [place] [address] since your last visit?   Include any pap smears or colon screening no

## 2021-11-09 NOTE — PROGRESS NOTES
11/9/2021 3:28 PM      Subjective:     Jose Cantu   denies chest pain, chest pressure/discomfort, dyspnea, palpitations, irregular heart beats, near-syncope, syncope, fatigue, orthopnea, paroxysmal nocturnal dyspnea, exertional chest pressure/discomfort. Visit Vitals  Pulse 80   Ht 5' 3\" (1.6 m)   Wt 149 lb 4.8 oz (67.7 kg)   LMP  (LMP Unknown)   SpO2 98%   BMI 26.45 kg/m²     Current Outpatient Medications   Medication Sig    ticagrelor (Brilinta) 60 mg tab tablet Take 60 mg by mouth two (2) times a day.  rosuvastatin (CRESTOR) 40 mg tablet TAKE 1 TABLET BY MOUTH EVERY DAY AT NIGHT    amLODIPine (NORVASC) 10 mg tablet Take 1 Tablet by mouth daily.  meloxicam (MOBIC) 15 mg tablet TAKE 1 TABLET BY MOUTH EVERY DAY AS NEEDED FOR ARTHRITIS ORALLY 30 DAY(S)    nitroglycerin (NITROSTAT) 0.4 mg SL tablet 1 Tab by SubLINGual route every five (5) minutes as needed for Chest Pain. Up to 3 doses.  isosorbide mononitrate ER (IMDUR) 30 mg tablet TAKE 1 TABLET BY MOUTH EVERY DAY TO PREVENT CHEST PAIN, CALL MD IF SIGNIFICANT HEADACHE    metoprolol succinate (TOPROL-XL) 50 mg XL tablet TAKE 1 TABLET BY MOUTH EVERY DAY    aspirin 81 mg chewable tablet Take 1 Tab by mouth daily. No current facility-administered medications for this visit.          Objective:      Visit Vitals  Pulse 80   Ht 5' 3\" (1.6 m)   Wt 149 lb 4.8 oz (67.7 kg)   SpO2 98%   BMI 26.45 kg/m²       Past Medical History:   Diagnosis Date    CAD (coronary artery disease)     Hypertension       Past Surgical History:   Procedure Laterality Date    HX CORONARY STENT PLACEMENT  04/26/2017    HX GYN      vaginal wall repair    HX OTHER SURGICAL      surgery for fissure    CA BREAST SURGERY PROCEDURE UNLISTED      cystectomy x2     Allergies   Allergen Reactions    Codeine Nausea and Vomiting      Family History   Problem Relation Age of Onset    Heart Disease Mother     Heart Disease Father       Social History Socioeconomic History    Marital status:      Spouse name: Not on file    Number of children: Not on file    Years of education: Not on file    Highest education level: Not on file   Occupational History    Not on file   Tobacco Use    Smoking status: Never Smoker    Smokeless tobacco: Never Used   Vaping Use    Vaping Use: Never used   Substance and Sexual Activity    Alcohol use: Yes     Comment: 5-6 beers a week    Drug use: Yes     Types: Prescription    Sexual activity: Never   Other Topics Concern     Service No    Blood Transfusions No    Caffeine Concern No    Occupational Exposure No    Hobby Hazards No    Sleep Concern Yes    Stress Concern No    Weight Concern Yes    Special Diet Yes    Back Care No    Exercise Yes     Comment: walk dogs 3 miles/day    Bike Helmet Yes    Seat Belt Yes    Self-Exams No   Social History Narrative    Not on file     Social Determinants of Health     Financial Resource Strain:     Difficulty of Paying Living Expenses: Not on file   Food Insecurity:     Worried About Running Out of Food in the Last Year: Not on file    Jono of Food in the Last Year: Not on file   Transportation Needs:     Lack of Transportation (Medical): Not on file    Lack of Transportation (Non-Medical):  Not on file   Physical Activity:     Days of Exercise per Week: Not on file    Minutes of Exercise per Session: Not on file   Stress:     Feeling of Stress : Not on file   Social Connections:     Frequency of Communication with Friends and Family: Not on file    Frequency of Social Gatherings with Friends and Family: Not on file    Attends Worship Services: Not on file    Active Member of Clubs or Organizations: Not on file    Attends Club or Organization Meetings: Not on file    Marital Status: Not on file   Intimate Partner Violence:     Fear of Current or Ex-Partner: Not on file    Emotionally Abused: Not on file    Physically Abused: Not on file    Sexually Abused: Not on file   Housing Stability:     Unable to Pay for Housing in the Last Year: Not on file    Number of Places Lived in the Last Year: Not on file    Unstable Housing in the Last Year: Not on file       Assessment:       ICD-10-CM ICD-9-CM    1. Venous insufficiency of both lower extremities  I87.2 459.81    2. Varicose veins of both legs with edema  I83.893 454.8    3. Primary hypertension  I10 401.9    4. Coronary artery disease involving native coronary artery of native heart without angina pectoris  I25.10 414.01        Plan:     1. Venous insufficiency, bilateral  S/p Right GSV RF ablation on 9/15/2021 and left GSV ablation 10/2021  Photo taken . F/u with me as needed.      2. Bp controlled.   3. HLD: on statin.    4. Other cardiac care per dr Robert Smith:

## 2021-12-14 RX ORDER — ISOSORBIDE MONONITRATE 30 MG/1
TABLET, EXTENDED RELEASE ORAL
Qty: 90 TABLET | Refills: 3 | Status: SHIPPED | OUTPATIENT
Start: 2021-12-14

## 2022-03-18 PROBLEM — I87.2 VENOUS INSUFFICIENCY OF BOTH LOWER EXTREMITIES: Status: ACTIVE | Noted: 2020-07-21

## 2022-03-18 PROBLEM — I24.9 ACUTE CORONARY SYNDROME (HCC): Status: ACTIVE | Noted: 2021-03-22

## 2022-03-19 PROBLEM — R07.9 CHEST PAIN: Status: ACTIVE | Noted: 2021-03-22

## 2022-03-19 PROBLEM — I20.0 UNSTABLE ANGINA (HCC): Status: ACTIVE | Noted: 2017-04-26

## 2022-03-19 PROBLEM — I25.10 CAD (CORONARY ARTERY DISEASE): Status: ACTIVE | Noted: 2020-07-21

## 2022-03-19 PROBLEM — E78.5 HLD (HYPERLIPIDEMIA): Status: ACTIVE | Noted: 2021-03-17

## 2022-03-19 PROBLEM — I10 HTN (HYPERTENSION): Status: ACTIVE | Noted: 2021-03-17

## 2022-03-20 PROBLEM — I83.893 VARICOSE VEINS OF BOTH LEGS WITH EDEMA: Status: ACTIVE | Noted: 2020-07-21

## 2022-03-20 PROBLEM — Z98.890 S/P CARDIAC CATH: Status: ACTIVE | Noted: 2021-03-18

## 2022-05-09 RX ORDER — METOPROLOL SUCCINATE 50 MG/1
50 TABLET, EXTENDED RELEASE ORAL DAILY
Qty: 90 TABLET | Refills: 3 | Status: SHIPPED | OUTPATIENT
Start: 2022-05-09

## 2022-05-09 NOTE — TELEPHONE ENCOUNTER
Patient needs refill on medication. Will be out this week, is going out of town.      Confirmed pharmacy     Patient [phone]

## 2022-05-09 NOTE — TELEPHONE ENCOUNTER
Refill Request Received for the Following Medication     Requested Prescriptions     Pending Prescriptions Disp Refills    ticagrelor (Brilinta) 60 mg tab tablet 60 Tablet 3     Sig: Take 1 Tablet by mouth two (2) times a day. Signed Prescriptions Disp Refills    metoprolol succinate (TOPROL-XL) 50 mg XL tablet 90 Tablet 3     Sig: Take 1 Tablet by mouth daily.      Authorizing Provider: Nkechi Aranda     Ordering User: Clare Lund       Last Prescribed: April 21, 2021    Last Appointment [id number]    Future Appointments:  Future Appointments   Date Time Provider George Koenig   6/14/2022  3:20 PM Juana Huggins MD [facility]

## 2022-05-11 NOTE — TELEPHONE ENCOUNTER
----- Message from Sam Young NP sent at 5/9/2022  2:00 PM EDT -----  Yes at lower dose of brilinta--60 mg BID

## 2022-05-12 ENCOUNTER — TELEPHONE (OUTPATIENT)
Dept: CARDIOLOGY CLINIC | Age: 67
End: 2022-05-12

## 2022-05-12 NOTE — TELEPHONE ENCOUNTER
Pre-Procedure Cardiac Clearance Request:    Facility: [facility]     Procedure Date: May 20, 2022    Procedure: Dacryocyctorhinostomy right Eye    Surgeon: Ruta Kehr    Anesthesia :unknown    Request for Interruption of Anticoagulants:   Brilinta 60 mg  Aspirin 81 mg    May stop anticoagulants  how many days prior and when to resume    Is patient cleared from a cardiac standpoint to proceed with upcoming procedure. Please advise.

## 2022-05-12 NOTE — TELEPHONE ENCOUNTER
Per Aubrie Jensen., NP  Has not been seen in almost a year but if doing well and without cardiac complaints,   May proceed with eye procedure.  May hold ASA/Brilinta 60 mg BID 5-7 days prior to procedure and   Resume soon as seemed safe by eye surgeon.

## 2022-06-14 ENCOUNTER — OFFICE VISIT (OUTPATIENT)
Dept: CARDIOLOGY CLINIC | Age: 67
End: 2022-06-14
Payer: COMMERCIAL

## 2022-06-14 VITALS
DIASTOLIC BLOOD PRESSURE: 60 MMHG | HEIGHT: 63 IN | RESPIRATION RATE: 16 BRPM | WEIGHT: 142 LBS | HEART RATE: 88 BPM | SYSTOLIC BLOOD PRESSURE: 130 MMHG | OXYGEN SATURATION: 97 % | BODY MASS INDEX: 25.16 KG/M2

## 2022-06-14 DIAGNOSIS — R00.2 HEART PALPITATIONS: ICD-10-CM

## 2022-06-14 DIAGNOSIS — I25.10 CORONARY ARTERY DISEASE INVOLVING NATIVE CORONARY ARTERY OF NATIVE HEART WITHOUT ANGINA PECTORIS: Primary | ICD-10-CM

## 2022-06-14 DIAGNOSIS — I10 BENIGN ESSENTIAL HTN: ICD-10-CM

## 2022-06-14 DIAGNOSIS — I83.893 VARICOSE VEINS OF BOTH LEGS WITH EDEMA: ICD-10-CM

## 2022-06-14 DIAGNOSIS — Z98.890 STATUS POST ENDOVENOUS RADIOFREQUENCY ABLATION (RFA) OF SAPHENOUS VEIN: ICD-10-CM

## 2022-06-14 DIAGNOSIS — E78.2 MIXED HYPERLIPIDEMIA: ICD-10-CM

## 2022-06-14 DIAGNOSIS — I10 PRIMARY HYPERTENSION: ICD-10-CM

## 2022-06-14 PROCEDURE — 99214 OFFICE O/P EST MOD 30 MIN: CPT | Performed by: INTERNAL MEDICINE

## 2022-06-14 NOTE — LETTER
7/10/2022    Patient: Melinda Vega   YOB: 1955   Date of Visit: 6/14/2022     Keri Doll MD  [address] [facility]  [address] [address]  Via Fax: [phone]    Dear Keri Doll MD,      Thank you for referring Ms. Melinda Vega to [facility] for evaluation. My notes for this consultation are attached. If you have questions, please do not hesitate to call me. I look forward to following your patient along with you.       Sincerely,    María Meier MD

## 2022-06-14 NOTE — PROGRESS NOTES
[address] [place] [address], [address] [facility]  [phone]     Subjective:      Fredrick Santos is a 61 y.o. female is here for routine follow-up, last seen by us in April 2021:    Recall office follow-up in April 2021:  Symptoms consistent with acute coronary syndrome. Recent catheterization shows no obstructive lesions. D-dimer negative. CTA negative for dissection. Symptoms began shortly after discontinuing Brilinta. Will restart Brilinta at 90 mg twice daily, to finish the remainder of the 3-month supply that she just recently got. After that, will decrease to 60 mg twice daily. SL NTG prescribed. Today, the patient denies chest pain/ shortness of breath, orthopnea, PND, LE edema, palpitations, syncope, or presyncope.          Patient Active Problem List    Diagnosis Date Noted    Chest pain 03/22/2021    Acute coronary syndrome ([facility] [address] 03/22/2021    S/P cardiac cath 03/18/2021    HTN (hypertension) 03/17/2021    HLD (hyperlipidemia) 03/17/2021    Venous insufficiency of both lower extremities 07/21/2020    Varicose veins of both legs with edema 07/21/2020    CAD (coronary artery disease) 07/21/2020    Unstable angina ([facility] [address] 04/26/2017      Dasha Santos MD  Past Medical History:   Diagnosis Date    CAD (coronary artery disease)     Hypertension       Past Surgical History:   Procedure Laterality Date    HX CORONARY STENT PLACEMENT  04/26/2017    HX GYN      vaginal wall repair    HX OTHER SURGICAL      surgery for fissure    TN BREAST SURGERY PROCEDURE UNLISTED      cystectomy x2     Allergies   Allergen Reactions    Codeine Nausea and Vomiting      Family History   Problem Relation Age of Onset    Heart Disease Mother     Heart Disease Father       Social History     Socioeconomic History    Marital status:      Spouse name: Not on file    Number of children: Not on file    Years of education: Not on file    Highest education level: Not on file Occupational History    Not on file   Tobacco Use    Smoking status: Never Smoker    Smokeless tobacco: Never Used   Vaping Use    Vaping Use: Never used   Substance and Sexual Activity    Alcohol use: Yes     Comment: 5-6 beers a week    Drug use: Yes     Types: Prescription    Sexual activity: Never   Other Topics Concern     Service No    Blood Transfusions No    Caffeine Concern No    Occupational Exposure No    Hobby Hazards No    Sleep Concern Yes    Stress Concern No    Weight Concern Yes    Special Diet Yes    Back Care No    Exercise Yes     Comment: walk dogs 3 miles/day    Bike Helmet Yes    Seat Belt Yes    Self-Exams No   Social History Narrative    Not on file     Social Determinants of Health     Financial Resource Strain:     Difficulty of Paying Living Expenses: Not on file   Food Insecurity:     Worried About Running Out of Food in the Last Year: Not on file    Jono of Food in the Last Year: Not on file   Transportation Needs:     Lack of Transportation (Medical): Not on file    Lack of Transportation (Non-Medical):  Not on file   Physical Activity:     Days of Exercise per Week: Not on file    Minutes of Exercise per Session: Not on file   Stress:     Feeling of Stress : Not on file   Social Connections:     Frequency of Communication with Friends and Family: Not on file    Frequency of Social Gatherings with Friends and Family: Not on file    Attends Temple Services: Not on file    Active Member of Clubs or Organizations: Not on file    Attends Club or Organization Meetings: Not on file    Marital Status: Not on file   Intimate Partner Violence:     Fear of Current or Ex-Partner: Not on file    Emotionally Abused: Not on file    Physically Abused: Not on file    Sexually Abused: Not on file   Housing Stability:     Unable to Pay for Housing in the Last Year: Not on file    Number of Places Lived in the Last Year: Not on file    Unstable Housing in the Last Year: Not on file      Current Outpatient Medications   Medication Sig    rosuvastatin (CRESTOR) 40 mg tablet TAKE 1 TABLET BY MOUTH EVERY DAY AT NIGHT    ticagrelor (Brilinta) 60 mg tab tablet Take 1 Tablet by mouth two (2) times a day.  metoprolol succinate (TOPROL-XL) 50 mg XL tablet Take 1 Tablet by mouth daily.  amLODIPine (NORVASC) 10 mg tablet TAKE 1 TABLET BY MOUTH EVERY DAY    isosorbide mononitrate ER (IMDUR) 30 mg tablet TAKE 1 TABLET BY MOUTH EVERY DAY TO PREVENT CHEST PAIN, CALL MD IF SIGNIFICANT HEADACHE    aspirin 81 mg chewable tablet Take 1 Tab by mouth daily.  nitroglycerin (NITROSTAT) 0.4 mg SL tablet 1 Tab by SubLINGual route every five (5) minutes as needed for Chest Pain. Up to 3 doses. No current facility-administered medications for this visit. Review of Symptoms:  11 systems reviewed, negative other than as stated in the HPI    Physical ExamPhysical Exam:    Vitals:    06/14/22 1535   BP: 130/60   Pulse: 88   Resp: 16   SpO2: 97%   Weight: 142 lb (64.4 kg)   Height: 5' 3\" (1.6 m)     Body mass index is 25.15 kg/m². General PE  Gen:  NAD  Mental Status - Alert. General Appearance - Not in acute distress. HEENT:  PERRL, no carotid bruits or JVD  Chest and Lung Exam   Inspection: Accessory muscles - No use of accessory muscles in breathing. Auscultation:   Breath sounds: - Normal.   Cardiovascular   Inspection: Jugular vein - Bilateral - Inspection Normal.   Palpation/Percussion:   Apical Impulse: - Normal.   Auscultation: Rhythm - Regular. Heart Sounds - S1 WNL and S2 WNL. No S3 or S4. Murmurs & Other Heart Sounds: Auscultation of the heart reveals - No Murmurs. Peripheral Vascular   Upper Extremity: Inspection - Bilateral - No Cyanotic nailbeds or Digital clubbing. Lower Extremity:   Palpation: Edema - Bilateral - No edema. Abdomen:   Soft, non-tender, bowel sounds are active.   Neuro: A&O times 3, CN and motor grossly WNL    Labs:   Lab Results   Component Value Date/Time    Cholesterol, total 179 03/09/2021 09:23 AM    Cholesterol, total 208 (H) 04/27/2017 03:41 AM    HDL Cholesterol 56 03/09/2021 09:23 AM    HDL Cholesterol 51 04/27/2017 03:41 AM    LDL, calculated 90 03/09/2021 09:23 AM    LDL, calculated 125.6 (H) 04/27/2017 03:41 AM    Triglyceride 198 (H) 03/09/2021 09:23 AM    Triglyceride 157 (H) 04/27/2017 03:41 AM    CHOL/HDL Ratio 4.1 04/27/2017 03:41 AM     Lab Results   Component Value Date/Time    CK 86 04/26/2017 08:30 AM     Lab Results   Component Value Date/Time    Sodium 135 (L) 03/23/2021 03:34 AM    Potassium 3.8 03/23/2021 03:34 AM    Chloride 103 03/23/2021 03:34 AM    CO2 25 03/23/2021 03:34 AM    Anion gap 7 03/23/2021 03:34 AM    Glucose 117 (H) 03/23/2021 03:34 AM    BUN 18 03/23/2021 03:34 AM    Creatinine 0.58 03/23/2021 03:34 AM    BUN/Creatinine ratio 31 (H) 03/23/2021 03:34 AM    GFR est AA >60 03/23/2021 03:34 AM    GFR est non-AA >60 03/23/2021 03:34 AM    Calcium 8.7 03/23/2021 03:34 AM    Bilirubin, total 0.4 03/22/2021 10:22 AM    Alk. phosphatase 96 03/22/2021 10:22 AM    Protein, total 7.8 03/22/2021 10:22 AM    Albumin 4.2 03/22/2021 10:22 AM    Globulin 3.6 03/22/2021 10:22 AM    A-G Ratio 1.2 03/22/2021 10:22 AM    ALT (SGPT) 59 03/22/2021 10:22 AM       EKG:  NSR    Echo 3/23/2021  · LV: Estimated LVEF is 60 - 65%. Normal cavity size and systolic function (ejection fraction normal). Mild concentric hypertrophy. Age-appropriate left ventricular diastolic function. Cardiac catheterization 3/18/2021    Left Main   The vessel is angiographically normal.   Left Anterior Descending   There is mild disease. Previous ostial LAD stent has mild ISR   Ost LAD lesion, 30% stenosed. The lesion was previously treated using a drug-eluting stent. Previous treatment took place >2 years ago. Pressure wire/FFR was performed on the lesion. Flow Reserve: 0.92.    Left Circumflex   The vessel is angiographically normal. First Obtuse Marginal Branch   The vessel is angiographically normal.   Second Obtuse Marginal Branch   The vessel is angiographically normal.   Right Coronary Artery   The vessel is angiographically normal            Assessment:        1. Coronary artery disease involving native coronary artery of native heart without angina pectoris    2. Mixed hyperlipidemia    3. Benign essential HTN    4. Varicose veins of both legs with edema    5. Status post endovenous radiofrequency ablation (RFA) of saphenous vein    6. Primary hypertension    7. Heart palpitations        No orders of the defined types were placed in this encounter. Plan:     Lavern Owen is a 63 y.o. female is here for hospital f/u:    ASHD/ ACS 3/2021 with mild troponin elevation to 0.9:  Symptoms consistent with ACS. Cath shows no obstructive lesions. D-dimer negative. CTA negative for dissection. Symptoms began shortly after discontinuing Brilinta. Will restart Brilinta at 90 mg twice daily, to finish the remainder of the 3-month supply that she just recently got. After that, will decrease to 60 mg twice daily. SL NTG prescribed.   S/p Cardiac catheterization 3/18/2021; FFR of LAD 0.92, ostial LAD stent 30% stenosis/mild ISR.   S/p PCI/DARRELL for ISR to prox LAD 10/19 performed by DR Rankin et al  Negative NST in 10/2020; 12/19  Continue ASA brilinta 60 mg BID  Continue BB CCB Imdur statin  Completed cardiac rehab, and enjoyed it very much, now continuing to walk 3 miles frequently and do elliptical at times  Recall: did not tolerate higher dose of Imdur, doing fine with 30 mg    Heart palpitations April 2021:  Event monitor showed rare PACs with a burden of less than 1%    Normal EF no significant valve issues per echo in 3/23/2021    HTN  Controlled with current therapy         HLD  3/2021 LDL 90 we switched from atorvastatin to rosuvastatin  Lab Results   Component Value Date/Time    Cholesterol, total 179 03/09/2021 09:23 AM    HDL Cholesterol 56 03/09/2021 09:23 AM    LDL, calculated 90 03/09/2021 09:23 AM    LDL, calculated 125.6 (H) 04/27/2017 03:41 AM    VLDL, calculated 33 03/09/2021 09:23 AM    VLDL, calculated 31.4 04/27/2017 03:41 AM    Triglyceride 198 (H) 03/09/2021 09:23 AM    CHOL/HDL Ratio 4.1 04/27/2017 03:41 AM   We will request labs from PCP and if none done within approximately the past year we will reorder and mail her a lab slip     Venous insufficiency  Followed by Dr Oleg Edwards, status post bilateral GSV ablation. Patient notes significant improvement in varicose veins, swelling, and pain.     Hx dizziness, resolving  She will call us for recurrent symptom     Counseled on diet and exercise- eventual goal of 30-60 minutes 5-7 times a week as per AHA guidelines. She is doing great, walking 3 miles daily without any exertional symptoms, and occasionally does some treadmill as well.       Follow up in 1 year, sooner as needed.      Flakita Sandoval MD

## 2022-06-21 ENCOUNTER — TELEPHONE (OUTPATIENT)
Dept: CARDIOLOGY CLINIC | Age: 67
End: 2022-06-21

## 2022-06-22 RX ORDER — AMLODIPINE BESYLATE 10 MG/1
TABLET ORAL
Qty: 90 TABLET | Refills: 0 | Status: SHIPPED | OUTPATIENT
Start: 2022-06-22 | End: 2022-09-08 | Stop reason: SDUPTHER

## 2022-06-23 ENCOUNTER — HOSPITAL ENCOUNTER (EMERGENCY)
Age: 67
Discharge: HOME | End: 2022-06-23
Attending: EMERGENCY MEDICINE
Payer: COMMERCIAL

## 2022-06-23 VITALS
WEIGHT: 142 LBS | DIASTOLIC BLOOD PRESSURE: 78 MMHG | OXYGEN SATURATION: 98 % | BODY MASS INDEX: 25.16 KG/M2 | HEART RATE: 84 BPM | RESPIRATION RATE: 18 BRPM | TEMPERATURE: 97.9 F | HEIGHT: 63 IN | SYSTOLIC BLOOD PRESSURE: 122 MMHG

## 2022-06-23 DIAGNOSIS — R04.0 EPISTAXIS: Primary | ICD-10-CM

## 2022-06-23 LAB
BASOPHILS # BLD: 0.1 K/UL (ref 0–0.1)
BASOPHILS NFR BLD: 1 % (ref 0–1)
DIFFERENTIAL METHOD BLD: ABNORMAL
EOSINOPHIL # BLD: 0.2 K/UL (ref 0–0.4)
EOSINOPHIL NFR BLD: 3 % (ref 0–7)
ERYTHROCYTE [DISTWIDTH] IN BLOOD BY AUTOMATED COUNT: 12.8 % (ref 11.5–14.5)
HCT VFR BLD AUTO: 33.5 % (ref 35–47)
HGB BLD-MCNC: 10.9 G/DL (ref 11.5–16)
IMM GRANULOCYTES # BLD AUTO: 0.1 K/UL (ref 0–0.04)
IMM GRANULOCYTES NFR BLD AUTO: 1 % (ref 0–0.5)
LYMPHOCYTES # BLD: 1.7 K/UL (ref 0.8–3.5)
LYMPHOCYTES NFR BLD: 23 % (ref 12–49)
MCH RBC QN AUTO: 30 PG (ref 26–34)
MCHC RBC AUTO-ENTMCNC: 32.5 G/DL (ref 30–36.5)
MCV RBC AUTO: 92.3 FL (ref 80–99)
MONOCYTES # BLD: 0.5 K/UL (ref 0–1)
MONOCYTES NFR BLD: 7 % (ref 5–13)
NEUTS SEG # BLD: 5 K/UL (ref 1.8–8)
NEUTS SEG NFR BLD: 65 % (ref 32–75)
NRBC # BLD: 0 K/UL (ref 0–0.01)
NRBC BLD-RTO: 0 PER 100 WBC
PLATELET # BLD AUTO: 214 K/UL (ref 150–400)
PMV BLD AUTO: 9.5 FL (ref 8.9–12.9)
RBC # BLD AUTO: 3.63 M/UL (ref 3.8–5.2)
WBC # BLD AUTO: 7.6 K/UL (ref 3.6–11)

## 2022-06-23 PROCEDURE — 96374 THER/PROPH/DIAG INJ IV PUSH: CPT

## 2022-06-23 PROCEDURE — 74011000250 HC RX REV CODE- 250: Performed by: EMERGENCY MEDICINE

## 2022-06-23 PROCEDURE — 74011250636 HC RX REV CODE- 250/636: Performed by: EMERGENCY MEDICINE

## 2022-06-23 PROCEDURE — 36415 COLL VENOUS BLD VENIPUNCTURE: CPT

## 2022-06-23 PROCEDURE — 96361 HYDRATE IV INFUSION ADD-ON: CPT

## 2022-06-23 PROCEDURE — 85025 COMPLETE CBC W/AUTO DIFF WBC: CPT

## 2022-06-23 PROCEDURE — 99284 EMERGENCY DEPT VISIT MOD MDM: CPT

## 2022-06-23 PROCEDURE — 74011250636 HC RX REV CODE- 250/636: Performed by: PHYSICIAN ASSISTANT

## 2022-06-23 PROCEDURE — 74011250637 HC RX REV CODE- 250/637: Performed by: PHYSICIAN ASSISTANT

## 2022-06-23 RX ORDER — OXYMETAZOLINE HCL 0.05 %
2 SPRAY, NON-AEROSOL (ML) NASAL
Status: COMPLETED | OUTPATIENT
Start: 2022-06-23 | End: 2022-06-23

## 2022-06-23 RX ORDER — ONDANSETRON 2 MG/ML
4 INJECTION INTRAMUSCULAR; INTRAVENOUS
Status: COMPLETED | OUTPATIENT
Start: 2022-06-23 | End: 2022-06-23

## 2022-06-23 RX ORDER — LIDOCAINE HYDROCHLORIDE 20 MG/ML
10 SOLUTION OROPHARYNGEAL
Status: COMPLETED | OUTPATIENT
Start: 2022-06-23 | End: 2022-06-23

## 2022-06-23 RX ADMIN — ONDANSETRON 4 MG: 2 INJECTION INTRAMUSCULAR; INTRAVENOUS at 16:27

## 2022-06-23 RX ADMIN — LIDOCAINE HYDROCHLORIDE 10 ML: 20 SOLUTION ORAL at 16:28

## 2022-06-23 RX ADMIN — OXYMETAZOLINE HCL 2 SPRAY: 0.05 SPRAY NASAL at 16:27

## 2022-06-23 RX ADMIN — Medication 20000 UNITS: at 16:28

## 2022-06-23 RX ADMIN — SODIUM CHLORIDE 500 ML: 9 INJECTION, SOLUTION INTRAVENOUS at 16:27

## 2022-06-23 NOTE — DISCHARGE INSTRUCTIONS
It was a pleasure taking care of you at [facility] Emergency Department today. We know that when you come to [facility], you are entrusting us with your health, comfort, and safety. Our physicians and nurses honor that trust, and we truly appreciate the opportunity to care for you and your loved ones. We also value your feedback. If you receive a survey about your Emergency Department experience today, please fill it out. We care about our patients' feedback, and we listen to what you have to say. Thank you!

## 2022-06-23 NOTE — ED PROVIDER NOTES
EMERGENCY DEPARTMENT HISTORY AND PHYSICAL EXAM      Date: 6/23/2022  Patient Name: Arya Espinosa    History of Presenting Illness     Chief Complaint   Patient presents with    Epistaxis     Nose bleed onset 45 minutes ago - clamp in in triage is not controlling the bleedingshe had DCR surgery to open a blocked duct ; she is on Brilinta; she had been off a week when she had the surgery       History Provided By: Patient    HPI: Arya Espinosa, 61 y.o. female with a past medical history significant for problems as stated below, on Brilinta presents to the ED with cc of severe nosebleed over the last 45 minutes. Patient reports that she had surgery over a month ago of her nasolacrimal duct on the right side. Bleeding started today and she has not been able to stop it with direct pressure. She is not having lightheadedness or dizziness. No other associated symptoms. No other exacerbating or mounting factors. There are no other complaints, changes, or physical findings at this time. PCP: Bernie Villanueva MD    No current facility-administered medications on file prior to encounter. Current Outpatient Medications on File Prior to Encounter   Medication Sig Dispense Refill    amLODIPine (NORVASC) 10 mg tablet TAKE 1 TABLET BY MOUTH EVERY DAY 90 Tablet 0    rosuvastatin (CRESTOR) 40 mg tablet TAKE 1 TABLET BY MOUTH EVERY DAY AT NIGHT 90 Tablet 0    ticagrelor (Brilinta) 60 mg tab tablet Take 1 Tablet by mouth two (2) times a day. 180 Tablet 0    metoprolol succinate (TOPROL-XL) 50 mg XL tablet Take 1 Tablet by mouth daily. 90 Tablet 3    isosorbide mononitrate ER (IMDUR) 30 mg tablet TAKE 1 TABLET BY MOUTH EVERY DAY TO PREVENT CHEST PAIN, CALL MD IF SIGNIFICANT HEADACHE 90 Tablet 3    nitroglycerin (NITROSTAT) 0.4 mg SL tablet 1 Tab by SubLINGual route every five (5) minutes as needed for Chest Pain. Up to 3 doses. 30 Tab 1    aspirin 81 mg chewable tablet Take 1 Tab by mouth daily.  30 Tab 6       Past History     Past Medical History:  Past Medical History:   Diagnosis Date    CAD (coronary artery disease)     Hypertension        Past Surgical History:  Past Surgical History:   Procedure Laterality Date    HX CORONARY STENT PLACEMENT  04/26/2017    HX GYN      vaginal wall repair    HX OTHER SURGICAL      surgery for fissure    ND BREAST SURGERY PROCEDURE UNLISTED      cystectomy x2       Family History:  Family History   Problem Relation Age of Onset    Heart Disease Mother     Heart Disease Father        Social History:  Social History     Tobacco Use    Smoking status: Never Smoker    Smokeless tobacco: Never Used   Vaping Use    Vaping Use: Never used   Substance Use Topics    Alcohol use: Yes     Comment: 5-6 beers a week    Drug use: Yes     Types: Prescription       Allergies: Allergies   Allergen Reactions    Codeine Nausea and Vomiting         Review of Systems   Review of Systems   Constitutional: Negative for chills, diaphoresis, fatigue and fever. HENT: Positive for nosebleeds. Negative for ear pain and sore throat. Eyes: Negative for pain and redness. Respiratory: Negative for cough and shortness of breath. Cardiovascular: Negative for chest pain and leg swelling. Gastrointestinal: Negative for abdominal pain, diarrhea, nausea and vomiting. Endocrine: Negative for cold intolerance and heat intolerance. Genitourinary: Negative for flank pain and hematuria. Musculoskeletal: Negative for back pain and neck stiffness. Skin: Negative for rash and wound. Neurological: Negative for dizziness, syncope and headaches. All other systems reviewed and are negative. Physical Exam   Physical Exam  Vitals and nursing note reviewed. Constitutional:       General: She is in acute distress. Appearance: She is well-developed. She is not ill-appearing. Comments: Acute distress secondary to nosebleed   HENT:      Head: Normocephalic and atraumatic.       Nose: Comments: Patient has profuse bleeding and clots from the right nare, appears to be anterior nosebleed from the [place] box plexus. Mild bleeding down the posterior pharynx. No bleeding from the left nare. Mouth/Throat:      Pharynx: No oropharyngeal exudate. Eyes:      Conjunctiva/sclera: Conjunctivae normal.      Pupils: Pupils are equal, round, and reactive to light. Cardiovascular:      Rate and Rhythm: Normal rate and regular rhythm. Heart sounds: No murmur heard. Pulmonary:      Effort: Pulmonary effort is normal. No respiratory distress. Breath sounds: Normal breath sounds. No wheezing. Abdominal:      General: Bowel sounds are normal. There is no distension. Palpations: Abdomen is soft. Tenderness: There is no abdominal tenderness. Musculoskeletal:         General: No deformity. Normal range of motion. Cervical back: Normal range of motion. Skin:     General: Skin is warm and dry. Findings: No rash. Neurological:      Mental Status: She is alert and oriented to person, place, and time. Coordination: Coordination normal.   Psychiatric:         Behavior: Behavior normal.         Diagnostic Study Results     Labs -     Recent Results (from the past 24 hour(s))   CBC WITH AUTOMATED DIFF    Collection Time: 06/23/22  4:14 PM   Result Value Ref Range    WBC 7.6 3.6 - 11.0 K/uL    RBC 3.63 (L) 3.80 - 5.20 M/uL    HGB 10.9 (L) 11.5 - 16.0 g/dL    HCT 33.5 (L) 35.0 - 47.0 %    MCV 92.3 80.0 - 99.0 FL    MCH 30.0 26.0 - 34.0 PG    MCHC 32.5 30.0 - 36.5 g/dL    RDW 12.8 11.5 - 14.5 %    PLATELET 126 623 - 520 K/uL    MPV 9.5 8.9 - 12.9 FL    NRBC 0.0 0  WBC    ABSOLUTE NRBC 0.00 0.00 - 0.01 K/uL    NEUTROPHILS 65 32 - 75 %    LYMPHOCYTES 23 12 - 49 %    MONOCYTES 7 5 - 13 %    EOSINOPHILS 3 0 - 7 %    BASOPHILS 1 0 - 1 %    IMMATURE GRANULOCYTES 1 (H) 0.0 - 0.5 %    ABS. NEUTROPHILS 5.0 1.8 - 8.0 K/UL    ABS. LYMPHOCYTES 1.7 0.8 - 3.5 K/UL    ABS. MONOCYTES 0.5 0.0 - 1.0 K/UL    ABS. EOSINOPHILS 0.2 0.0 - 0.4 K/UL    ABS. BASOPHILS 0.1 0.0 - 0.1 K/UL    ABS. IMM. GRANS. 0.1 (H) 0.00 - 0.04 K/UL    DF AUTOMATED         Radiologic Studies -   No orders to display     CT Results  (Last 48 hours)    None        CXR Results  (Last 48 hours)    None            Medical Decision Making   I am the first provider for this patient. I reviewed the vital signs, available nursing notes, past medical history, past surgical history, family history and social history. Vital Signs-Reviewed the patient's vital signs. Patient Vitals for the past 12 hrs:   Temp Pulse Resp BP   06/23/22 1336 97.9 °F (36.6 °C) (!) 106 18 130/89       Records Reviewed: Nursing records and medical records reviewed    MDM:      Provider Notes (Medical Decision Making):   Is a 53-year-old female presenting with severe nosebleed. Patient became lightheaded and we eventually gave some IV fluids and put patient on the monitor as we stopped her nosebleed with medications shown below. She did not require nasal packing was observed for several hours with no recurrent bleeds. She will follow-up with her outpatient surgeon, return to the ER with any recurrent bleeding. ED Course:   Initial assessment performed. The patients presenting problems have been discussed, and they are in agreement with the care plan formulated and outlined with them. I have encouraged them to ask questions as they arise throughout their visit. ED Course as of 06/23/22 1655   Thu Jun 23, 2022   8256 Procedure Note - Epistaxis Management:   4:54 PM  Performed by: Myself. Complexity: Moderate  After administration of phenylephrine, lidocaine, and topical thrombin, the patient underwent packed with cotton pledgets with the above-mentioned medications for 1 hour. Hemostasis was achieved after placement. Estimated blood loss:  Moderate, 200 cc  The procedure took 16-30 minutes, and pt tolerated well   [CC]      ED Course User Index  [CC] James Reed MD       Medications Administered     lidocaine (XYLOCAINE) 2 % viscous solution 10 mL     Admin Date  06/23/2022 Action  Given Dose  10 mL Route  Mouth/Throat Administered By  Kika Tovar RN          [facility]etron [facility] injection 4 mg     Admin Date  06/23/2022 Action  Given Dose  4 mg Route  IntraVENous Administered By  Kika Tovar RN          oxymetazoline (AFRIN) 0.05 % nasal spray 2 Spray     Admin Date  06/23/2022 Action  Given Dose  2 [place] Route  Right Nostril Administered By  Kika Tovar RN          sodium chloride 0.9 % bolus infusion 500 mL     Admin Date  06/23/2022 Action  New Bag Dose  500 mL Rate  500 mL/hr Route  IntraVENous Administered By  Kika Tovar RN          thrombin (bovine) (THROMBIN-JMI) 5,000 unit topical solution 20,000 Units     Admin Date  06/23/2022 Action  Given Dose  20,000 Units Route  Both Nostrils Administered By  Kika Tovar RN                    Critical Care:  None      Disposition:  3:24 PM  Red David  results have been reviewed with her. She has been counseled regarding her diagnosis. She verbally conveys understanding and agreement of the signs, symptoms, diagnosis, treatment and prognosis and additionally agrees to follow up as recommended with Dr. Dione Gupta MD in 25 - 48 hours. She also agrees with the care-plan and conveys that all of her questions have been answered. I have also put together some discharge instructions for her that include: 1) educational information regarding their diagnosis, 2) how to care for their diagnosis at home, as well a 3) list of reasons why they would want to return to the ED prior to their follow-up appointment, should their condition change. DISCHARGE PLAN:  1. Current Discharge Medication List        2. Follow-up Information     Follow up With Specialties Details Why [address]  Doctor  In 1 day For a follow-up evaluation. PLEASE CALL FIRST THING IN THE MORNING.     [facility] EMERGENCY DEPT Emergency Medicine In 1 day If symptoms worsen to include any re-bleeding. Drink cold beverages, use afrin as instructed, and use saline mist spray 4-5 times per day. [address] [place] [address]  [phone]        3. Return to ED if worse     Diagnosis     Clinical Impression:   1. Epistaxis        Attestations:    Julio Cesar Bernal MD    Please note that this dictation was completed with [employer], the computer voice recognition software. Quite often unanticipated grammatical, syntax, homophones, and other interpretive errors are inadvertently transcribed by the computer software. Please disregard these errors. Please excuse any errors that have escaped final proofreading. Thank you.

## 2022-07-10 NOTE — TELEPHONE ENCOUNTER
Anita-please request most recent lipid panel and CMP from PCP. If there is not been 1 in the last year, please advise the patient we need to check these things fasting and mail her a lab slip.

## 2022-07-11 ENCOUNTER — TELEPHONE (OUTPATIENT)
Dept: CARDIOLOGY CLINIC | Age: 67
End: 2022-07-11

## 2022-07-11 NOTE — TELEPHONE ENCOUNTER
Request for latest blood work faxed to PCP, no labs done in PCP's office in 2022. Patient was called, verified with 2 identifiers. Ms. Karrie Funes will have lab work( has slips at home) as soon as possible stated.

## 2022-09-08 ENCOUNTER — TELEPHONE (OUTPATIENT)
Dept: CARDIOLOGY CLINIC | Age: 67
End: 2022-09-08

## 2022-09-08 RX ORDER — AMLODIPINE BESYLATE 10 MG/1
10 TABLET ORAL DAILY
Qty: 90 TABLET | Refills: 0 | Status: SHIPPED | OUTPATIENT
Start: 2022-09-08

## 2022-09-08 RX ORDER — ROSUVASTATIN CALCIUM 40 MG/1
40 TABLET, COATED ORAL
Qty: 90 TABLET | Refills: 1 | Status: SHIPPED | OUTPATIENT
Start: 2022-09-08

## 2022-09-08 NOTE — TELEPHONE ENCOUNTER
----- Message from Margo Christensen NP sent at 9/8/2022 11:59 AM EDT -----  Lipid at goal, lytes/kidney/liver fxn ok
Left msge on voice mail to call office back at [phone]
Verified Patient with two identifiers. Spoke with patient regarding results and recommendations. Patient voiced understanding.
chest

## 2022-10-29 ENCOUNTER — APPOINTMENT (OUTPATIENT)
Dept: GENERAL RADIOLOGY | Age: 67
End: 2022-10-29
Attending: EMERGENCY MEDICINE
Payer: COMMERCIAL

## 2022-10-29 ENCOUNTER — HOSPITAL ENCOUNTER (EMERGENCY)
Age: 67
Discharge: HOME | End: 2022-10-29
Attending: EMERGENCY MEDICINE
Payer: COMMERCIAL

## 2022-10-29 ENCOUNTER — APPOINTMENT (OUTPATIENT)
Dept: CT IMAGING | Age: 67
End: 2022-10-29
Attending: EMERGENCY MEDICINE
Payer: COMMERCIAL

## 2022-10-29 VITALS
RESPIRATION RATE: 18 BRPM | HEART RATE: 109 BPM | DIASTOLIC BLOOD PRESSURE: 80 MMHG | HEIGHT: 63 IN | WEIGHT: 140.87 LBS | TEMPERATURE: 98.3 F | BODY MASS INDEX: 24.96 KG/M2 | SYSTOLIC BLOOD PRESSURE: 159 MMHG | OXYGEN SATURATION: 98 %

## 2022-10-29 DIAGNOSIS — E87.6 HYPOKALEMIA: ICD-10-CM

## 2022-10-29 DIAGNOSIS — R11.2 NAUSEA AND VOMITING IN ADULT: ICD-10-CM

## 2022-10-29 DIAGNOSIS — K52.9 ACUTE COLITIS: Primary | ICD-10-CM

## 2022-10-29 LAB
ALBUMIN SERPL-MCNC: 3.7 G/DL (ref 3.5–5)
ALBUMIN/GLOB SERPL: 0.9 {RATIO} (ref 1.1–2.2)
ALP SERPL-CCNC: 107 U/L (ref 45–117)
ALT SERPL-CCNC: 56 U/L (ref 12–78)
ANION GAP SERPL CALC-SCNC: 12 MMOL/L (ref 5–15)
APPEARANCE UR: ABNORMAL
AST SERPL-CCNC: 34 U/L (ref 15–37)
BACTERIA URNS QL MICRO: NEGATIVE /HPF
BASE DEFICIT BLD-SCNC: 0.3 MMOL/L
BASOPHILS # BLD: 0 K/UL (ref 0–0.1)
BASOPHILS NFR BLD: 0 % (ref 0–1)
BILIRUB SERPL-MCNC: 0.5 MG/DL (ref 0.2–1)
BILIRUB UR QL: NEGATIVE
BUN SERPL-MCNC: 8 MG/DL (ref 6–20)
BUN/CREAT SERPL: 9 (ref 12–20)
CA-I BLD-MCNC: 1.16 MMOL/L (ref 1.12–1.32)
CALCIUM SERPL-MCNC: 9.3 MG/DL (ref 8.5–10.1)
CHLORIDE BLD-SCNC: 98 MMOL/L (ref 100–108)
CHLORIDE SERPL-SCNC: 98 MMOL/L (ref 97–108)
CO2 BLD-SCNC: 21 MMOL/L (ref 19–24)
CO2 SERPL-SCNC: 22 MMOL/L (ref 21–32)
COLOR UR: ABNORMAL
CREAT SERPL-MCNC: 0.88 MG/DL (ref 0.55–1.02)
CREAT UR-MCNC: 0.8 MG/DL (ref 0.6–1.3)
DIFFERENTIAL METHOD BLD: NORMAL
EOSINOPHIL # BLD: 0 K/UL (ref 0–0.4)
EOSINOPHIL NFR BLD: 0 % (ref 0–7)
EPITH CASTS URNS QL MICRO: ABNORMAL /LPF
ERYTHROCYTE [DISTWIDTH] IN BLOOD BY AUTOMATED COUNT: 12.7 % (ref 11.5–14.5)
GLOBULIN SER CALC-MCNC: 4.3 G/DL (ref 2–4)
GLUCOSE BLD STRIP.AUTO-MCNC: 157 MG/DL (ref 74–106)
GLUCOSE SERPL-MCNC: 149 MG/DL (ref 65–100)
GLUCOSE UR STRIP.AUTO-MCNC: NEGATIVE MG/DL
HCO3 BLDA-SCNC: 23 MMOL/L
HCT VFR BLD AUTO: 38.3 % (ref 35–47)
HGB BLD-MCNC: 13.5 G/DL (ref 11.5–16)
HGB UR QL STRIP: ABNORMAL
IMM GRANULOCYTES # BLD AUTO: 0 K/UL (ref 0–0.04)
IMM GRANULOCYTES NFR BLD AUTO: 0 % (ref 0–0.5)
KETONES UR QL STRIP.AUTO: NEGATIVE MG/DL
LACTATE BLD-SCNC: 1.07 MMOL/L (ref 0.4–2)
LEUKOCYTE ESTERASE UR QL STRIP.AUTO: NEGATIVE
LIPASE SERPL-CCNC: 79 U/L (ref 73–393)
LYMPHOCYTES # BLD: 1.2 K/UL (ref 0.8–3.5)
LYMPHOCYTES NFR BLD: 22 % (ref 12–49)
MAGNESIUM SERPL-MCNC: 2 MG/DL (ref 1.6–2.4)
MCH RBC QN AUTO: 29.8 PG (ref 26–34)
MCHC RBC AUTO-ENTMCNC: 35.2 G/DL (ref 30–36.5)
MCV RBC AUTO: 84.5 FL (ref 80–99)
MONOCYTES # BLD: 0.7 K/UL (ref 0–1)
MONOCYTES NFR BLD: 12 % (ref 5–13)
NEUTS BAND NFR BLD MANUAL: 8 %
NEUTS SEG # BLD: 3.7 K/UL (ref 1.8–8)
NEUTS SEG NFR BLD: 58 % (ref 32–75)
NITRITE UR QL STRIP.AUTO: NEGATIVE
NRBC # BLD: 0 K/UL (ref 0–0.01)
NRBC BLD-RTO: 0 PER 100 WBC
PCO2 BLDV: 31.3 MMHG (ref 41–51)
PH BLDV: 7.47 [PH] (ref 7.32–7.42)
PH UR STRIP: 5.5 [PH] (ref 5–8)
PLATELET # BLD AUTO: 188 K/UL (ref 150–400)
PMV BLD AUTO: 10 FL (ref 8.9–12.9)
PO2 BLDV: 55 MMHG (ref 25–40)
POTASSIUM BLD-SCNC: 3.2 MMOL/L (ref 3.5–5.5)
POTASSIUM SERPL-SCNC: 3.2 MMOL/L (ref 3.5–5.1)
PROT SERPL-MCNC: 8 G/DL (ref 6.4–8.2)
PROT UR STRIP-MCNC: ABNORMAL MG/DL
RBC # BLD AUTO: 4.53 M/UL (ref 3.8–5.2)
RBC #/AREA URNS HPF: ABNORMAL /HPF (ref 0–5)
RBC MORPH BLD: NORMAL
SODIUM BLD-SCNC: 135 MMOL/L (ref 136–145)
SODIUM SERPL-SCNC: 132 MMOL/L (ref 136–145)
SP GR UR REFRACTOMETRY: 1.02 (ref 1–1.03)
SPECIMEN SITE: ABNORMAL
UA: UC IF INDICATED,UAUC: ABNORMAL
UROBILINOGEN UR QL STRIP.AUTO: 0.2 EU/DL (ref 0.2–1)
WBC # BLD AUTO: 5.6 K/UL (ref 3.6–11)
WBC URNS QL MICRO: ABNORMAL /HPF (ref 0–4)

## 2022-10-29 PROCEDURE — 70450 CT HEAD/BRAIN W/O DYE: CPT

## 2022-10-29 PROCEDURE — 89055 LEUKOCYTE ASSESSMENT FECAL: CPT

## 2022-10-29 PROCEDURE — 96361 HYDRATE IV INFUSION ADD-ON: CPT

## 2022-10-29 PROCEDURE — 87324 CLOSTRIDIUM AG IA: CPT

## 2022-10-29 PROCEDURE — 80053 COMPREHEN METABOLIC PANEL: CPT

## 2022-10-29 PROCEDURE — 96375 TX/PRO/DX INJ NEW DRUG ADDON: CPT

## 2022-10-29 PROCEDURE — 99285 EMERGENCY DEPT VISIT HI MDM: CPT

## 2022-10-29 PROCEDURE — 96374 THER/PROPH/DIAG INJ IV PUSH: CPT

## 2022-10-29 PROCEDURE — 81001 URINALYSIS AUTO W/SCOPE: CPT

## 2022-10-29 PROCEDURE — 74011250637 HC RX REV CODE- 250/637: Performed by: EMERGENCY MEDICINE

## 2022-10-29 PROCEDURE — 83690 ASSAY OF LIPASE: CPT

## 2022-10-29 PROCEDURE — 87040 BLOOD CULTURE FOR BACTERIA: CPT

## 2022-10-29 PROCEDURE — 83735 ASSAY OF MAGNESIUM: CPT

## 2022-10-29 PROCEDURE — 85025 COMPLETE CBC W/AUTO DIFF WBC: CPT

## 2022-10-29 PROCEDURE — 36415 COLL VENOUS BLD VENIPUNCTURE: CPT

## 2022-10-29 PROCEDURE — 74177 CT ABD & PELVIS W/CONTRAST: CPT

## 2022-10-29 PROCEDURE — 74011000636 HC RX REV CODE- 636: Performed by: EMERGENCY MEDICINE

## 2022-10-29 PROCEDURE — 82947 ASSAY GLUCOSE BLOOD QUANT: CPT

## 2022-10-29 PROCEDURE — 87506 IADNA-DNA/RNA PROBE TQ 6-11: CPT

## 2022-10-29 PROCEDURE — 74011250636 HC RX REV CODE- 250/636: Performed by: PHYSICIAN ASSISTANT

## 2022-10-29 PROCEDURE — 74011250636 HC RX REV CODE- 250/636: Performed by: EMERGENCY MEDICINE

## 2022-10-29 PROCEDURE — 71045 X-RAY EXAM CHEST 1 VIEW: CPT

## 2022-10-29 RX ORDER — METRONIDAZOLE 250 MG/1
500 TABLET ORAL
Status: COMPLETED | OUTPATIENT
Start: 2022-10-29 | End: 2022-10-29

## 2022-10-29 RX ORDER — CIPROFLOXACIN 500 MG/1
500 TABLET ORAL 2 TIMES DAILY
Qty: 20 TABLET | Refills: 0 | Status: SHIPPED | OUTPATIENT
Start: 2022-10-29 | End: 2022-11-08

## 2022-10-29 RX ORDER — FENTANYL CITRATE 50 UG/ML
25 INJECTION, SOLUTION INTRAMUSCULAR; INTRAVENOUS
Status: COMPLETED | OUTPATIENT
Start: 2022-10-29 | End: 2022-10-29

## 2022-10-29 RX ORDER — ONDANSETRON 2 MG/ML
4 INJECTION INTRAMUSCULAR; INTRAVENOUS
Status: COMPLETED | OUTPATIENT
Start: 2022-10-29 | End: 2022-10-29

## 2022-10-29 RX ORDER — METRONIDAZOLE 500 MG/1
500 TABLET ORAL 3 TIMES DAILY
Qty: 30 TABLET | Refills: 0 | Status: SHIPPED | OUTPATIENT
Start: 2022-10-29 | End: 2022-11-08

## 2022-10-29 RX ORDER — PROCHLORPERAZINE MALEATE 5 MG
5 TABLET ORAL
Qty: 12 TABLET | Refills: 0 | Status: SHIPPED | OUTPATIENT
Start: 2022-10-29 | End: 2022-11-05

## 2022-10-29 RX ORDER — CIPROFLOXACIN 500 MG/1
500 TABLET ORAL
Status: COMPLETED | OUTPATIENT
Start: 2022-10-29 | End: 2022-10-29

## 2022-10-29 RX ORDER — POTASSIUM CHLORIDE 750 MG/1
10 TABLET, FILM COATED, EXTENDED RELEASE ORAL DAILY
Qty: 4 TABLET | Refills: 0 | Status: SHIPPED | OUTPATIENT
Start: 2022-10-29

## 2022-10-29 RX ADMIN — METRONIDAZOLE 500 MG: 250 TABLET ORAL at 15:07

## 2022-10-29 RX ADMIN — IOPAMIDOL 100 ML: 755 INJECTION, SOLUTION INTRAVENOUS at 13:37

## 2022-10-29 RX ADMIN — FENTANYL CITRATE 25 MCG: 50 INJECTION, SOLUTION INTRAMUSCULAR; INTRAVENOUS at 13:09

## 2022-10-29 RX ADMIN — ONDANSETRON 4 MG: 2 INJECTION INTRAMUSCULAR; INTRAVENOUS at 13:09

## 2022-10-29 RX ADMIN — CIPROFLOXACIN 500 MG: 500 TABLET, FILM COATED ORAL at 15:07

## 2022-10-29 RX ADMIN — SODIUM CHLORIDE 1000 ML: 9 INJECTION, SOLUTION INTRAVENOUS at 13:09

## 2022-10-29 NOTE — ED PROVIDER NOTES
EMERGENCY DEPARTMENT HISTORY AND PHYSICAL EXAM      Date: 10/29/2022  Patient Name: Neema Lomas    History of Presenting Illness     Chief Complaint   Patient presents with    Nausea     Pt ambulatory into triage with a cc of nausea, diarrhea and fever x 4 days; pt was seen at [facility] on Wednesday and had negative covid and flu results; pt states the nausea has worsened       History Provided By: Patient    HPI: Neema Lomas, 74 y.o. female presents to the ED with cc of nausea, diarrhea and fever. Patient states she had a temperature of 104.1 3 days ago. Seen at urgent care 2 days ago, had a negative COVID and negative flu test.  She has been vaccinated against COVID-19. She has had increased diarrhea since last night. She says she is going to the bathroom every 30 minutes and having a watery stool. She denies any recent antibiotic use or long distance travel. She denies abdominal pain, but states she has had a headache since Thursday night. She said that is a 6 out of 10 in severity. She denies neck pain or trauma. She  denies cough, dysuria. She was prescribed Zofran for nausea, and says that has helped. She last took Tylenol at 10 AM.  Patient has chronic back pain. There are no other complaints, changes, or physical findings at this time. PCP: Paxton West MD    No current facility-administered medications on file prior to encounter. Current Outpatient Medications on File Prior to Encounter   Medication Sig Dispense Refill    amLODIPine (NORVASC) 10 mg tablet Take 1 Tablet by mouth daily. 90 Tablet 0    rosuvastatin (CRESTOR) 40 mg tablet Take 1 Tablet by mouth nightly. 90 Tablet 1    Brilinta 60 mg tab tablet TAKE 1 TABLET BY MOUTH TWO TIMES A DAY. 180 Tablet 1    metoprolol succinate (TOPROL-XL) 50 mg XL tablet Take 1 Tablet by mouth daily.  90 Tablet 3    isosorbide mononitrate ER (IMDUR) 30 mg tablet TAKE 1 TABLET BY MOUTH EVERY DAY TO PREVENT CHEST PAIN, CALL MD IF SIGNIFICANT HEADACHE 90 Tablet 3    nitroglycerin (NITROSTAT) 0.4 mg SL tablet 1 Tab by SubLINGual route every five (5) minutes as needed for Chest Pain. Up to 3 doses. 30 Tab 1    aspirin 81 mg chewable tablet Take 1 Tab by mouth daily. 27 Tab 6       Past History     Past Medical History:  Past Medical History:   Diagnosis Date    CAD (coronary artery disease)     Hypertension        Past Surgical History:  Past Surgical History:   Procedure Laterality Date    HX CORONARY STENT PLACEMENT  04/26/2017    HX GYN      vaginal wall repair    HX OTHER SURGICAL      surgery for fissure    GA BREAST SURGERY PROCEDURE UNLISTED      cystectomy x2       Family History:  Family History   Problem Relation Age of Onset    Heart Disease Mother     Heart Disease Father        Social History:  Social History     Tobacco Use    Smoking status: Never    Smokeless tobacco: Never   Vaping Use    Vaping Use: Never used   Substance Use Topics    Alcohol use: Yes     Comment: 5-6 beers a week    Drug use: Yes     Types: Prescription       Allergies: Allergies   Allergen Reactions    Codeine Nausea and Vomiting         Review of Systems   Review of Systems   Constitutional:  Positive for fever. HENT:  Negative for congestion. Eyes: Negative. Respiratory:  Negative for shortness of breath. Cardiovascular:  Negative for chest pain. Gastrointestinal:  Positive for diarrhea and nausea. Negative for abdominal pain. Endocrine: Negative for heat intolerance. Genitourinary: Negative. Musculoskeletal:  Positive for back pain. Skin:  Negative for rash. Allergic/Immunologic: Negative for immunocompromised state. Neurological:  Positive for light-headedness and headaches. Hematological:  Does not bruise/bleed easily. Psychiatric/Behavioral: Negative. All other systems reviewed and are negative. Physical Exam   Physical Exam  Vitals and nursing note reviewed.    Constitutional:       General: She is not in acute distress. Appearance: She is well-developed. HENT:      Head: Normocephalic. Cardiovascular:      Rate and Rhythm: Regular rhythm. Tachycardia present. Pulses: Normal pulses. Heart sounds: Normal heart sounds. Pulmonary:      Effort: Pulmonary effort is normal.      Breath sounds: Normal breath sounds. Abdominal:      General: Bowel sounds are normal.      Palpations: Abdomen is soft. Tenderness: There is no abdominal tenderness. Musculoskeletal:         General: Normal range of motion. Cervical back: Normal range of motion and neck supple. Skin:     General: Skin is warm and dry. Neurological:      General: No focal deficit present. Mental Status: She is alert and oriented to person, place, and time. Psychiatric:         Mood and Affect: Mood normal.         Behavior: Behavior normal.       Diagnostic Study Results     Labs -     Recent Results (from the past 12 hour(s))   CBC WITH AUTOMATED DIFF    Collection Time: 10/29/22 12:56 PM   Result Value Ref Range    WBC 5.6 3.6 - 11.0 K/uL    RBC 4.53 3.80 - 5.20 M/uL    HGB 13.5 11.5 - 16.0 g/dL    HCT 38.3 35.0 - 47.0 %    MCV 84.5 80.0 - 99.0 FL    MCH 29.8 26.0 - 34.0 PG    MCHC 35.2 30.0 - 36.5 g/dL    RDW 12.7 11.5 - 14.5 %    PLATELET 675 603 - 758 K/uL    MPV 10.0 8.9 - 12.9 FL    NRBC 0.0 0  WBC    ABSOLUTE NRBC 0.00 0.00 - 0.01 K/uL    NEUTROPHILS 58 32 - 75 %    BAND NEUTROPHILS 8 %    LYMPHOCYTES 22 12 - 49 %    MONOCYTES 12 5 - 13 %    EOSINOPHILS 0 0 - 7 %    BASOPHILS 0 0 - 1 %    IMMATURE GRANULOCYTES 0 0.0 - 0.5 %    ABS. NEUTROPHILS 3.7 1.8 - 8.0 K/UL    ABS. LYMPHOCYTES 1.2 0.8 - 3.5 K/UL    ABS. MONOCYTES 0.7 0.0 - 1.0 K/UL    ABS. EOSINOPHILS 0.0 0.0 - 0.4 K/UL    ABS. BASOPHILS 0.0 0.0 - 0.1 K/UL    ABS. IMM.  GRANS. 0.0 0.00 - 0.04 K/UL    DF MANUAL      RBC COMMENTS NORMOCYTIC, NORMOCHROMIC     METABOLIC PANEL, COMPREHENSIVE    Collection Time: 10/29/22 12:56 PM   Result Value Ref Range    Sodium 132 (L) 136 - 145 mmol/L    Potassium 3.2 (L) 3.5 - 5.1 mmol/L    Chloride 98 97 - 108 mmol/L    CO2 22 21 - 32 mmol/L    Anion gap 12 5 - 15 mmol/L    Glucose 149 (H) 65 - 100 mg/dL    BUN 8 6 - 20 MG/DL    Creatinine 0.88 0.55 - 1.02 MG/DL    BUN/Creatinine ratio 9 (L) 12 - 20      eGFR >60 >60 ml/min/1.73m2    Calcium 9.3 8.5 - 10.1 MG/DL    Bilirubin, total 0.5 0.2 - 1.0 MG/DL    ALT (SGPT) 56 12 - 78 U/L    AST (SGOT) 34 15 - 37 U/L    Alk. phosphatase 107 45 - 117 U/L    Protein, total 8.0 6.4 - 8.2 g/dL    Albumin 3.7 3.5 - 5.0 g/dL    Globulin 4.3 (H) 2.0 - 4.0 g/dL    A-G Ratio 0.9 (L) 1.1 - 2.2     LIPASE    Collection Time: 10/29/22 12:56 PM   Result Value Ref Range    Lipase 79 73 - 393 U/L   MAGNESIUM    Collection Time: 10/29/22 12:56 PM   Result Value Ref Range    Magnesium 2.0 1.6 - 2.4 mg/dL   BLOOD GAS,CHEM8,LACTIC ACID POC    Collection Time: 10/29/22  1:02 PM   Result Value Ref Range    Calcium, ionized (POC) 1.16 1.12 - 1.32 mmol/L    BICARBONATE 23 mmol/L    Base deficit (POC) 0.3 mmol/L    Sample source VENOUS BLOOD      CO2, POC 21 19 - 24 MMOL/L    Sodium,  (L) 136 - 145 MMOL/L    Potassium, POC 3.2 (L) 3.5 - 5.5 MMOL/L    Chloride, POC 98 (L) 100 - 108 MMOL/L    Glucose,  (H) 74 - 106 MG/DL    Creatinine, POC 0.8 0.6 - 1.3 MG/DL    Lactic Acid (POC) 1.07 0.40 - 2.00 mmol/L    pH, venous (POC) 7.47 (H) 7.32 - 7.42      pCO2, venous (POC) 31.3 (L) 41 - 51 MMHG    pO2, venous (POC) 55 (H) 25 - 40 mmHg       Radiologic Studies -   CT HEAD WO CONT   Final Result   Negative. CT ABD PELV W CONT   Final Result         1. Moderate acute focal colitis involving the splenic flexure of the colon. Mesenteric arteries are patent. 2. 11 mm renal artery aneurysm at the left renal hilum. 3. Probable sludge in the gallbladder.        XR CHEST PORT   Final Result      No acute process on portable chest.           CT Results  (Last 48 hours)                 10/29/22 0970 CT HEAD WO CONT Final result    Impression:  Negative. Narrative:  EXAM: CT HEAD WO CONT       INDICATION: pain       COMPARISON: None. CONTRAST: None. TECHNIQUE: Unenhanced CT of the head was performed using 5 mm images. Brain and   bone windows were generated. Coronal and sagittal reformats. CT dose reduction   was achieved through use of a standardized protocol tailored for this   examination and automatic exposure control for dose modulation. FINDINGS:   No extra-axial fluid collection hemorrhage shift or masses. 10/29/22 1337  CT ABD PELV W CONT Final result    Impression:          1. Moderate acute focal colitis involving the splenic flexure of the colon. Mesenteric arteries are patent. 2. 11 mm renal artery aneurysm at the left renal hilum. 3. Probable sludge in the gallbladder. Narrative:  INDICATION: Diarrhea and fever       COMPARISON: None       TECHNIQUE:    Thin axial images were obtained through the abdomen and pelvis following   intravenous iodinated contrast administration. Coronal and sagittal   reconstructions were generated. Oral contrast was not administered. CT dose   reduction was achieved through use of a standardized protocol tailored for this   examination and automatic exposure control for dose modulation. FINDINGS:        LIVER: No mass or biliary dilatation. GALLBLADDER: Probable sludge but otherwise unremarkable   SPLEEN: Unremarkable   PANCREAS: No mass or ductal dilatation. ADRENALS: Unremarkable. KIDNEYS/URETERS: Symmetric nephrograms with a couple low-density lesions too   small to characterize. No evidence for abnormal enhancing renal mass. No   hydronephrosis. Left renal hilum renal artery aneurysm measuring 11 mm    PERITONEUM: No abdominal lymphadenopathy or ascites. Mesenteric arteries are   patent without any significant stenosis.    COLON: Moderate focal acute colitis involving the splenic texture   APPENDIX: Unremarkable. SMALL BOWEL: No dilatation or wall thickening. STOMACH: Unremarkable. PELVIS: No pelvic lymphadenopathy or free fluid. BONES: No destructive bone lesion. VISUALIZED THORAX: No significant abnormality   ADDITIONAL COMMENTS: N/A                 CXR Results  (Last 48 hours)                 10/29/22 1324  XR CHEST PORT Final result    Impression:      No acute process on portable chest.           Narrative:  EXAM:  XR CHEST PORT       INDICATION: Cough fever       COMPARISON: March 17, 2021       TECHNIQUE: Upright portable chest AP view       FINDINGS: The cardiac silhouette is within normal limits. The pulmonary   vasculature is within normal limits. The lungs and pleural spaces are clear. The visualized bones and upper abdomen   are age-appropriate. Medical Decision Making   I am the first provider for this patient. I reviewed the vital signs, available nursing notes, past medical history, past surgical history, family history and social history. Vital Signs-Reviewed the patient's vital signs. Patient Vitals for the past 12 hrs:   Temp Pulse Resp BP SpO2   10/29/22 1352 -- (!) 109 18 (!) 159/80 98 %   10/29/22 1316 -- 100 -- -- --   10/29/22 1209 98.3 °F (36.8 °C) (!) 114 20 137/73 98 %         Records Reviewed: Nursing Notes, Old Medical Records, Previous Radiology Studies, and Previous Laboratory Studies    Provider Notes (Medical Decision Making):   t, dehydration, colitis, electrolyte abnormality, diverticulitis, tension headache, sepsis, UTI    ED Course:   Initial assessment performed. The patients presenting problems have been discussed, and they are in agreement with the care plan formulated and outlined with them. I have encouraged them to ask questions as they arise throughout their visit. Progress note: The patient is feeling better. Her results were reviewed.   She is advised to follow-up and return to ER if worse           Critical Care Time:   0      Disposition:  home    DISCHARGE PLAN:  1. Discharge Medication List as of 10/29/2022  3:32 PM        START taking these medications    Details   potassium chloride SR (KLOR-CON 10) 10 mEq tablet Take 1 Tablet by mouth daily. , Normal, Disp-4 Tablet, R-0      prochlorperazine (Compazine) 5 mg tablet Take 1 Tablet by mouth every eight (8) hours as needed for Nausea for up to 7 days. , Normal, Disp-12 Tablet, R-0      ciprofloxacin HCl (Cipro) 500 mg tablet Take 1 Tablet by mouth two (2) times a day for 10 days. , Normal, Disp-20 Tablet, R-0      metroNIDAZOLE (FlagyL) 500 mg tablet Take 1 Tablet by mouth three (3) times daily for 10 days. , Normal, Disp-30 Tablet, R-0           CONTINUE these medications which have NOT CHANGED    Details   amLODIPine (NORVASC) 10 mg tablet Take 1 Tablet by mouth daily. , Normal, Disp-90 Tablet, R-0      rosuvastatin (CRESTOR) 40 mg tablet Take 1 Tablet by mouth nightly., Normal, Disp-90 Tablet, R-1Labs needed prior to further refill      Brilinta 60 mg tab tablet TAKE 1 TABLET BY MOUTH TWO TIMES A DAY., Normal, Disp-180 Tablet, R-1      metoprolol succinate (TOPROL-XL) 50 mg XL tablet Take 1 Tablet by mouth daily. , Normal, Disp-90 Tablet, R-3      isosorbide mononitrate ER (IMDUR) 30 mg tablet TAKE 1 TABLET BY MOUTH EVERY DAY TO PREVENT CHEST PAIN, CALL MD IF SIGNIFICANT HEADACHE, Normal, Disp-90 Tablet, R-3      nitroglycerin (NITROSTAT) 0.4 mg SL tablet 1 Tab by SubLINGual route every five (5) minutes as needed for Chest Pain. Up to 3 doses. , Normal, Disp-30 Tab, R-1      aspirin 81 mg chewable tablet Take 1 Tab by mouth daily. , Print, Disp-30 Tab, R-6           2.    Follow-up Information       Follow up With Specialties Details Why Contact Info    Anne Marie Light MD Family Medicine  As needed [address]  [place] [phone]      Marcus Ramírez MD Gastroenterology  As needed [place] [id number]  [phone]      \[facility]\"" EMERGENCY DEPT Emergency Medicine  If symptoms worsen [address] [place] [address]  [phone]          3. Return to ED if worse     Diagnosis     Clinical Impression:   1. Acute colitis    2. Nausea and vomiting in adult    3. Hypokalemia        Attestations:    Quinn Lopez MD        Please note that this dictation was completed with KLab, the computer voice recognition software. Quite often unanticipated grammatical, syntax, homophones, and other interpretive errors are inadvertently transcribed by the computer software. Please disregard these errors. Please excuse any errors that have escaped final proofreading. Thank you.

## 2022-10-30 LAB
C DIFF GDH STL QL: NEGATIVE
C DIFF TOX A+B STL QL IA: NEGATIVE
CAMPYLOBACTER SPECIES, DNA: NEGATIVE
ENTEROTOXIGEN E COLI, DNA: NEGATIVE
INTERPRETATION: NORMAL
P SHIGELLOIDES DNA STL QL NAA+PROBE: NEGATIVE
SALMONELLA SPECIES, DNA: POSITIVE
SHIGA TOXIN PRODUCING, DNA: NEGATIVE
SHIGELLA SP+EIEC IPAH STL QL NAA+PROBE: NEGATIVE
VIBRIO SPECIES, DNA: NEGATIVE
WBC #/AREA STL HPF: NORMAL /HPF (ref 0–4)
Y. ENTEROCOLITICA, DNA: NEGATIVE

## 2022-10-31 NOTE — PROGRESS NOTES
Called patient and made her aware of positive salmonella result. She was discharged on Cipro Flagyl. Doing well. Discussed hygiene, transmission, and prevention tactics. Verbal return precautions advised.

## 2022-11-04 LAB
BACTERIA SPEC CULT: NORMAL
SERVICE CMNT-IMP: NORMAL

## 2022-12-16 RX ORDER — ISOSORBIDE MONONITRATE 30 MG/1
TABLET, EXTENDED RELEASE ORAL
Qty: 90 TABLET | Refills: 1 | Status: SHIPPED | OUTPATIENT
Start: 2022-12-16

## 2022-12-16 NOTE — TELEPHONE ENCOUNTER
PCP: Radha Horan MD    Last appt: 6/2022  Future Appointments   Date Time Provider George Koenig   6/19/2023  8:20 AM Juventino Cranker, Marjean Coyer, MD [facility]       Requested Prescriptions     Signed Prescriptions Disp Refills    isosorbide mononitrate ER (IMDUR) 30 mg tablet 90 Tablet 1     Sig: TAKE 1 TABLET BY MOUTH EVERY DAY TO PREVENT CHEST PAIN, CALL MD IF SIGNIFICANT HEADACHE     Authorizing Provider: Roger Burdick     Ordering User: CHRISTAL KENNY         Other Comments:  Verbal order per provider. Order (medication, dose, route, frequency, amount, refills) repeated and verified twice.

## 2023-01-12 ENCOUNTER — OFFICE VISIT (OUTPATIENT)
Dept: URGENT CARE | Age: 68
End: 2023-01-12
Payer: COMMERCIAL

## 2023-01-12 VITALS
DIASTOLIC BLOOD PRESSURE: 72 MMHG | RESPIRATION RATE: 17 BRPM | WEIGHT: 144 LBS | OXYGEN SATURATION: 99 % | TEMPERATURE: 98.1 F | SYSTOLIC BLOOD PRESSURE: 126 MMHG | HEART RATE: 86 BPM | BODY MASS INDEX: 25.51 KG/M2

## 2023-01-12 DIAGNOSIS — J20.8 ACUTE BACTERIAL BRONCHITIS: Primary | ICD-10-CM

## 2023-01-12 DIAGNOSIS — B96.89 ACUTE BACTERIAL BRONCHITIS: Primary | ICD-10-CM

## 2023-01-12 PROCEDURE — 1123F ACP DISCUSS/DSCN MKR DOCD: CPT | Performed by: NURSE PRACTITIONER

## 2023-01-12 PROCEDURE — 3078F DIAST BP <80 MM HG: CPT | Performed by: NURSE PRACTITIONER

## 2023-01-12 PROCEDURE — 99213 OFFICE O/P EST LOW 20 MIN: CPT | Performed by: NURSE PRACTITIONER

## 2023-01-12 PROCEDURE — 3074F SYST BP LT 130 MM HG: CPT | Performed by: NURSE PRACTITIONER

## 2023-01-12 RX ORDER — BENZONATATE 200 MG/1
200 CAPSULE ORAL
Qty: 21 CAPSULE | Refills: 0 | Status: SHIPPED | OUTPATIENT
Start: 2023-01-12 | End: 2023-01-19

## 2023-01-12 RX ORDER — DOXYCYCLINE 100 MG/1
100 TABLET ORAL 2 TIMES DAILY
Qty: 14 TABLET | Refills: 0 | Status: SHIPPED | OUTPATIENT
Start: 2023-01-12 | End: 2023-01-19

## 2023-01-12 NOTE — PROGRESS NOTES
Subjective: (As above and below)     The patient/guardian gave verbal consent to treat. Chief Complaint   Patient presents with    Chest Congestion     Sore throat and cough, started last Friday     Ariana Todd is a 74 y.o. female who presents for evaluation of : cough. Symptom onset 1 week ago . Preceding illness: none. No other identified aggravating or alleviating factors. Symptoms are constant and overall unchanged. +low grade fevers in evening. Denies severe SOB, vomiting, chest pain. ROS  Review of Systems - negative except as listed above    Reviewed PmHx, RxHx, FmHx, SocHx, AllgHx and updated in chart. Family History   Problem Relation Age of Onset    Heart Disease Mother     Heart Disease Father        Past Medical History:   Diagnosis Date    CAD (coronary artery disease)     Hypertension       Social History     Socioeconomic History    Marital status:    Tobacco Use    Smoking status: Never    Smokeless tobacco: Never   Vaping Use    Vaping Use: Never used   Substance and Sexual Activity    Alcohol use: Yes     Comment: 5-6 beers a week    Drug use: Yes     Types: Prescription    Sexual activity: Never   Other Topics Concern     Service No    Blood Transfusions No    Caffeine Concern No    Occupational Exposure No    Hobby Hazards No    Sleep Concern Yes    Stress Concern No    Weight Concern Yes    Special Diet Yes    Back Care No    Exercise Yes     Comment: walk dogs 3 miles/day    Bike Helmet Yes    Seat Belt Yes    Self-Exams No          Current Outpatient Medications   Medication Sig    doxycycline (ADOXA) 100 mg tablet Take 1 Tablet by mouth two (2) times a day for 7 days. benzonatate (TESSALON) 200 mg capsule Take 1 Capsule by mouth three (3) times daily as needed for Cough for up to 7 days.     isosorbide mononitrate ER (IMDUR) 30 mg tablet TAKE 1 TABLET BY MOUTH EVERY DAY TO PREVENT CHEST PAIN, CALL MD IF SIGNIFICANT HEADACHE    amLODIPine (NORVASC) 10 mg tablet TAKE 1 TABLET BY MOUTH EVERY DAY    rosuvastatin (CRESTOR) 40 mg tablet Take 1 Tablet by mouth nightly. Brilinta 60 mg tab tablet TAKE 1 TABLET BY MOUTH TWO TIMES A DAY. metoprolol succinate (TOPROL-XL) 50 mg XL tablet Take 1 Tablet by mouth daily. aspirin 81 mg chewable tablet Take 1 Tab by mouth daily. potassium chloride SR (KLOR-CON 10) 10 mEq tablet Take 1 Tablet by mouth daily. (Patient not taking: Reported on 1/12/2023)    nitroglycerin (NITROSTAT) 0.4 mg SL tablet 1 Tab by SubLINGual route every five (5) minutes as needed for Chest Pain. Up to 3 doses. (Patient not taking: Reported on 1/12/2023)     No current facility-administered medications for this visit. Objective:     Vitals:    01/12/23 1234   BP: 126/72   Pulse: 86   Resp: 17   Temp: 98.1 °F (36.7 °C)   SpO2: 99%   Weight: 144 lb (65.3 kg)       Physical Exam  General appearance - appears well hydrated and does not appear toxic, no acute distress  Eyes - EOMs intact. Non injected. No scleral icterus   Ears - no external swelling. TMs normal bilat. Nose - passages patent. No purulent drainage  Mouth - OP clear without swelling, exudate or lesion. Mucus membranes moist. Uvula midline. Neck/Lymphatics - trachea midline, full AROM, no LAD of neck  Chest - Normal breathing effort no wheeze rales, rhonchi or diminishments bilaterally. Heart - RRR, no murmurs  Skin - no observable rashes or pallor  Neurologic- alert and oriented x 3  Psychiatric- normal mood, behavior and though content. Assessment/ Plan:     1. Acute bacterial bronchitis    - doxycycline (ADOXA) 100 mg tablet; Take 1 Tablet by mouth two (2) times a day for 7 days. Dispense: 14 Tablet; Refill: 0  - benzonatate (TESSALON) 200 mg capsule; Take 1 Capsule by mouth three (3) times daily as needed for Cough for up to 7 days. Dispense: 21 Capsule; Refill: 0      Lungs clear on exam. VS stable. Will hold off on CXR.   Start doxycycline and tessalon for bronchitis  Maintain adequate fluid intake. Follow up: Follow up immediately for any new, worsening or changes or if symptoms are not improving over the next 5-7 days.          Jimbo Jennings NP

## 2023-02-15 RX ORDER — TICAGRELOR 60 MG/1
TABLET ORAL
Qty: 60 TABLET | Refills: 4 | Status: SHIPPED | OUTPATIENT
Start: 2023-02-15

## 2023-02-15 NOTE — TELEPHONE ENCOUNTER
Refill Request Received for the Following Medication     Requested Prescriptions     Pending Prescriptions Disp Refills    Brilinta 60 mg tab tablet [Pharmacy Med Name: BRILINTA 60 MG TABLET] 180 Tablet 1     Sig: TAKE 1 TABLET BY MOUTH TWICE A DAY       Last Prescribed:08-    Last Appointment With Me:  06-    Future Appointments:  Future Appointments   Date Time Provider George Koenig   6/19/2023  8:20 AM MD MIREILLE Maravilla AMB      On 10- b 13.5 plat [address]

## 2023-04-11 ENCOUNTER — APPOINTMENT (OUTPATIENT)
Dept: GENERAL RADIOLOGY | Age: 68
DRG: 287 | End: 2023-04-11
Attending: STUDENT IN AN ORGANIZED HEALTH CARE EDUCATION/TRAINING PROGRAM
Payer: COMMERCIAL

## 2023-04-11 ENCOUNTER — HOSPITAL ENCOUNTER (INPATIENT)
Age: 68
LOS: 2 days | Discharge: HOME | DRG: 287 | End: 2023-04-13
Attending: EMERGENCY MEDICINE | Admitting: STUDENT IN AN ORGANIZED HEALTH CARE EDUCATION/TRAINING PROGRAM
Payer: COMMERCIAL

## 2023-04-11 DIAGNOSIS — I25.10 CORONARY ARTERY DISEASE, UNSPECIFIED VESSEL OR LESION TYPE, UNSPECIFIED WHETHER ANGINA PRESENT, UNSPECIFIED WHETHER NATIVE OR TRANSPLANTED HEART: ICD-10-CM

## 2023-04-11 DIAGNOSIS — R07.9 CHEST PAIN, UNSPECIFIED TYPE: Primary | ICD-10-CM

## 2023-04-11 DIAGNOSIS — Z86.79 HISTORY OF CAD (CORONARY ARTERY DISEASE): ICD-10-CM

## 2023-04-11 LAB
ALBUMIN SERPL-MCNC: 4.2 G/DL (ref 3.5–5)
ALBUMIN/GLOB SERPL: 1.1 (ref 1.1–2.2)
ALP SERPL-CCNC: 105 U/L (ref 45–117)
ALT SERPL-CCNC: 45 U/L (ref 12–78)
ANION GAP SERPL CALC-SCNC: 3 MMOL/L (ref 5–15)
APTT PPP: 25.4 SEC (ref 22.1–31)
APTT PPP: 26.3 SEC (ref 22.1–31)
AST SERPL-CCNC: 22 U/L (ref 15–37)
BASOPHILS # BLD: 0.1 K/UL (ref 0–0.1)
BASOPHILS # BLD: 0.1 K/UL (ref 0–0.1)
BASOPHILS NFR BLD: 1 % (ref 0–1)
BASOPHILS NFR BLD: 1 % (ref 0–1)
BILIRUB SERPL-MCNC: 0.8 MG/DL (ref 0.2–1)
BNP SERPL-MCNC: 67 PG/ML
BUN SERPL-MCNC: 20 MG/DL (ref 6–20)
BUN/CREAT SERPL: 31 (ref 12–20)
CALCIUM SERPL-MCNC: 9.3 MG/DL (ref 8.5–10.1)
CHLORIDE SERPL-SCNC: 108 MMOL/L (ref 97–108)
CO2 SERPL-SCNC: 26 MMOL/L (ref 21–32)
COMMENT, HOLDF: NORMAL
COMMENT, HOLDF: NORMAL
CREAT SERPL-MCNC: 0.64 MG/DL (ref 0.55–1.02)
DIFFERENTIAL METHOD BLD: NORMAL
DIFFERENTIAL METHOD BLD: NORMAL
EOSINOPHIL # BLD: 0 K/UL (ref 0–0.4)
EOSINOPHIL # BLD: 0.1 K/UL (ref 0–0.4)
EOSINOPHIL NFR BLD: 0 % (ref 0–7)
EOSINOPHIL NFR BLD: 2 % (ref 0–7)
ERYTHROCYTE [DISTWIDTH] IN BLOOD BY AUTOMATED COUNT: 13.3 % (ref 11.5–14.5)
ERYTHROCYTE [DISTWIDTH] IN BLOOD BY AUTOMATED COUNT: 13.5 % (ref 11.5–14.5)
GLOBULIN SER CALC-MCNC: 3.9 G/DL (ref 2–4)
GLUCOSE SERPL-MCNC: 117 MG/DL (ref 65–100)
HCT VFR BLD AUTO: 35.3 % (ref 35–47)
HCT VFR BLD AUTO: 38.2 % (ref 35–47)
HGB BLD-MCNC: 12 G/DL (ref 11.5–16)
HGB BLD-MCNC: 13.2 G/DL (ref 11.5–16)
IMM GRANULOCYTES # BLD AUTO: 0 K/UL (ref 0–0.04)
IMM GRANULOCYTES # BLD AUTO: 0 K/UL (ref 0–0.04)
IMM GRANULOCYTES NFR BLD AUTO: 0 % (ref 0–0.5)
IMM GRANULOCYTES NFR BLD AUTO: 0 % (ref 0–0.5)
LYMPHOCYTES # BLD: 2.2 K/UL (ref 0.8–3.5)
LYMPHOCYTES # BLD: 2.3 K/UL (ref 0.8–3.5)
LYMPHOCYTES NFR BLD: 31 % (ref 12–49)
LYMPHOCYTES NFR BLD: 35 % (ref 12–49)
MCH RBC QN AUTO: 30.1 PG (ref 26–34)
MCH RBC QN AUTO: 30.7 PG (ref 26–34)
MCHC RBC AUTO-ENTMCNC: 34 G/DL (ref 30–36.5)
MCHC RBC AUTO-ENTMCNC: 34.6 G/DL (ref 30–36.5)
MCV RBC AUTO: 88.5 FL (ref 80–99)
MCV RBC AUTO: 88.8 FL (ref 80–99)
MONOCYTES # BLD: 0.5 K/UL (ref 0–1)
MONOCYTES # BLD: 0.7 K/UL (ref 0–1)
MONOCYTES NFR BLD: 9 % (ref 5–13)
MONOCYTES NFR BLD: 9 % (ref 5–13)
NEUTS SEG # BLD: 3.3 K/UL (ref 1.8–8)
NEUTS SEG # BLD: 4.3 K/UL (ref 1.8–8)
NEUTS SEG NFR BLD: 55 % (ref 32–75)
NEUTS SEG NFR BLD: 57 % (ref 32–75)
NRBC # BLD: 0 K/UL (ref 0–0.01)
NRBC # BLD: 0 K/UL (ref 0–0.01)
NRBC BLD-RTO: 0 PER 100 WBC
NRBC BLD-RTO: 0 PER 100 WBC
PLATELET # BLD AUTO: 235 K/UL (ref 150–400)
PLATELET # BLD AUTO: 263 K/UL (ref 150–400)
PMV BLD AUTO: 10 FL (ref 8.9–12.9)
PMV BLD AUTO: 10 FL (ref 8.9–12.9)
POTASSIUM SERPL-SCNC: 3.7 MMOL/L (ref 3.5–5.1)
PROT SERPL-MCNC: 8.1 G/DL (ref 6.4–8.2)
RBC # BLD AUTO: 3.99 M/UL (ref 3.8–5.2)
RBC # BLD AUTO: 4.3 M/UL (ref 3.8–5.2)
SAMPLES BEING HELD,HOLD: NORMAL
SAMPLES BEING HELD,HOLD: NORMAL
SODIUM SERPL-SCNC: 137 MMOL/L (ref 136–145)
THERAPEUTIC RANGE,PTTT: NORMAL SECS (ref 58–77)
THERAPEUTIC RANGE,PTTT: NORMAL SECS (ref 58–77)
TROPONIN I SERPL HS-MCNC: 5 NG/L (ref 0–51)
TROPONIN I SERPL HS-MCNC: 5 NG/L (ref 0–51)
UFH PPP CHRO-ACNC: 0.37 IU/ML
WBC # BLD AUTO: 6.1 K/UL (ref 3.6–11)
WBC # BLD AUTO: 7.5 K/UL (ref 3.6–11)

## 2023-04-11 PROCEDURE — 74011000250 HC RX REV CODE- 250: Performed by: STUDENT IN AN ORGANIZED HEALTH CARE EDUCATION/TRAINING PROGRAM

## 2023-04-11 PROCEDURE — 93005 ELECTROCARDIOGRAM TRACING: CPT

## 2023-04-11 PROCEDURE — 83880 ASSAY OF NATRIURETIC PEPTIDE: CPT

## 2023-04-11 PROCEDURE — 74011250637 HC RX REV CODE- 250/637: Performed by: STUDENT IN AN ORGANIZED HEALTH CARE EDUCATION/TRAINING PROGRAM

## 2023-04-11 PROCEDURE — 4A023N7 MEASUREMENT OF CARDIAC SAMPLING AND PRESSURE, LEFT HEART, PERCUTANEOUS APPROACH: ICD-10-PCS | Performed by: STUDENT IN AN ORGANIZED HEALTH CARE EDUCATION/TRAINING PROGRAM

## 2023-04-11 PROCEDURE — 85730 THROMBOPLASTIN TIME PARTIAL: CPT

## 2023-04-11 PROCEDURE — 85520 HEPARIN ASSAY: CPT

## 2023-04-11 PROCEDURE — 99285 EMERGENCY DEPT VISIT HI MDM: CPT

## 2023-04-11 PROCEDURE — 74011250636 HC RX REV CODE- 250/636: Performed by: EMERGENCY MEDICINE

## 2023-04-11 PROCEDURE — 74011250637 HC RX REV CODE- 250/637: Performed by: EMERGENCY MEDICINE

## 2023-04-11 PROCEDURE — 84484 ASSAY OF TROPONIN QUANT: CPT

## 2023-04-11 PROCEDURE — 36415 COLL VENOUS BLD VENIPUNCTURE: CPT

## 2023-04-11 PROCEDURE — 65270000046 HC RM TELEMETRY

## 2023-04-11 PROCEDURE — 85025 COMPLETE CBC W/AUTO DIFF WBC: CPT

## 2023-04-11 PROCEDURE — 74011250636 HC RX REV CODE- 250/636: Performed by: STUDENT IN AN ORGANIZED HEALTH CARE EDUCATION/TRAINING PROGRAM

## 2023-04-11 PROCEDURE — 71046 X-RAY EXAM CHEST 2 VIEWS: CPT

## 2023-04-11 PROCEDURE — 80053 COMPREHEN METABOLIC PANEL: CPT

## 2023-04-11 PROCEDURE — 4A033BC MEASUREMENT OF ARTERIAL PRESSURE, CORONARY, PERCUTANEOUS APPROACH: ICD-10-PCS | Performed by: STUDENT IN AN ORGANIZED HEALTH CARE EDUCATION/TRAINING PROGRAM

## 2023-04-11 PROCEDURE — B2111ZZ FLUOROSCOPY OF MULTIPLE CORONARY ARTERIES USING LOW OSMOLAR CONTRAST: ICD-10-PCS | Performed by: STUDENT IN AN ORGANIZED HEALTH CARE EDUCATION/TRAINING PROGRAM

## 2023-04-11 PROCEDURE — B241ZZ3 ULTRASONOGRAPHY OF MULTIPLE CORONARY ARTERIES, INTRAVASCULAR: ICD-10-PCS | Performed by: STUDENT IN AN ORGANIZED HEALTH CARE EDUCATION/TRAINING PROGRAM

## 2023-04-11 RX ORDER — METOPROLOL SUCCINATE 50 MG/1
50 TABLET, EXTENDED RELEASE ORAL DAILY
Status: DISCONTINUED | OUTPATIENT
Start: 2023-04-12 | End: 2023-04-13 | Stop reason: HOSPADM

## 2023-04-11 RX ORDER — ACETAMINOPHEN 650 MG/1
650 SUPPOSITORY RECTAL
Status: DISCONTINUED | OUTPATIENT
Start: 2023-04-11 | End: 2023-04-13 | Stop reason: HOSPADM

## 2023-04-11 RX ORDER — SODIUM CHLORIDE 0.9 % (FLUSH) 0.9 %
5-40 SYRINGE (ML) INJECTION EVERY 8 HOURS
Status: DISCONTINUED | OUTPATIENT
Start: 2023-04-11 | End: 2023-04-13 | Stop reason: HOSPADM

## 2023-04-11 RX ORDER — HEPARIN SODIUM 1000 [USP'U]/ML
60 INJECTION, SOLUTION INTRAVENOUS; SUBCUTANEOUS AS NEEDED
Status: DISCONTINUED | OUTPATIENT
Start: 2023-04-12 | End: 2023-04-13

## 2023-04-11 RX ORDER — HEPARIN SODIUM 1000 [USP'U]/ML
30 INJECTION, SOLUTION INTRAVENOUS; SUBCUTANEOUS AS NEEDED
Status: DISCONTINUED | OUTPATIENT
Start: 2023-04-12 | End: 2023-04-13

## 2023-04-11 RX ORDER — SODIUM CHLORIDE 0.9 % (FLUSH) 0.9 %
5-40 SYRINGE (ML) INJECTION AS NEEDED
Status: DISCONTINUED | OUTPATIENT
Start: 2023-04-11 | End: 2023-04-13 | Stop reason: HOSPADM

## 2023-04-11 RX ORDER — POLYETHYLENE GLYCOL 3350 17 G/17G
17 POWDER, FOR SOLUTION ORAL DAILY PRN
Status: DISCONTINUED | OUTPATIENT
Start: 2023-04-11 | End: 2023-04-13 | Stop reason: HOSPADM

## 2023-04-11 RX ORDER — HEPARIN SODIUM 10000 [USP'U]/100ML
12-25 INJECTION, SOLUTION INTRAVENOUS
Status: DISCONTINUED | OUTPATIENT
Start: 2023-04-12 | End: 2023-04-13

## 2023-04-11 RX ORDER — GUAIFENESIN 100 MG/5ML
324 LIQUID (ML) ORAL
Status: COMPLETED | OUTPATIENT
Start: 2023-04-11 | End: 2023-04-11

## 2023-04-11 RX ORDER — AMLODIPINE BESYLATE 5 MG/1
10 TABLET ORAL DAILY
Status: DISCONTINUED | OUTPATIENT
Start: 2023-04-12 | End: 2023-04-11

## 2023-04-11 RX ORDER — METOCLOPRAMIDE HYDROCHLORIDE 5 MG/ML
10 INJECTION INTRAMUSCULAR; INTRAVENOUS
Status: COMPLETED | OUTPATIENT
Start: 2023-04-11 | End: 2023-04-11

## 2023-04-11 RX ORDER — NITROGLYCERIN 0.4 MG/1
0.4 TABLET SUBLINGUAL
Status: DISCONTINUED | OUTPATIENT
Start: 2023-04-11 | End: 2023-04-13 | Stop reason: HOSPADM

## 2023-04-11 RX ORDER — ACETAMINOPHEN 325 MG/1
650 TABLET ORAL
Status: DISCONTINUED | OUTPATIENT
Start: 2023-04-11 | End: 2023-04-13 | Stop reason: HOSPADM

## 2023-04-11 RX ORDER — ENOXAPARIN SODIUM 100 MG/ML
1 INJECTION SUBCUTANEOUS
Status: COMPLETED | OUTPATIENT
Start: 2023-04-11 | End: 2023-04-11

## 2023-04-11 RX ORDER — GUAIFENESIN 100 MG/5ML
81 LIQUID (ML) ORAL DAILY
Status: DISCONTINUED | OUTPATIENT
Start: 2023-04-12 | End: 2023-04-13 | Stop reason: HOSPADM

## 2023-04-11 RX ORDER — HYDRALAZINE HYDROCHLORIDE 20 MG/ML
20 INJECTION INTRAMUSCULAR; INTRAVENOUS
Status: DISCONTINUED | OUTPATIENT
Start: 2023-04-11 | End: 2023-04-13 | Stop reason: HOSPADM

## 2023-04-11 RX ORDER — ISOSORBIDE MONONITRATE 30 MG/1
30 TABLET, EXTENDED RELEASE ORAL DAILY
Status: DISCONTINUED | OUTPATIENT
Start: 2023-04-12 | End: 2023-04-13 | Stop reason: HOSPADM

## 2023-04-11 RX ORDER — ROSUVASTATIN CALCIUM 40 MG/1
40 TABLET, COATED ORAL
Status: DISCONTINUED | OUTPATIENT
Start: 2023-04-11 | End: 2023-04-13 | Stop reason: HOSPADM

## 2023-04-11 RX ORDER — ONDANSETRON 2 MG/ML
4 INJECTION INTRAMUSCULAR; INTRAVENOUS
Status: DISCONTINUED | OUTPATIENT
Start: 2023-04-11 | End: 2023-04-13 | Stop reason: HOSPADM

## 2023-04-11 RX ORDER — BUTALBITAL, ACETAMINOPHEN AND CAFFEINE 50; 325; 40 MG/1; MG/1; MG/1
1 TABLET ORAL
Status: COMPLETED | OUTPATIENT
Start: 2023-04-11 | End: 2023-04-11

## 2023-04-11 RX ORDER — ONDANSETRON 4 MG/1
4 TABLET, ORALLY DISINTEGRATING ORAL
Status: DISCONTINUED | OUTPATIENT
Start: 2023-04-11 | End: 2023-04-13 | Stop reason: HOSPADM

## 2023-04-11 RX ADMIN — BUTALBITAL, ACETAMINOPHEN, AND CAFFEINE 1 TABLET: 50; 325; 40 TABLET ORAL at 17:39

## 2023-04-11 RX ADMIN — METOCLOPRAMIDE 10 MG: 5 INJECTION, SOLUTION INTRAMUSCULAR; INTRAVENOUS at 17:39

## 2023-04-11 RX ADMIN — ASPIRIN 324 MG: 81 TABLET, CHEWABLE ORAL at 17:36

## 2023-04-11 RX ADMIN — SODIUM CHLORIDE, PRESERVATIVE FREE 10 ML: 5 INJECTION INTRAVENOUS at 21:49

## 2023-04-11 RX ADMIN — ROSUVASTATIN CALCIUM 40 MG: 40 TABLET, FILM COATED ORAL at 21:54

## 2023-04-11 RX ADMIN — ENOXAPARIN SODIUM 70 MG: 100 INJECTION SUBCUTANEOUS at 18:47

## 2023-04-11 RX ADMIN — TICAGRELOR 60 MG: 60 TABLET ORAL at 20:03

## 2023-04-11 NOTE — ED NOTES
Spoke with Dr. Jerry Escobar regarding consult. Verbal orders to make patient NPO after midnight. pt  refusing blood draw from [facility], pt pulled IV out and threw cling wrap, PA at bedside. No IV access, , PA ok with with no IV line.

## 2023-04-11 NOTE — ED TRIAGE NOTES
Pt arrives to ed w reports of sternal chest pain onset Sunday, took 2 nitro w relief. Contacted cardiology yesterday. Swelling present in legs/feet bilaterally onset 1 wk. Denies SOB. Hx 2 stents placed 2017.

## 2023-04-11 NOTE — H&P
Hospitalist Admission Note    NAME:  Judie Grimaldo   :  1955   MRN:  [id number]     Date/Time:  2023 5:43 PM    Patient PCP: Nabila Menezes MD  ______________________________________________________________________  Given the patient's current clinical presentation, I have a high level of concern for decompensation if discharged from the emergency department. Complex decision making was performed, which includes reviewing the patient's available past medical records, laboratory results, and x-ray films. Assessment / Plan:  Chest pain  Hx of Ischemic heart disease s/p stenting in 2021  HTN  HLD  Relieved with nitro, however has not completely resolved to pain level 0 which is atypical. Has history of ischemic heart disease requiring stents in 2021 with Dr. Kyler Govea. Follows with Dr. Natacha Mullins. Trop 5, ekg without ischemic changes. - repeat trop, ekg  - given symptoms similar to her presentation where she was stented, consulted Cardiology, VCS, appreciate assistance. - received , continue 81 daily  - continue home brillinta, metop 50 daily, crestor 40, imdur 30  - prn hydral    Bilateral leg swelling  Hx of venous insuficiency  - stopped amlodipine with leg swelling  - TTE ordered    Code Status: Full  Surrogate Decision MakerStelucas Dorman, , [id number]    DVT Prophylaxis: lovenox  GI Prophylaxis: not indicated  Disposition: pending      Subjective:   CHIEF COMPLAINT: chest pain    HISTORY OF PRESENT ILLNESS:     Judie Grimaldo is a 74 y.o.  female with ischemic heart disease s/p stenting in 2021, HTN, HLD, venous insufficiency, who presents with chest pain. Patient notes chest pain last , substernal, radiating to L arm. Occurring in the morning while taking a shower, had walked dog earlier that day. No associated SOB, nausea, vomiting, diaphoresis. Lasted for 30 min, decreased after she took 2 nitro.  Pain did not completely resolve, stated it has lingered on at a 1-2 throughout the week. States her symptoms are currently a 1. States it feels similar to how she felt when she had cardiac stents placed in March 2021. In the ED, trop of 5 and EKG without ischemic changes. Given her cardiac history requiring stents and symptoms described as similar to that presentation, we were asked to admit for work up and evaluation of the above problems. Past Medical History:   Diagnosis Date    CAD (coronary artery disease)     Hypertension         Past Surgical History:   Procedure Laterality Date    HX CORONARY STENT PLACEMENT  04/26/2017    HX GYN      vaginal wall repair    HX OTHER SURGICAL      surgery for fissure    TX UNLISTED PROCEDURE BREAST      cystectomy x2       Social History     Tobacco Use    Smoking status: Never    Smokeless tobacco: Never   Substance Use Topics    Alcohol use: Yes     Comment: 5-6 beers a week        Family History   Problem Relation Age of Onset    Heart Disease Mother     Heart Disease Father        Allergies   Allergen Reactions    Codeine Nausea and Vomiting        Prior to Admission medications    Medication Sig Start Date End Date Taking? Authorizing Provider   rosuvastatin (CRESTOR) 40 mg tablet TAKE 1 TABLET BY MOUTH NIGHTLY 3/10/23   Neelam Marshall MD   amLODIPine (NORVASC) 10 mg tablet TAKE 1 TABLET BY MOUTH EVERY DAY 3/10/23   Neelam Marshall MD   ticagrelor (Brilinta) 60 mg tab tablet TAKE 1 TABLET BY MOUTH TWICE A DAY 2/15/23   Neelam Marshall MD   isosorbide mononitrate ER (IMDUR) 30 mg tablet TAKE 1 TABLET BY MOUTH EVERY DAY TO PREVENT CHEST PAIN, CALL MD IF SIGNIFICANT HEADACHE 12/16/22   Neelam Marshall MD   potassium chloride SR (KLOR-CON 10) 10 mEq tablet Take 1 Tablet by mouth daily. Patient not taking: Reported on 1/12/2023 10/29/22   Miguel Newton MD   metoprolol succinate (TOPROL-XL) 50 mg XL tablet Take 1 Tablet by mouth daily.  5/9/22   Neelam Marshall MD   nitroglycerin (NITROSTAT) 0.4 mg SL tablet 1 Tab by SubLINGual route every five (5) minutes as needed for Chest Pain. Up to 3 doses. Patient not taking: Reported on 1/12/2023 3/23/21   Myra Marinelli MD   aspirin 81 mg chewable tablet Take 1 Tab by mouth daily. 4/27/17   Jabier Rankin MD       REVIEW OF SYSTEMS:     14 point ROS reviewed with patient and negative except per above. Objective:   VITALS:    Visit Vitals  BP (!) 172/79   Pulse 91   Temp 97.9 °F (36.6 °C)   Resp 16   Ht 5' 3\" (1.6 m)   Wt 66.7 kg (147 lb)   SpO2 98%   BMI 26.04 kg/m²       PHYSICAL EXAM:  General:   No acute distress, Answering questions appropriately  HEENT:  PERRL, EOMI, Anicteric sclera. MMM  Neck:   Supple  Resp:    CTAB, non-labored, No wheezes or crackles  Cardio:  regular rate, normal rhythm, no murmurs, nontender chest wall  GI:    Soft, Non-tender, Non-distended, Normal bowel sounds. Extremities:  Warm, well perfused, trace LE edema, pulses 2+ and symmetric. Skin:    Warm, Dry, Pink, Intact. Neurologic:   Alert and Oriented x4, No focal defects,   _____________________________________________________________________    Procedures: see electronic medical records for all procedures/Xrays and details which were not copied into this note but were reviewed prior to creation of Plan.     LAB DATA REVIEWED:    Recent Results (from the past 24 hour(s))   EKG, 12 LEAD, INITIAL    Collection Time: 04/11/23  2:26 PM   Result Value Ref Range    Ventricular Rate 86 BPM    Atrial Rate 86 BPM    P-R Interval 150 ms    QRS Duration 92 ms    Q-T Interval 374 ms    QTC Calculation (Bezet) 447 ms    Calculated P Axis 68 degrees    Calculated R Axis 54 degrees    Calculated T Axis 48 degrees    Diagnosis       Normal sinus rhythm  Nonspecific ST abnormality  When compared with ECG of 22-MAR-2021 10:24,  premature atrial complexes are no longer present     CBC WITH AUTOMATED DIFF    Collection Time: 04/11/23  2:35 PM   Result Value Ref Range    WBC 7.5 3.6 - 11.0 K/uL    RBC 4.30 3.80 - 5.20 M/uL    HGB 13.2 11.5 - 16.0 g/dL    HCT 38.2 35.0 - 47.0 %    MCV 88.8 80.0 - 99.0 FL    MCH 30.7 26.0 - 34.0 PG    MCHC 34.6 30.0 - 36.5 g/dL    RDW 13.5 11.5 - 14.5 %    PLATELET 424 707 - 163 K/uL    MPV 10.0 8.9 - 12.9 FL    NRBC 0.0 0  WBC    ABSOLUTE NRBC 0.00 0.00 - 0.01 K/uL    NEUTROPHILS 57 32 - 75 %    LYMPHOCYTES 31 12 - 49 %    MONOCYTES 9 5 - 13 %    EOSINOPHILS 2 0 - 7 %    BASOPHILS 1 0 - 1 %    IMMATURE GRANULOCYTES 0 0.0 - 0.5 %    ABS. NEUTROPHILS 4.3 1.8 - 8.0 K/UL    ABS. LYMPHOCYTES 2.3 0.8 - 3.5 K/UL    ABS. MONOCYTES 0.7 0.0 - 1.0 K/UL    ABS. EOSINOPHILS 0.1 0.0 - 0.4 K/UL    ABS. BASOPHILS 0.1 0.0 - 0.1 K/UL    ABS. IMM. GRANS. 0.0 0.00 - 0.04 K/UL    DF AUTOMATED     METABOLIC PANEL, COMPREHENSIVE    Collection Time: 04/11/23  2:35 PM   Result Value Ref Range    Sodium 137 136 - 145 mmol/L    Potassium 3.7 3.5 - 5.1 mmol/L    Chloride 108 97 - 108 mmol/L    CO2 26 21 - 32 mmol/L    Anion gap 3 (L) 5 - 15 mmol/L    Glucose 117 (H) 65 - 100 mg/dL    BUN 20 6 - 20 MG/DL    Creatinine 0.64 0.55 - 1.02 MG/DL    BUN/Creatinine ratio 31 (H) 12 - 20      eGFR >60 >60 ml/min/1.73m2    Calcium 9.3 8.5 - 10.1 MG/DL    Bilirubin, total 0.8 0.2 - 1.0 MG/DL    ALT (SGPT) 45 12 - 78 U/L    AST (SGOT) 22 15 - 37 U/L    Alk. phosphatase 105 45 - 117 U/L    Protein, total 8.1 6.4 - 8.2 g/dL    Albumin 4.2 3.5 - 5.0 g/dL    Globulin 3.9 2.0 - 4.0 g/dL    A-G Ratio 1.1 1.1 - 2.2     NT-PRO BNP    Collection Time: 04/11/23  2:35 PM   Result Value Ref Range    NT pro-BNP 67 <125 PG/ML   TROPONIN-HIGH SENSITIVITY    Collection Time: 04/11/23  2:35 PM   Result Value Ref Range    Troponin-High Sensitivity 5 0 - 51 ng/L       >50% of visit spent in counseling and coordination of care.     Signed: Hoang Bee MD

## 2023-04-12 ENCOUNTER — APPOINTMENT (OUTPATIENT)
Dept: NON INVASIVE DIAGNOSTICS | Age: 68
DRG: 287 | End: 2023-04-12
Attending: STUDENT IN AN ORGANIZED HEALTH CARE EDUCATION/TRAINING PROGRAM
Payer: COMMERCIAL

## 2023-04-12 LAB
ANION GAP SERPL CALC-SCNC: 5 MMOL/L (ref 5–15)
APTT PPP: 33.9 SEC (ref 22.1–31)
APTT PPP: 50.6 SEC (ref 22.1–31)
APTT PPP: 63.2 SEC (ref 22.1–31)
ATRIAL RATE: 83 BPM
ATRIAL RATE: 86 BPM
BASOPHILS # BLD: 0.1 K/UL (ref 0–0.1)
BASOPHILS NFR BLD: 1 % (ref 0–1)
BUN SERPL-MCNC: 16 MG/DL (ref 6–20)
BUN/CREAT SERPL: 24 (ref 12–20)
CALCIUM SERPL-MCNC: 8.9 MG/DL (ref 8.5–10.1)
CALCULATED P AXIS, ECG09: 59 DEGREES
CALCULATED P AXIS, ECG09: 68 DEGREES
CALCULATED R AXIS, ECG10: 12 DEGREES
CALCULATED R AXIS, ECG10: 54 DEGREES
CALCULATED T AXIS, ECG11: 27 DEGREES
CALCULATED T AXIS, ECG11: 48 DEGREES
CHLORIDE SERPL-SCNC: 106 MMOL/L (ref 97–108)
CO2 SERPL-SCNC: 26 MMOL/L (ref 21–32)
CREAT SERPL-MCNC: 0.67 MG/DL (ref 0.55–1.02)
DIAGNOSIS, 93000: NORMAL
DIAGNOSIS, 93000: NORMAL
DIFFERENTIAL METHOD BLD: NORMAL
EOSINOPHIL # BLD: 0.3 K/UL (ref 0–0.4)
EOSINOPHIL NFR BLD: 4 % (ref 0–7)
ERYTHROCYTE [DISTWIDTH] IN BLOOD BY AUTOMATED COUNT: 13.4 % (ref 11.5–14.5)
GLUCOSE SERPL-MCNC: 142 MG/DL (ref 65–100)
HCT VFR BLD AUTO: 37.6 % (ref 35–47)
HGB BLD-MCNC: 12.8 G/DL (ref 11.5–16)
IMM GRANULOCYTES # BLD AUTO: 0 K/UL (ref 0–0.04)
IMM GRANULOCYTES NFR BLD AUTO: 0 % (ref 0–0.5)
LYMPHOCYTES # BLD: 2.8 K/UL (ref 0.8–3.5)
LYMPHOCYTES NFR BLD: 42 % (ref 12–49)
MAGNESIUM SERPL-MCNC: 1.9 MG/DL (ref 1.6–2.4)
MCH RBC QN AUTO: 30.3 PG (ref 26–34)
MCHC RBC AUTO-ENTMCNC: 34 G/DL (ref 30–36.5)
MCV RBC AUTO: 89.1 FL (ref 80–99)
MONOCYTES # BLD: 0.5 K/UL (ref 0–1)
MONOCYTES NFR BLD: 8 % (ref 5–13)
NEUTS SEG # BLD: 3 K/UL (ref 1.8–8)
NEUTS SEG NFR BLD: 45 % (ref 32–75)
NRBC # BLD: 0 K/UL (ref 0–0.01)
NRBC BLD-RTO: 0 PER 100 WBC
P-R INTERVAL, ECG05: 150 MS
P-R INTERVAL, ECG05: 152 MS
PHOSPHATE SERPL-MCNC: 4.3 MG/DL (ref 2.6–4.7)
PLATELET # BLD AUTO: 256 K/UL (ref 150–400)
PMV BLD AUTO: 9.5 FL (ref 8.9–12.9)
POTASSIUM SERPL-SCNC: 3.9 MMOL/L (ref 3.5–5.1)
Q-T INTERVAL, ECG07: 374 MS
Q-T INTERVAL, ECG07: 390 MS
QRS DURATION, ECG06: 88 MS
QRS DURATION, ECG06: 92 MS
QTC CALCULATION (BEZET), ECG08: 447 MS
QTC CALCULATION (BEZET), ECG08: 458 MS
RBC # BLD AUTO: 4.22 M/UL (ref 3.8–5.2)
SODIUM SERPL-SCNC: 137 MMOL/L (ref 136–145)
THERAPEUTIC RANGE,PTTT: ABNORMAL SECS (ref 58–77)
VENTRICULAR RATE, ECG03: 83 BPM
VENTRICULAR RATE, ECG03: 86 BPM
WBC # BLD AUTO: 6.6 K/UL (ref 3.6–11)

## 2023-04-12 PROCEDURE — 36415 COLL VENOUS BLD VENIPUNCTURE: CPT

## 2023-04-12 PROCEDURE — 85730 THROMBOPLASTIN TIME PARTIAL: CPT

## 2023-04-12 PROCEDURE — 83735 ASSAY OF MAGNESIUM: CPT

## 2023-04-12 PROCEDURE — 80048 BASIC METABOLIC PNL TOTAL CA: CPT

## 2023-04-12 PROCEDURE — 74011000250 HC RX REV CODE- 250: Performed by: STUDENT IN AN ORGANIZED HEALTH CARE EDUCATION/TRAINING PROGRAM

## 2023-04-12 PROCEDURE — 85025 COMPLETE CBC W/AUTO DIFF WBC: CPT

## 2023-04-12 PROCEDURE — 74011250637 HC RX REV CODE- 250/637: Performed by: STUDENT IN AN ORGANIZED HEALTH CARE EDUCATION/TRAINING PROGRAM

## 2023-04-12 PROCEDURE — 65270000046 HC RM TELEMETRY

## 2023-04-12 PROCEDURE — 84100 ASSAY OF PHOSPHORUS: CPT

## 2023-04-12 PROCEDURE — 74011250636 HC RX REV CODE- 250/636: Performed by: STUDENT IN AN ORGANIZED HEALTH CARE EDUCATION/TRAINING PROGRAM

## 2023-04-12 PROCEDURE — 93306 TTE W/DOPPLER COMPLETE: CPT

## 2023-04-12 RX ADMIN — SODIUM CHLORIDE, PRESERVATIVE FREE 10 ML: 5 INJECTION INTRAVENOUS at 17:28

## 2023-04-12 RX ADMIN — ASPIRIN 81 MG: 81 TABLET, CHEWABLE ORAL at 10:33

## 2023-04-12 RX ADMIN — TICAGRELOR 60 MG: 60 TABLET ORAL at 17:28

## 2023-04-12 RX ADMIN — HEPARIN SODIUM 12 UNITS/KG/HR: 10000 INJECTION, SOLUTION INTRAVENOUS at 00:06

## 2023-04-12 RX ADMIN — HEPARIN SODIUM 4000 UNITS: 1000 INJECTION INTRAVENOUS; SUBCUTANEOUS at 06:31

## 2023-04-12 RX ADMIN — SODIUM CHLORIDE, PRESERVATIVE FREE 10 ML: 5 INJECTION INTRAVENOUS at 05:20

## 2023-04-12 RX ADMIN — METOPROLOL SUCCINATE 50 MG: 50 TABLET, EXTENDED RELEASE ORAL at 10:33

## 2023-04-12 RX ADMIN — HEPARIN SODIUM 16 UNITS/KG/HR: 10000 INJECTION, SOLUTION INTRAVENOUS at 19:20

## 2023-04-12 RX ADMIN — ROSUVASTATIN CALCIUM 40 MG: 40 TABLET, FILM COATED ORAL at 21:07

## 2023-04-12 RX ADMIN — TICAGRELOR 60 MG: 60 TABLET ORAL at 10:37

## 2023-04-12 RX ADMIN — SODIUM CHLORIDE, PRESERVATIVE FREE 10 ML: 5 INJECTION INTRAVENOUS at 21:24

## 2023-04-12 RX ADMIN — ISOSORBIDE MONONITRATE 30 MG: 30 TABLET, EXTENDED RELEASE ORAL at 10:33

## 2023-04-12 NOTE — PROGRESS NOTES
9149  Change of shift report received. Patient admitted 4/11 for intermittent chest pain. Heparin drip started at 0030 last night. Current rate 16 units/kg/hour. 0479  Alert. Oriented x 4. Respirations not labored. No chest pain or shortness of breath at this time. Next PTT 1230. NPO for Cardiology today. 0750  IV left antecubital area removed. Not flushing. No redness or swelling at site. Cath tip removed intact. 2x2 to area. [address] consulted--Patient of Dr. Tim Sanchez but should be followed by [address]  Cardiology while here at [facility] ER. Patient remains NPO while pending cardiology consult and recommendations. 1125  Reassessment. No chest pain or shortness of breath. Remains NPO.    1336  Cardiac cath now to be done tomorrow 0730. Cardiology NP at bedside to update patient and family. Meal tray ordered. To be NPO after midnight. 1345  Repeat PTT drawn. 1459  PTT resulted 63.2 therapeutic range. Rate verified heparin drip. No change. Remains at 16 units/kg/hour. Next PTT to be drawn at [address] [place] [address]. No chest pain or shortness of breath. 1553  Reassessment. No chest pain or shortness of breath. 1608  ECHO tech at bedside. 1912  End of Shift Note    Bedside shift change report given to [employer] RN (oncoming nurse) by William Almanza RN (offgoing nurse). Report included the following information SBAR, Kardex, MAR, Recent Results, Cardiac Rhythm NSR, and Quality Measures    Shift worked:  [id number]     Shift summary and any significant changes:     Cardiac cath planned for tomorrow morning. ECHO done today. Remains on Heparin drip at 16 units/kg/hour. No chest pain or shortness of breath today. Concerns for physician to address:  none     Zone phone for oncoming shift:   [phone]       Activity:  Activity Level: Up ad alexys  Number times ambulated in hallways past shift: 0  Number of times OOB to chair past shift: 0    Cardiac:   Cardiac Monitoring:  Yes Cardiac Rhythm: Sinus Rhythm    Access:   Current line(s): PIV     Genitourinary:   Urinary status: voiding    Respiratory:   O2 Device: None (Room air)  Chronic home O2 use?: NO  Incentive spirometer at bedside: NO     GI:  Last Bowel Movement Date:  (PTA)  Current diet:  DIET NPO Sips of Water with Meds  ADULT DIET Regular; Low Fat/Low Chol/High Fiber/ALTON  Passing flatus: YES  Tolerating current diet: YES       Pain Management:   Patient states pain is manageable on current regimen: N/A    Skin:  Dewey Score: 23  Interventions: increase time out of bed    Patient Safety:  Fall Score:    Interventions: bed/chair alarm; patient to call before getting out of bed       Length of Stay:  Expected LOS: 1d 16h  Actual LOS: 1      Ramona Shaver RN

## 2023-04-12 NOTE — PROGRESS NOTES
Patient arrived via wheelchair from the ED to room 2160, no complaints of pain, SOB, see flow sheet     Admission assessment complete, see flow sheet     3785: Heparin rate change increased to 18units/kg, confirmed with Marcella Crocker PharmD    Uneventful night     Shift report given to RN

## 2023-04-12 NOTE — ED NOTES
eTRANSFER SBAR NOTE    IP UNIT CALLED NOTE IS READY: Yes Spoke to Trish    IF there are questions Call transferring nurse (your name) Nori/Urban at phone # [phone]    SITUATION/BACKGROUND:    Patient is being transferred to [address], Room# 2160    Patient's Chief Complaint on arrival to ED was Peripheral edema and chest pain and is admitted for chest pain of uncertain etiology. CODE STATUS: Full Code    VITAL SIGNS (MUST BE WITHIN 1 HOUR OF TRANSFER TO IP UNIT:    TEMP: 97.9  PULSE: 75  RESP: 18  BP: 129/87  PAIN SCORE: 0  MEWS: 1     ISOLATION/PRECAUTIONS: No   ISOLATION TYPE:     Called outstanding consults: Yes     Are there sign and held orders that need to be released? No   Are all STAT orders completed: Yes    STAT labs collected: Yes  REPEAT LACTIC ACID DUE? No  TIME DUE:   Critical Labs Results? No  What? PHARMACY NEEDS:  Are there any titrating drips? No   If so what? Are there STAT meds  that need to be given? Yes    The following personal items will be sent with the patient during transfer to the floor: All valuables:   ITEM:    ITEM:    ITEM:           ASSESSMENT:    CIWA Assessment: No  Last Score:     NEURO:   NIH SCORE:        SAHARA SCREENING:   Swallow Screening  Is the Patient Unable to Remain Alert for Testing?: No (04/11/23 2000)  Is the Patient on a Modified Diet (Thickened Liquids) Due to Pre-existing Dysphagia?: No (04/11/23 2000)  Is There Presence of Existing Enteral Tube Feeding via the Stomach or Nose?: No (04/11/23 2000)  Is There Presence of Head-of-Bed Restrictions (Less than 30 Degrees)?: No (04/11/23 2000)  Is There Presence of Tracheotomy Tube?: No (04/11/23 2000)  Is the Patient Ordered Nothing-by-Mouth Status?: No (04/11/23 2000)  3 oz Water Swallow Screen: Pass (04/11/23 2000)      NEURO ASSESSMENT:        If a Stroke Case . .. When are the next V/S, nuero, NIH due? Is patient impulsive? No   Is patient oriented? Yes   Do they follow commands? Yes  Is the patient ambulatory? Yes Device need up ad alexys    FALL RISK? No    Is the Vance 1 Assessment completed? Yes  INTERVENTIONS: Call bell in reach    INTEGUMENTARY:   IS THE PATIENT UNDRESSED? Yes  WOUNDS PRESENT? No  On admit to ED No   ARE THE WOUNDS DOCUMENTED? N/a    RESPIRATORY:   Is patient on Oxygen? No    OXYGEN: Oxygen Therapy  O2 Device: None (Room air) (23)  IS patient on VENT? No      CARDIAC:   Is cardiac monitoring ordered? Yes Last Rhythm: Sinus Rythym  Patient to transfer with tele box on? Yes  Is patient using a LIFE VEST? Yes    LINE ACCESS:   Peripheral IV 23 Posterior;Right Hand (Active)       Peripheral IV 23 Left Antecubital (Active)       Peripheral IV  Left Basilic (Active)   Site Assessment Clean, dry, & intact 23   Phlebitis Assessment 0 23   Infiltration Assessment 0 23   Dressing Status Clean, dry, & intact 23   Dressing Type Tape;Transparent 23   Hub Color/Line Status Pink;Flushed;Patent 23   Action Taken Blood drawn 23        /GI:   CONTINENT BOWEL/BLADDER? Yes  URINARY OUTPUT: voiding   Written Order for Olguin Cath? No   CHRONIC OR ACUTE? If CHRONIC, is it 1days old, was it changed prior to specimen collection? WAS UA WITH REFLEX SENT TO LAB? IF NO,  COLLECT AND SEND PRIOR TO TRANSPORT TO INPATIENT AREA    RESTRAINTS IN USE: No      IS DOCUMENTATION COMPLETE:   Is there a current Order? When does it ?      Additional details as Needed:

## 2023-04-12 NOTE — PROGRESS NOTES
Discussed with cath lab. No availability for cardiac cath today due to pending procedures already on the docket. Will plan for cardiac cath tomorrow AM.    Discussed with pt. Resume diet now. Keep VALERIE calero. Precious Galeas NP  4/12/2023  1:27 PM    [facility]  [address] [facility] [place] [address] [place] [address]  [phone]  [email or web address]. [facility]

## 2023-04-12 NOTE — PROGRESS NOTES
Transition of Care Plan: Home with family assistance    RUR: 4%  Disposition: Home with family assistance  If SNF or IPR: Date FOC offered:  Date FOC received:  Date authorization started with reference number:  Date authorization received and expires:  Accepting facility:  Follow up appointments: PCP and Specialist as advised. DME needed:  Transportation at Discharge: Spouse to transport  [address] or means to access home: Yes       IM Medicare Letter: N/A  Is patient a  and connected with the [address] [facility]? No              If yes, was [employer] transfer form completed and [facility] notified? Caregiver Contact: Darrell Maxwell, Spouse, [phone]  Discharge Caregiver contacted prior to discharge? Care Conference needed?:  No    Care Management Interventions  PCP Verified by CM: Yes  Mode of Transport at Discharge:  Other (see comment) (Spouse to transport)  Transition of Care Consult (CM Consult): Discharge Planning  Discharge Durable Medical Equipment: No  Physical Therapy Consult: No  Occupational Therapy Consult: No  Speech Therapy Consult: No  Support Systems: Spouse/Significant Other, Other Family Member(s)  Confirm Follow Up Transport: Family  Discharge Location  Patient Expects to be Discharged to[de-identified] Home with family assistance    Reason for Admission:  Chest pain                     RUR Score:   4%                  Plan for utilizing home health:  Not anticipated at this time        PCP: First and Last name:  Roger Green MD     Name of Practice:    Are you a current patient: Yes/No: Yes   Approximate date of last visit: Fall 2022   Can you participate in a virtual visit with your PCP: Yes                    Current Advanced Directive/Advance Care Plan: Full Code      Healthcare Decision Maker:   Click here to complete [address] including selection of the Healthcare Decision Maker Relationship (ie \"Primary\")             Primary Decision MakerEdonnie Commander - Spouse - [phone] Transition of Care Plan:  Home with family assistance. CM met with patient at bedside. Pt lives with spouse in 2 story home with master bedroom on first floor, 3 steps to enter home. Pt reports being independent with ADL's prior to admission without any DME. Anticipating going home without any needed services. Denies any discharge concerns at this time. CM to continue to follow for discharge planning.

## 2023-04-12 NOTE — CONSULTS
[facility]  [address]  [phone]  [email or web address]       CARDIOLOGY CONSULTATION       Date of  Admission: 4/11/2023  4:36 PM     Admission type:Emergency   Primary Care Physician:Laine King MD     Attending Provider: Shayan Huang MD  Cardiology Provider:     PLEASE NOTE THAT WE CONFIRMED WITH THE REFERRING PHYSICIAN PRIOR TO SEEING THE PATIENT THAT THE PATIENT IS BEING REFERRED FOR [address] EVALUATION AND FOR LONG-TERM ONGOING CARDIAC CARE    CC/REASON FOR CONSULT: Angina      Subjective:     Nerissa Garrido is a 74 y.o. female admitted for Chest pain of uncertain etiology [U23.3]. Patient is a 53-year-old female typically followed by [facility] Dr. Esha Villaseñor with a history of atherosclerotic heart disease, venous insufficiency and additional past medical history as reported below has been admitted for anginal type symptoms. She reports having progressive dyspnea on exertion and exertional related chest pain, recalls last week while on vacation in [facility] head she was experiencing chest pain and dyspnea with walking and going up stairs. Her symptoms have been waxing and waning. Patient seen in bed this morning sinus rhythm vital stable on heparin drip no overnight chest pain. Has been n.p.o.   Patient Active Problem List    Diagnosis Date Noted    Chest pain of uncertain etiology 57/73/6694    Chest pain 03/22/2021    Acute coronary syndrome ([address] 03/22/2021    S/P cardiac cath 03/18/2021    HTN (hypertension) 03/17/2021    HLD (hyperlipidemia) 03/17/2021    Venous insufficiency of both lower extremities 07/21/2020    Varicose veins of both legs with edema 07/21/2020    CAD (coronary artery disease) 07/21/2020    Unstable angina ([address] 04/26/2017      Shira Gibbs MD  Past Medical History:   Diagnosis Date    CAD (coronary artery disease)     Hypertension       Social History     Socioeconomic History    Marital status:    Tobacco Use    Smoking status: Never    Smokeless tobacco: Never   Vaping Use    Vaping Use: Never used   Substance and Sexual Activity    Alcohol use: Yes     Comment: 5-6 beers a week    Drug use: Yes     Types: Prescription    Sexual activity: Never   Other Topics Concern     Service No    Blood Transfusions No    Caffeine Concern No    Occupational Exposure No    Hobby Hazards No    Sleep Concern Yes    Stress Concern No    Weight Concern Yes    Special Diet Yes    Back Care No    Exercise Yes     Comment: walk dogs 3 miles/day    Bike Helmet Yes    Seat Belt Yes    Self-Exams No     Allergies   Allergen Reactions    Codeine Nausea and Vomiting      Family History   Problem Relation Age of Onset    Heart Disease Mother     Heart Disease Father       Current Facility-Administered Medications   Medication Dose Route Frequency    nitroglycerin (NITROSTAT) tablet 0.4 mg  0.4 mg SubLINGual Q5MIN PRN    aspirin chewable tablet 81 mg  81 mg Oral DAILY    isosorbide mononitrate ER (IMDUR) tablet 30 mg  30 mg Oral DAILY    metoprolol succinate (TOPROL-XL) XL tablet 50 mg  50 mg Oral DAILY    rosuvastatin (CRESTOR) tablet 40 mg  40 mg Oral QHS    ticagrelor (BRILINTA) tablet 60 mg  60 mg Oral BID    sodium chloride (NS) flush 5-40 mL  5-40 mL IntraVENous Q8H    sodium chloride (NS) flush 5-40 mL  5-40 mL IntraVENous PRN    acetaminophen (TYLENOL) tablet 650 mg  650 mg Oral Q6H PRN    Or    acetaminophen (TYLENOL) suppository 650 mg  650 mg Rectal Q6H PRN    polyethylene glycol (MIRALAX) packet 17 g  17 g Oral DAILY PRN    ondansetron (ZOFRAN ODT) tablet 4 mg  4 mg Oral Q8H PRN    Or    ondansetron (ZOFRAN) injection 4 mg  4 mg IntraVENous Q6H PRN    hydrALAZINE (APRESOLINE) 20 mg/mL injection 20 mg  20 mg IntraVENous Q6H PRN    heparin 25,000 units in D5W 250 ml infusion  12-25 Units/kg/hr IntraVENous TITRATE    heparin (porcine) 1,000 unit/mL injection 2,000 Units  30 Units/kg IntraVENous PRN    Or heparin (porcine) 1,000 unit/mL injection 4,000 Units  60 Units/kg IntraVENous PRN          REVIEW OF SYSTEMS  Review of Systems   Constitutional:  Negative for chills, diaphoresis, fever, malaise/fatigue and weight loss. Respiratory:  Positive for shortness of breath. Negative for cough, hemoptysis and sputum production. Cardiovascular:  Positive for chest pain. Negative for palpitations, orthopnea, claudication, leg swelling and PND. Gastrointestinal:  Negative for heartburn, nausea and vomiting. Objective:      Visit Vitals  /66 (BP 1 Location: Right upper arm, BP Patient Position: At rest)   Pulse 74   Temp 98.1 °F (36.7 °C)   Resp 16   Ht 5' 3\" (1.6 m)   Wt 67.6 kg (149 lb 1.6 oz)   SpO2 98%   BMI 26.41 kg/m²       Physical Exam:     Physical Exam  Constitutional:       Appearance: Normal appearance. She is not ill-appearing or diaphoretic. Cardiovascular:      Rate and Rhythm: Normal rate and regular rhythm. Pulses: Normal pulses. Heart sounds: Normal heart sounds. No murmur heard. No friction rub. No gallop. Pulmonary:      Effort: Pulmonary effort is normal. No respiratory distress. Breath sounds: Normal breath sounds. No wheezing or rales. Abdominal:      General: Abdomen is flat. Palpations: Abdomen is soft. Musculoskeletal:      Right lower leg: No edema. Left lower leg: No edema. Neurological:      Mental Status: She is alert and oriented to person, place, and time.    Psychiatric:         Mood and Affect: Mood normal.         Behavior: Behavior normal.        Data Review:   Recent Labs     04/12/23  0521 04/11/23  1949 04/11/23  1435   WBC 6.6 6.1 7.5   HGB 12.8 12.0 13.2   HCT 37.6 35.3 38.2    235 263     Recent Labs     04/12/23  0521 04/11/23  1435    137   K 3.9 3.7    108   CO2 26 26   * 117*   BUN 16 20   CREA 0.67 0.64   CA 8.9 9.3   MG 1.9  --    PHOS 4.3  --    ALB  --  4.2   TBILI  --  0.8   ALT  --  45     No results for input(s): CPK, CKMB, TROIQ in the last 72 hours. No lab exists for component: CKQMB, CPKMB, BMPP  No results for input(s): TROIQ, CPK, CKMB in the last 72 hours. Intake/Output Summary (Last 24 hours) at 4/12/2023 1128  Last data filed at 4/12/2023 0054  Gross per 24 hour   Intake 480 ml   Output --   Net 480 ml        Cardiographics    Telemetry: Normal sinus rhythm    ECG: Sinus rhythm no acute process    Echocardiogram: 2021 echo normal systolic function no valvular anomalies. CXRAY: Normal chest x-ray       Assessment:       Active Problems:    Chest pain of uncertain etiology (2/47/8730)         Plan:     1. Atherosclerotic heart disease: History of PTCA stenting of ostial LAD.  3/2021 catheterization demonstrated 30% in-stent restenosis with negative FFR at the time. She has maintained dual antiplatelet therapy. She is on dual antianginal therapy. We discussed the risk and benefits of left heart cath and is willing to proceed. 2.  Hyperlipidemia: Continue high intensity statin. 27-year-old female presenting with angina functional class III. Discussed risk and benefits of left heart cath, will perform this afternoon. Keep patient n.p.o., continue heparin drip    [facility]    Sean Handy MD    Patient seen and examined by me and nurse practitioner. I personally performed key components of the history, physical, and medical decision making and agree with the assessment and plan with minor modifications as noted. New shortness of breath with exertion noted recently by the patient along with sharp stabbing well localized area of chest pain similar to prior episodes that required stent to the ostium of left anterior descending coronary artery. I personally reviewed the cath films. Initial stent 2017 and then had second one for in-stent restenosis at the same location. 2021 ISR moderate severity with FFR negative. Takes medicines regularly.

## 2023-04-12 NOTE — ED PROVIDER NOTES
[facility] [id number] [facility]       Pt Name: Lavelle Oliver  MRN: [id number]  [place] 1955  Date of evaluation: 4/11/2023  Provider: Keshia Roper DO   PCP: Sherron Sandifer, MD  Note Started: 10:33 PM 4/11/23     CHIEF COMPLAINT       Chief Complaint   Patient presents with    Peripheral Edema    Chest Pain        HISTORY OF PRESENT ILLNESS: 1 or more elements      History From: Patient, History limited by: none     Lavelle Oliver is a 74 y.o. female past medical history significant for coronary artery disease, peripheral vascular disease presenting the emergency department complaining of chest pain. Chest pain is retrosternal, feels similar to prior episodes of coronary syndrome. Last cath was in 2021 which showed 30% stenosed LAD. Please See MDM for Additional Details of the HPI/PMH  Nursing Notes were all reviewed and agreed with or any disagreements were addressed in the HPI. REVIEW OF SYSTEMS        Positives and Pertinent negatives as per HPI. PAST HISTORY     Past Medical History:  Past Medical History:   Diagnosis Date    CAD (coronary artery disease)     Hypertension        Past Surgical History:  Past Surgical History:   Procedure Laterality Date    HX CORONARY STENT PLACEMENT  04/26/2017    HX GYN      vaginal wall repair    HX OTHER SURGICAL      surgery for fissure    MO UNLISTED PROCEDURE BREAST      cystectomy x2       Family History:  Family History   Problem Relation Age of Onset    Heart Disease Mother     Heart Disease Father        Social History:  Social History     Tobacco Use    Smoking status: Never    Smokeless tobacco: Never   Vaping Use    Vaping Use: Never used   Substance Use Topics    Alcohol use: Yes     Comment: 5-6 beers a week    Drug use: Yes     Types: Prescription       Allergies:   Allergies   Allergen Reactions    Codeine Nausea and Vomiting       CURRENT MEDICATIONS      Current Discharge Medication List        CONTINUE these medications which have NOT CHANGED    Details   rosuvastatin (CRESTOR) 40 mg tablet TAKE 1 TABLET BY MOUTH NIGHTLY  Qty: 90 Tablet, Refills: 0      amLODIPine (NORVASC) 10 mg tablet TAKE 1 TABLET BY MOUTH EVERY DAY  Qty: 90 Tablet, Refills: 0      ticagrelor (Brilinta) 60 mg tab tablet TAKE 1 TABLET BY MOUTH TWICE A DAY  Qty: 60 Tablet, Refills: 4      isosorbide mononitrate ER (IMDUR) 30 mg tablet TAKE 1 TABLET BY MOUTH EVERY DAY TO PREVENT CHEST PAIN, CALL MD IF SIGNIFICANT HEADACHE  Qty: 90 Tablet, Refills: 1      potassium chloride SR (KLOR-CON 10) 10 mEq tablet Take 1 Tablet by mouth daily. Qty: 4 Tablet, Refills: 0      metoprolol succinate (TOPROL-XL) 50 mg XL tablet Take 1 Tablet by mouth daily. Qty: 90 Tablet, Refills: 3      nitroglycerin (NITROSTAT) 0.4 mg SL tablet 1 Tab by SubLINGual route every five (5) minutes as needed for Chest Pain. Up to 3 doses. Qty: 30 Tab, Refills: 1      aspirin 81 mg chewable tablet Take 1 Tab by mouth daily. Qty: 30 Tab, Refills: 6             SCREENINGS               No data recorded         PHYSICAL EXAM      ED Triage Vitals   ED Encounter Vitals Group      BP 04/11/23 1422 (!) 172/79      Pulse (Heart Rate) 04/11/23 1422 91      Resp Rate 04/11/23 1422 16      Temp 04/11/23 1422 97.9 °F (36.6 °C)      Temp src --       O2 Sat (%) 04/11/23 1422 98 %      Weight 04/11/23 1421 147 lb      Height 04/11/23 1421 5' 3\"        Physical Exam  Vitals and nursing note reviewed. Constitutional:       Appearance: She is well-developed. HENT:      Head: Normocephalic and atraumatic. Eyes:      General:         Right eye: No discharge. Left eye: No discharge. Conjunctiva/sclera: Conjunctivae normal.      Pupils: Pupils are equal, round, and reactive to light. Neck:      Trachea: No tracheal deviation. Cardiovascular:      Rate and Rhythm: Normal rate and regular rhythm. Heart sounds: Normal heart sounds. No murmur heard.   Pulmonary:      Effort: Pulmonary effort is normal. No respiratory distress. Breath sounds: Normal breath sounds. No wheezing or rales. Abdominal:      General: Bowel sounds are normal.      Palpations: Abdomen is soft. Tenderness: There is no abdominal tenderness. There is no guarding or rebound. Musculoskeletal:         General: No tenderness or deformity. Normal range of motion. Cervical back: Normal range of motion and neck supple. Skin:     General: Skin is warm and dry. Findings: No erythema or rash. Neurological:      Mental Status: She is alert and oriented to person, place, and time. Psychiatric:         Behavior: Behavior normal.        DIAGNOSTIC RESULTS   LABS:     Recent Results (from the past 12 hour(s))   EKG, 12 LEAD, INITIAL    Collection Time: 04/11/23  2:26 PM   Result Value Ref Range    Ventricular Rate 86 BPM    Atrial Rate 86 BPM    P-R Interval 150 ms    QRS Duration 92 ms    Q-T Interval 374 ms    QTC Calculation (Bezet) 447 ms    Calculated P Axis 68 degrees    Calculated R Axis 54 degrees    Calculated T Axis 48 degrees    Diagnosis       Normal sinus rhythm  Nonspecific ST abnormality  When compared with ECG of 22-MAR-2021 10:24,  premature atrial complexes are no longer present     CBC WITH AUTOMATED DIFF    Collection Time: 04/11/23  2:35 PM   Result Value Ref Range    WBC 7.5 3.6 - 11.0 K/uL    RBC 4.30 3.80 - 5.20 M/uL    HGB 13.2 11.5 - 16.0 g/dL    HCT 38.2 35.0 - 47.0 %    MCV 88.8 80.0 - 99.0 FL    MCH 30.7 26.0 - 34.0 PG    MCHC 34.6 30.0 - 36.5 g/dL    RDW 13.5 11.5 - 14.5 %    PLATELET 191 443 - 748 K/uL    MPV 10.0 8.9 - 12.9 FL    NRBC 0.0 0  WBC    ABSOLUTE NRBC 0.00 0.00 - 0.01 K/uL    NEUTROPHILS 57 32 - 75 %    LYMPHOCYTES 31 12 - 49 %    MONOCYTES 9 5 - 13 %    EOSINOPHILS 2 0 - 7 %    BASOPHILS 1 0 - 1 %    IMMATURE GRANULOCYTES 0 0.0 - 0.5 %    ABS. NEUTROPHILS 4.3 1.8 - 8.0 K/UL    ABS. LYMPHOCYTES 2.3 0.8 - 3.5 K/UL    ABS.  MONOCYTES 0.7 0.0 - 1.0 K/UL ABS. EOSINOPHILS 0.1 0.0 - 0.4 K/UL    ABS. BASOPHILS 0.1 0.0 - 0.1 K/UL    ABS. IMM. GRANS. 0.0 0.00 - 0.04 K/UL    DF AUTOMATED     METABOLIC PANEL, COMPREHENSIVE    Collection Time: 04/11/23  2:35 PM   Result Value Ref Range    Sodium 137 136 - 145 mmol/L    Potassium 3.7 3.5 - 5.1 mmol/L    Chloride 108 97 - 108 mmol/L    CO2 26 21 - 32 mmol/L    Anion gap 3 (L) 5 - 15 mmol/L    Glucose 117 (H) 65 - 100 mg/dL    BUN 20 6 - 20 MG/DL    Creatinine 0.64 0.55 - 1.02 MG/DL    BUN/Creatinine ratio 31 (H) 12 - 20      eGFR >60 >60 ml/min/1.73m2    Calcium 9.3 8.5 - 10.1 MG/DL    Bilirubin, total 0.8 0.2 - 1.0 MG/DL    ALT (SGPT) 45 12 - 78 U/L    AST (SGOT) 22 15 - 37 U/L    Alk.  phosphatase 105 45 - 117 U/L    Protein, total 8.1 6.4 - 8.2 g/dL    Albumin 4.2 3.5 - 5.0 g/dL    Globulin 3.9 2.0 - 4.0 g/dL    A-G Ratio 1.1 1.1 - 2.2     NT-PRO BNP    Collection Time: 04/11/23  2:35 PM   Result Value Ref Range    NT pro-BNP 67 <125 PG/ML   TROPONIN-HIGH SENSITIVITY    Collection Time: 04/11/23  2:35 PM   Result Value Ref Range    Troponin-High Sensitivity 5 0 - 51 ng/L   TROPONIN-HIGH SENSITIVITY    Collection Time: 04/11/23  6:32 PM   Result Value Ref Range    Troponin-High Sensitivity 5 0 - 51 ng/L   EKG, 12 LEAD, SUBSEQUENT    Collection Time: 04/11/23  6:38 PM   Result Value Ref Range    Ventricular Rate 83 BPM    Atrial Rate 83 BPM    P-R Interval 152 ms    QRS Duration 88 ms    Q-T Interval 390 ms    QTC Calculation (Bezet) 458 ms    Calculated P Axis 59 degrees    Calculated R Axis 12 degrees    Calculated T Axis 27 degrees    Diagnosis       Normal sinus rhythm  Normal ECG  When compared with ECG of 11-APR-2023 14:26,  MANUAL COMPARISON REQUIRED, DATA IS UNCONFIRMED     PTT    Collection Time: 04/11/23  7:49 PM   Result Value Ref Range    aPTT 25.4 22.1 - 31.0 sec    aPTT, therapeutic range     58.0 - 77.0 SECS   ANTI-XA UNFRACTIONATED AND LMW HEPARIN    Collection Time: 04/11/23  7:49 PM   Result Value Ref Range Heparin Xa,Unfrac. and LMW 0.37 IU/mL   CBC WITH AUTOMATED DIFF    Collection Time: 04/11/23  7:49 PM   Result Value Ref Range    WBC 6.1 3.6 - 11.0 K/uL    RBC 3.99 3.80 - 5.20 M/uL    HGB 12.0 11.5 - 16.0 g/dL    HCT 35.3 35.0 - 47.0 %    MCV 88.5 80.0 - 99.0 FL    MCH 30.1 26.0 - 34.0 PG    MCHC 34.0 30.0 - 36.5 g/dL    RDW 13.3 11.5 - 14.5 %    PLATELET 278 795 - 740 K/uL    MPV 10.0 8.9 - 12.9 FL    NRBC 0.0 0  WBC    ABSOLUTE NRBC 0.00 0.00 - 0.01 K/uL    NEUTROPHILS 55 32 - 75 %    LYMPHOCYTES 35 12 - 49 %    MONOCYTES 9 5 - 13 %    EOSINOPHILS 0 0 - 7 %    BASOPHILS 1 0 - 1 %    IMMATURE GRANULOCYTES 0 0.0 - 0.5 %    ABS. NEUTROPHILS 3.3 1.8 - 8.0 K/UL    ABS. LYMPHOCYTES 2.2 0.8 - 3.5 K/UL    ABS. MONOCYTES 0.5 0.0 - 1.0 K/UL    ABS. EOSINOPHILS 0.0 0.0 - 0.4 K/UL    ABS. BASOPHILS 0.1 0.0 - 0.1 K/UL    ABS. IMM. GRANS. 0.0 0.00 - 0.04 K/UL    DF AUTOMATED     SAMPLES BEING HELD    Collection Time: 04/11/23  7:50 PM   Result Value Ref Range    SAMPLES BEING HELD DESIREE     COMMENT        Add-on orders for these samples will be processed based on acceptable specimen integrity and analyte stability, which may vary by analyte. SAMPLES BEING HELD    Collection Time: 04/11/23  7:50 PM   Result Value Ref Range    SAMPLES BEING HELD DESIREE     COMMENT        Add-on orders for these samples will be processed based on acceptable specimen integrity and analyte stability, which may vary by analyte. EKG: If performed, independent interpretation documented below in the MDM section     RADIOLOGY:  Non-plain film images such as CT, Ultrasound and MRI are read by the radiologist. Plain radiographic images are visualized and preliminarily interpreted by the ED Provider with the findings documented in the MDM section.      Interpretation per the Radiologist below, if available at the time of this note:     XR CHEST PA LAT    Result Date: 4/11/2023  Indication:  Chest Pain Exam: PA and lateral views of the chest. Direct comparison is made to prior CXR dated October 2022. Findings: Cardiomediastinal silhouette is within normal limits. Lungs are clear bilaterally. Pleural spaces are normal. Osseous structures are intact. No acute cardiopulmonary disease.          PROCEDURES   Unless otherwise noted below, none  Procedures     CRITICAL CARE TIME       EMERGENCY DEPARTMENT COURSE and DIFFERENTIAL DIAGNOSIS/MDM   Vitals:    Vitals:    04/11/23 2057 04/11/23 2130 04/11/23 2140 04/11/23 2232   BP: 129/87 115/80 (!) 148/59 98/71   Pulse: 75 80 78 91   Resp: 18 16 16 16   Temp: 97.9 °F (36.6 °C)  97.9 °F (36.6 °C)    SpO2: 96% 91% 100% 98%   Weight:       Height:            Patient was given the following medications:  Medications   nitroglycerin (NITROSTAT) tablet 0.4 mg (has no administration in time range)   aspirin chewable tablet 81 mg (has no administration in time range)   isosorbide mononitrate ER (IMDUR) tablet 30 mg (has no administration in time range)   metoprolol succinate (TOPROL-XL) XL tablet 50 mg (has no administration in time range)   rosuvastatin (CRESTOR) tablet 40 mg (40 mg Oral Given 4/11/23 2154)   ticagrelor (BRILINTA) tablet 60 mg (60 mg Oral Given 4/11/23 2003)   sodium chloride (NS) flush 5-40 mL (10 mL IntraVENous Given 4/11/23 2149)   sodium chloride (NS) flush 5-40 mL (has no administration in time range)   acetaminophen (TYLENOL) tablet 650 mg (has no administration in time range)     Or   acetaminophen (TYLENOL) suppository 650 mg (has no administration in time range)   polyethylene glycol (MIRALAX) packet 17 g (has no administration in time range)   ondansetron (ZOFRAN ODT) tablet 4 mg (has no administration in time range)     Or   ondansetron (ZOFRAN) injection 4 mg (has no administration in time range)   hydrALAZINE (APRESOLINE) 20 mg/mL injection 20 mg (has no administration in time range)   heparin 25,000 units in D5W 250 ml infusion (has no administration in time range)   heparin (porcine) 1,000 unit/mL injection 2,000 Units (has no administration in time range)     Or   heparin (porcine) 1,000 unit/mL injection 4,000 Units (has no administration in time range)   aspirin chewable tablet 324 mg (324 mg Oral Given 4/11/23 1736)   metoclopramide HCl (REGLAN) injection 10 mg (10 mg IntraVENous Given 4/11/23 1739)   butalbital-acetaminophen-caffeine (FIORICET, ESGIC) -40 mg per tablet 1 Tablet (1 Tablet Oral Given 4/11/23 1739)   enoxaparin (LOVENOX) injection 70 mg (70 mg SubCUTAneous Given 4/11/23 1847)       Medical Decision Making  70-year-old female here with chest pain, has a history of coronary artery disease, appears well and nontoxic on exam, but high concern for underlying coronary disease. EKG has some nonspecific changes, but not overtly ischemic. Chest x-ray is negative. Given her risk factors her heart score is greater than 6 and warrants hospitalization for further evocative testing, possible cath. Doubt PE. Doubt dissection    Amount and/or Complexity of Data Reviewed  External Data Reviewed: notes. Details: Prior cath results reviewed from 2021, had a 30% stenosed LAD  Labs: ordered. Radiology: ordered and independent interpretation performed. Details: 2 views of the chest show no acute intrathoracic process  ECG/medicine tests: ordered and independent interpretation performed. Details: EKG shows sinus rhythm rate 86. Normal axis. Nonspecific anterior lateral ST changes, no evidence of ST elevation myocardial infarction. Risk  OTC drugs. Prescription drug management. Decision regarding hospitalization. FINAL IMPRESSION     1. Chest pain, unspecified type    2. History of CAD (coronary artery disease)          DISPOSITION/PLAN   Robles Salgado  results have been reviewed with her. She has been counseled regarding her diagnosis, treatment, and plan.   She verbally conveys understanding and agreement of the signs, symptoms, diagnosis, treatment and prognosis and additionally agrees to follow up as discussed. She also agrees with the care-plan and conveys that all of her questions have been answered. I have also provided discharge instructions for her that include: educational information regarding their diagnosis and treatment, and list of reasons why they would want to return to the ED prior to their follow-up appointment, should her condition change. CLINICAL IMPRESSION    Admit Note: Pt is being admitted by Dr. Clarisse Montanez. The results of their tests and reason(s) for their admission have been discussed with pt and/or available family. They convey agreement and understanding for the need to be admitted and for the admission diagnosis. PATIENT REFERRED TO:  Follow-up Information    None           DISCHARGE MEDICATIONS:  Current Discharge Medication List            DISCONTINUED MEDICATIONS:  Current Discharge Medication List          I am the Primary Clinician of Record. Brittny Howard DO (electronically signed)    (Please note that parts of this dictation were completed with voice recognition software. Quite often unanticipated grammatical, syntax, homophones, and other interpretive errors are inadvertently transcribed by the computer software. Please disregards these errors.  Please excuse any errors that have escaped final proofreading.)

## 2023-04-12 NOTE — PROGRESS NOTES
Chest pain free. No shortness of breath. Patient to have cardiac cath later today. 1336  Cardiac cath now to be done tomorrow 0730. Cardiology NP at bedside to update patient and family. Meal tray ordered. To be NPO after midnight. 1455  Heparin drip to remain at set rate. Therapeutic range. Next PTT 1945. No chest pain or shortness of breath.

## 2023-04-12 NOTE — PROGRESS NOTES
Review of the chart shows that she follows with Dr. Wes Zambrano (not our group). I was consulted tonight, will inform nursing to call [facility] for the consult (Dr. Don Ramos). The patient will remain NPO after MN just in case.

## 2023-04-12 NOTE — PROGRESS NOTES
Hospitalist Progress Note    NAME: Yves Yeh   :  1955   MRN:  [id number]       Assessment / Plan:  Chest pain  Hx of Ischemic heart disease s/p stenting in  and . Had recurring chest pain in 2021 where they found 30% stenosis of LAD  HTN  HLD  - Currently chest pain free  - repeat trop, ekg  - given symptoms similar to her presentation where she was stented, consulted Cardiology, VCS, awaiting recs  - received , continue 81 daily  - continue home brillinta, metop 50 daily, crestor 40, imdur 30  - currently on heparin gtt     Bilateral leg swelling  Hx of venous insuficiency  - stopped amlodipine with leg swelling, patient reports being on this for multiple weeks years and does not believe that this is the cause of her lower extremity swelling  - ECHO pending         25.0 - 29.9 Overweight / Body mass index is 26.41 kg/m². Estimated discharge date:   Barriers: Cardiology recs    Code Status: Full  Surrogate Decision Maker: Kanu Arndt, , [id number]     DVT Prophylaxis: lovenox  GI Prophylaxis: not indicated  Disposition: home       Subjective:     Chief Complaint / Reason for Physician Visit  Patient seen and examined at the bedside. She currently denies any chest pain or shortness of breath. Discussed with RN events overnight. Review of Systems:  Symptom Y/N Comments  Symptom Y/N Comments   Fever/Chills    Chest Pain     Poor Appetite    Edema     Cough    Abdominal Pain     Sputum    Joint Pain     SOB/MONTEMAYOR    Pruritis/Rash     Nausea/vomit    Tolerating PT/OT     Diarrhea    Tolerating Diet     Constipation    Other       Could NOT obtain due to:      Objective:     VITALS:   Last 24hrs VS reviewed since prior progress note.  Most recent are:  Patient Vitals for the past 24 hrs:   Temp Pulse Resp BP SpO2   23 0733 98.1 °F (36.7 °C) 80 14 123/66 98 %   23 0513 98 °F (36.7 °C) 76 16 (!) 140/62 99 %   23 2232 97.7 °F (36.5 °C) 91 16 98/71 98 % 04/11/23 2140 97.9 °F (36.6 °C) 78 16 (!) 148/59 100 %   04/11/23 2130 -- 80 16 115/80 91 %   04/11/23 2057 97.9 °F (36.6 °C) 75 18 129/87 96 %   04/11/23 1922 97.9 °F (36.6 °C) 72 16 122/66 93 %   04/11/23 1422 97.9 °F (36.6 °C) 91 16 (!) 172/79 98 %       Intake/Output Summary (Last 24 hours) at 4/12/2023 0933  Last data filed at 4/12/2023 0054  Gross per 24 hour   Intake 480 ml   Output --   Net 480 ml        I had a face to face encounter and independently examined this patient on 4/12/2023, as outlined below:  PHYSICAL EXAM:  General: WD, WN. Alert, cooperative, no acute distress    EENT:  EOMI. Anicteric sclerae. MMM  Resp:  CTA bilaterally, no wheezing or rales. No accessory muscle use  CV:  Regular  rhythm,  No edema  GI:  Soft, Non distended, Non tender. +Bowel sounds  Neurologic:  Alert and oriented X 3, normal speech,   Psych:   Good insight. Not anxious nor agitated  Skin:  No rashes. No jaundice    Reviewed most current lab test results and cultures  YES  Reviewed most current radiology test results   YES  Review and summation of old records today    NO  Reviewed patient's current orders and MAR    YES  PMH/SH reviewed - no change compared to H&P  ________________________________________________________________________  Care Plan discussed with:    Comments   Patient     Family      RN     Care Manager     Consultant                        Multidiciplinary team rounds were held today with , nursing, pharmacist and clinical coordinator. Patient's plan of care was discussed; medications were reviewed and discharge planning was addressed.      ________________________________________________________________________  Total NON critical care TIME:  45   Minutes    Total CRITICAL CARE TIME Spent:   Minutes non procedure based      Comments   >50% of visit spent in counseling and coordination of care     ________________________________________________________________________  Clarita Sim MD     Procedures: see electronic medical records for all procedures/Xrays and details which were not copied into this note but were reviewed prior to creation of Plan. LABS:  I reviewed today's most current labs and imaging studies.   Pertinent labs include:  Recent Labs     04/12/23 0521 04/11/23 1949 04/11/23  1435   WBC 6.6 6.1 7.5   HGB 12.8 12.0 13.2   HCT 37.6 35.3 38.2    235 263     Recent Labs     04/12/23  0521 04/11/23  1435    137   K 3.9 3.7    108   CO2 26 26   * 117*   BUN 16 20   CREA 0.67 0.64   CA 8.9 9.3   MG 1.9  --    PHOS 4.3  --    ALB  --  4.2   TBILI  --  0.8   ALT  --  45       Signed: Cliff Tate MD

## 2023-04-13 VITALS
DIASTOLIC BLOOD PRESSURE: 63 MMHG | WEIGHT: 149 LBS | HEIGHT: 63 IN | OXYGEN SATURATION: 95 % | RESPIRATION RATE: 15 BRPM | TEMPERATURE: 98 F | BODY MASS INDEX: 26.4 KG/M2 | SYSTOLIC BLOOD PRESSURE: 108 MMHG | HEART RATE: 75 BPM

## 2023-04-13 LAB
ACT BLD: 293 SECS (ref 79–138)
ANION GAP SERPL CALC-SCNC: 5 MMOL/L (ref 5–15)
APTT PPP: 57 SEC (ref 22.1–31)
ATRIAL RATE: 81 BPM
BASOPHILS # BLD: 0 K/UL (ref 0–0.1)
BASOPHILS NFR BLD: 1 % (ref 0–1)
BUN SERPL-MCNC: 14 MG/DL (ref 6–20)
BUN/CREAT SERPL: 27 (ref 12–20)
CALCIUM SERPL-MCNC: 9.2 MG/DL (ref 8.5–10.1)
CALCULATED P AXIS, ECG09: 55 DEGREES
CALCULATED R AXIS, ECG10: 54 DEGREES
CALCULATED T AXIS, ECG11: 70 DEGREES
CHLORIDE SERPL-SCNC: 106 MMOL/L (ref 97–108)
CO2 SERPL-SCNC: 25 MMOL/L (ref 21–32)
CREAT SERPL-MCNC: 0.52 MG/DL (ref 0.55–1.02)
DIAGNOSIS, 93000: NORMAL
DIFFERENTIAL METHOD BLD: NORMAL
ECHO AO ASC DIAM: 3.2 CM
ECHO AO ASCENDING AORTA INDEX: 1.87 CM/M2
ECHO AO ROOT DIAM: 3.7 CM
ECHO AO ROOT INDEX: 2.16 CM/M2
ECHO AV AREA PEAK VELOCITY: 2.5 CM2
ECHO AV AREA/BSA PEAK VELOCITY: 1.5 CM2/M2
ECHO AV PEAK GRADIENT: 5 MMHG
ECHO AV PEAK VELOCITY: 1.1 M/S
ECHO AV VELOCITY RATIO: 0.73
ECHO LA DIAMETER INDEX: 2.05 CM/M2
ECHO LA DIAMETER: 3.5 CM
ECHO LA TO AORTIC ROOT RATIO: 0.95
ECHO LV E' LATERAL VELOCITY: 7 CM/S
ECHO LV E' SEPTAL VELOCITY: 6 CM/S
ECHO LV FRACTIONAL SHORTENING: 29 % (ref 28–44)
ECHO LV INTERNAL DIMENSION DIASTOLE INDEX: 2.4 CM/M2
ECHO LV INTERNAL DIMENSION DIASTOLIC: 4.1 CM (ref 3.9–5.3)
ECHO LV INTERNAL DIMENSION SYSTOLIC INDEX: 1.7 CM/M2
ECHO LV INTERNAL DIMENSION SYSTOLIC: 2.9 CM
ECHO LV IVSD: 1.2 CM (ref 0.6–0.9)
ECHO LV MASS 2D: 114.1 G (ref 67–162)
ECHO LV MASS INDEX 2D: 66.7 G/M2 (ref 43–95)
ECHO LV POSTERIOR WALL DIASTOLIC: 0.6 CM (ref 0.6–0.9)
ECHO LV RELATIVE WALL THICKNESS RATIO: 0.29
ECHO LVOT AREA: 3.5 CM2
ECHO LVOT DIAM: 2.1 CM
ECHO LVOT PEAK GRADIENT: 3 MMHG
ECHO LVOT PEAK VELOCITY: 0.8 M/S
ECHO MV A VELOCITY: 1.03 M/S
ECHO MV E DECELERATION TIME (DT): 120.4 MS
ECHO MV E VELOCITY: 0.62 M/S
ECHO MV E/A RATIO: 0.6
ECHO MV E/E' LATERAL: 8.86
ECHO MV E/E' RATIO (AVERAGED): 9.6
ECHO MV E/E' SEPTAL: 10.33
ECHO PV MAX VELOCITY: 1.3 M/S
ECHO PV PEAK GRADIENT: 6 MMHG
ECHO RV TAPSE: 1.8 CM (ref 1.7–?)
ECHO TV REGURGITANT MAX VELOCITY: 1.89 M/S
ECHO TV REGURGITANT PEAK GRADIENT: 14 MMHG
EOSINOPHIL # BLD: 0.1 K/UL (ref 0–0.4)
EOSINOPHIL NFR BLD: 2 % (ref 0–7)
ERYTHROCYTE [DISTWIDTH] IN BLOOD BY AUTOMATED COUNT: 13.7 % (ref 11.5–14.5)
GLUCOSE SERPL-MCNC: 126 MG/DL (ref 65–100)
HCT VFR BLD AUTO: 35.7 % (ref 35–47)
HGB BLD-MCNC: 12 G/DL (ref 11.5–16)
IMM GRANULOCYTES # BLD AUTO: 0 K/UL (ref 0–0.04)
IMM GRANULOCYTES NFR BLD AUTO: 0 % (ref 0–0.5)
LYMPHOCYTES # BLD: 2.1 K/UL (ref 0.8–3.5)
LYMPHOCYTES NFR BLD: 37 % (ref 12–49)
MAGNESIUM SERPL-MCNC: 2.1 MG/DL (ref 1.6–2.4)
MCH RBC QN AUTO: 29.8 PG (ref 26–34)
MCHC RBC AUTO-ENTMCNC: 33.6 G/DL (ref 30–36.5)
MCV RBC AUTO: 88.6 FL (ref 80–99)
MONOCYTES # BLD: 0.5 K/UL (ref 0–1)
MONOCYTES NFR BLD: 9 % (ref 5–13)
NEUTS SEG # BLD: 2.9 K/UL (ref 1.8–8)
NEUTS SEG NFR BLD: 51 % (ref 32–75)
NRBC # BLD: 0 K/UL (ref 0–0.01)
NRBC BLD-RTO: 0 PER 100 WBC
P-R INTERVAL, ECG05: 156 MS
PHOSPHATE SERPL-MCNC: 4 MG/DL (ref 2.6–4.7)
PLATELET # BLD AUTO: 230 K/UL (ref 150–400)
PMV BLD AUTO: 9.6 FL (ref 8.9–12.9)
POTASSIUM SERPL-SCNC: 3.6 MMOL/L (ref 3.5–5.1)
Q-T INTERVAL, ECG07: 376 MS
QRS DURATION, ECG06: 90 MS
QTC CALCULATION (BEZET), ECG08: 436 MS
RBC # BLD AUTO: 4.03 M/UL (ref 3.8–5.2)
SODIUM SERPL-SCNC: 136 MMOL/L (ref 136–145)
THERAPEUTIC RANGE,PTTT: ABNORMAL SECS (ref 58–77)
VENTRICULAR RATE, ECG03: 81 BPM
WBC # BLD AUTO: 5.7 K/UL (ref 3.6–11)

## 2023-04-13 PROCEDURE — 93571 IV DOP VEL&/PRESS C FLO 1ST: CPT | Performed by: STUDENT IN AN ORGANIZED HEALTH CARE EDUCATION/TRAINING PROGRAM

## 2023-04-13 PROCEDURE — 93458 L HRT ARTERY/VENTRICLE ANGIO: CPT | Performed by: STUDENT IN AN ORGANIZED HEALTH CARE EDUCATION/TRAINING PROGRAM

## 2023-04-13 PROCEDURE — C1769 GUIDE WIRE: HCPCS | Performed by: STUDENT IN AN ORGANIZED HEALTH CARE EDUCATION/TRAINING PROGRAM

## 2023-04-13 PROCEDURE — 99153 MOD SED SAME PHYS/QHP EA: CPT | Performed by: STUDENT IN AN ORGANIZED HEALTH CARE EDUCATION/TRAINING PROGRAM

## 2023-04-13 PROCEDURE — 77030030195 HC CATH ANGI DX PRF4 MRTM -A: Performed by: STUDENT IN AN ORGANIZED HEALTH CARE EDUCATION/TRAINING PROGRAM

## 2023-04-13 PROCEDURE — 74011250636 HC RX REV CODE- 250/636: Performed by: STUDENT IN AN ORGANIZED HEALTH CARE EDUCATION/TRAINING PROGRAM

## 2023-04-13 PROCEDURE — 93005 ELECTROCARDIOGRAM TRACING: CPT

## 2023-04-13 PROCEDURE — 74011000250 HC RX REV CODE- 250: Performed by: STUDENT IN AN ORGANIZED HEALTH CARE EDUCATION/TRAINING PROGRAM

## 2023-04-13 PROCEDURE — 2709999900 HC NON-CHARGEABLE SUPPLY: Performed by: STUDENT IN AN ORGANIZED HEALTH CARE EDUCATION/TRAINING PROGRAM

## 2023-04-13 PROCEDURE — 74011250637 HC RX REV CODE- 250/637: Performed by: STUDENT IN AN ORGANIZED HEALTH CARE EDUCATION/TRAINING PROGRAM

## 2023-04-13 PROCEDURE — C1887 CATHETER, GUIDING: HCPCS | Performed by: STUDENT IN AN ORGANIZED HEALTH CARE EDUCATION/TRAINING PROGRAM

## 2023-04-13 PROCEDURE — 80048 BASIC METABOLIC PNL TOTAL CA: CPT

## 2023-04-13 PROCEDURE — 85347 COAGULATION TIME ACTIVATED: CPT

## 2023-04-13 PROCEDURE — 77030010221 HC SPLNT WR POS TELE -B: Performed by: STUDENT IN AN ORGANIZED HEALTH CARE EDUCATION/TRAINING PROGRAM

## 2023-04-13 PROCEDURE — 77030019569 HC BND COMPR RAD TERU -B: Performed by: STUDENT IN AN ORGANIZED HEALTH CARE EDUCATION/TRAINING PROGRAM

## 2023-04-13 PROCEDURE — 85730 THROMBOPLASTIN TIME PARTIAL: CPT

## 2023-04-13 PROCEDURE — 77030015766: Performed by: STUDENT IN AN ORGANIZED HEALTH CARE EDUCATION/TRAINING PROGRAM

## 2023-04-13 PROCEDURE — 77030008543 HC TBNG MON PRSS MRTM -A: Performed by: STUDENT IN AN ORGANIZED HEALTH CARE EDUCATION/TRAINING PROGRAM

## 2023-04-13 PROCEDURE — 83735 ASSAY OF MAGNESIUM: CPT

## 2023-04-13 PROCEDURE — 77030019697 HC SYR ANGI INFL MRTM -B: Performed by: STUDENT IN AN ORGANIZED HEALTH CARE EDUCATION/TRAINING PROGRAM

## 2023-04-13 PROCEDURE — 99152 MOD SED SAME PHYS/QHP 5/>YRS: CPT | Performed by: STUDENT IN AN ORGANIZED HEALTH CARE EDUCATION/TRAINING PROGRAM

## 2023-04-13 PROCEDURE — 76937 US GUIDE VASCULAR ACCESS: CPT | Performed by: STUDENT IN AN ORGANIZED HEALTH CARE EDUCATION/TRAINING PROGRAM

## 2023-04-13 PROCEDURE — C1894 INTRO/SHEATH, NON-LASER: HCPCS | Performed by: STUDENT IN AN ORGANIZED HEALTH CARE EDUCATION/TRAINING PROGRAM

## 2023-04-13 PROCEDURE — 84100 ASSAY OF PHOSPHORUS: CPT

## 2023-04-13 PROCEDURE — 74011000636 HC RX REV CODE- 636: Performed by: STUDENT IN AN ORGANIZED HEALTH CARE EDUCATION/TRAINING PROGRAM

## 2023-04-13 PROCEDURE — 85025 COMPLETE CBC W/AUTO DIFF WBC: CPT

## 2023-04-13 PROCEDURE — 36415 COLL VENOUS BLD VENIPUNCTURE: CPT

## 2023-04-13 RX ORDER — ZOLPIDEM TARTRATE 5 MG/1
5 TABLET ORAL
Status: DISCONTINUED | OUTPATIENT
Start: 2023-04-13 | End: 2023-04-13 | Stop reason: HOSPADM

## 2023-04-13 RX ORDER — SODIUM CHLORIDE 0.9 % (FLUSH) 0.9 %
5-40 SYRINGE (ML) INJECTION AS NEEDED
Status: DISCONTINUED | OUTPATIENT
Start: 2023-04-13 | End: 2023-04-13 | Stop reason: HOSPADM

## 2023-04-13 RX ORDER — HEPARIN SODIUM 200 [USP'U]/100ML
INJECTION, SOLUTION INTRAVENOUS
Status: COMPLETED | OUTPATIENT
Start: 2023-04-13 | End: 2023-04-13

## 2023-04-13 RX ORDER — MIDAZOLAM HYDROCHLORIDE 1 MG/ML
INJECTION, SOLUTION INTRAMUSCULAR; INTRAVENOUS AS NEEDED
Status: DISCONTINUED | OUTPATIENT
Start: 2023-04-13 | End: 2023-04-13 | Stop reason: HOSPADM

## 2023-04-13 RX ORDER — VERAPAMIL HYDROCHLORIDE 2.5 MG/ML
INJECTION, SOLUTION INTRAVENOUS AS NEEDED
Status: DISCONTINUED | OUTPATIENT
Start: 2023-04-13 | End: 2023-04-13 | Stop reason: HOSPADM

## 2023-04-13 RX ORDER — FENTANYL CITRATE 50 UG/ML
INJECTION, SOLUTION INTRAMUSCULAR; INTRAVENOUS AS NEEDED
Status: DISCONTINUED | OUTPATIENT
Start: 2023-04-13 | End: 2023-04-13 | Stop reason: HOSPADM

## 2023-04-13 RX ORDER — HEPARIN SODIUM 1000 [USP'U]/ML
INJECTION, SOLUTION INTRAVENOUS; SUBCUTANEOUS AS NEEDED
Status: DISCONTINUED | OUTPATIENT
Start: 2023-04-13 | End: 2023-04-13 | Stop reason: HOSPADM

## 2023-04-13 RX ORDER — ENOXAPARIN SODIUM 100 MG/ML
40 INJECTION SUBCUTANEOUS EVERY 24 HOURS
Status: DISCONTINUED | OUTPATIENT
Start: 2023-04-13 | End: 2023-04-13 | Stop reason: HOSPADM

## 2023-04-13 RX ORDER — SODIUM CHLORIDE 0.9 % (FLUSH) 0.9 %
5-40 SYRINGE (ML) INJECTION EVERY 8 HOURS
Status: DISCONTINUED | OUTPATIENT
Start: 2023-04-13 | End: 2023-04-13 | Stop reason: HOSPADM

## 2023-04-13 RX ORDER — GUAIFENESIN 100 MG/5ML
81 LIQUID (ML) ORAL DAILY
Status: DISCONTINUED | OUTPATIENT
Start: 2023-04-13 | End: 2023-04-13

## 2023-04-13 RX ORDER — LIDOCAINE HYDROCHLORIDE 10 MG/ML
INJECTION, SOLUTION EPIDURAL; INFILTRATION; INTRACAUDAL; PERINEURAL AS NEEDED
Status: DISCONTINUED | OUTPATIENT
Start: 2023-04-13 | End: 2023-04-13 | Stop reason: HOSPADM

## 2023-04-13 RX ORDER — POTASSIUM CHLORIDE 20 MEQ/1
40 TABLET, EXTENDED RELEASE ORAL
Status: COMPLETED | OUTPATIENT
Start: 2023-04-13 | End: 2023-04-13

## 2023-04-13 RX ORDER — SODIUM CHLORIDE 9 MG/ML
200 INJECTION, SOLUTION INTRAVENOUS CONTINUOUS
Status: DISPENSED | OUTPATIENT
Start: 2023-04-13 | End: 2023-04-13

## 2023-04-13 RX ADMIN — SODIUM CHLORIDE, PRESERVATIVE FREE 10 ML: 5 INJECTION INTRAVENOUS at 10:50

## 2023-04-13 RX ADMIN — SODIUM CHLORIDE, PRESERVATIVE FREE 10 ML: 5 INJECTION INTRAVENOUS at 05:30

## 2023-04-13 RX ADMIN — ISOSORBIDE MONONITRATE 30 MG: 30 TABLET, EXTENDED RELEASE ORAL at 10:48

## 2023-04-13 RX ADMIN — SODIUM CHLORIDE, PRESERVATIVE FREE 10 ML: 5 INJECTION INTRAVENOUS at 13:10

## 2023-04-13 RX ADMIN — TICAGRELOR 60 MG: 60 TABLET ORAL at 07:24

## 2023-04-13 RX ADMIN — POTASSIUM CHLORIDE 40 MEQ: 1500 TABLET, EXTENDED RELEASE ORAL at 07:24

## 2023-04-13 RX ADMIN — ASPIRIN 81 MG: 81 TABLET, CHEWABLE ORAL at 07:24

## 2023-04-13 RX ADMIN — METOPROLOL SUCCINATE 50 MG: 50 TABLET, EXTENDED RELEASE ORAL at 10:48

## 2023-04-13 NOTE — PROGRESS NOTES
POST PROCEDURE NOTE     Kalyani Gonzalez  1955  [id number]    Date of Procedure: 4/13/2023    Preoperative diagnosis: Unstable angina    Postoperative diagnosis: Unstable angina    Procedure: Procedure(s):  LEFT HEART CATH / CORONARY ANGIOGRAPHY  ULTRASOUND GUIDED VASCULAR ACCESS  FRACTIONAL FLOW RESERVE    :  Dr. Kathy Tilley MD    Assistant(s):  None    EBL: 20 mL    Specimens: * No specimens in log *    Findings: Patent stents in the LAD, mid OM 50%, DFR 1.0  Right radial    Okay for outpatient follow up. Will need medication adjustment and investigation for other causes of her chest pain and shortness of breath.     Complications: None    Dr. Kathy Tilley MD  4/13/2023  8:54 AM

## 2023-04-13 NOTE — PROGRESS NOTES
I have reviewed Discharge Instructions with the patient. The patient verbalized understanding. Discharge medications reviewed with patient and appropriate educational materials and side effect teaching was provided. Opportunity for questions and clarification was provided. Venous access removed without difficulty. Patient's belongings gathered and with patient. Patient is ready for discharge.      Elizabeth Ramon RN

## 2023-04-13 NOTE — PROGRESS NOTES
0720  Change of shift report received. CHG bath done at 0530 per night shift RN.    7502  Medicated prior to going off unit for cath lab. Cath lab RN stopped Heparin drip. Alert. Oriented x 4. On room air. No chest pain or shortness of breath.

## 2023-04-13 NOTE — PROGRESS NOTES
Problem: Patient Education: Go to Patient Education Activity  Goal: Patient/Family Education  Outcome: Resolved/Met     Problem: Unstable angina/NSTEMI: Day of Admission/Day 1  Goal: Activity/Safety  Outcome: Resolved/Met  Goal: Consults, if ordered  Outcome: Resolved/Met  Goal: Diagnostic Test/Procedures  Outcome: Resolved/Met  Goal: Nutrition/Diet  Outcome: Resolved/Met  Goal: Discharge Planning  Outcome: Resolved/Met  Goal: Medications  Outcome: Resolved/Met  Goal: Respiratory  Outcome: Resolved/Met  Goal: Treatments/Interventions/Procedures  Outcome: Resolved/Met  Goal: Psychosocial  Outcome: Resolved/Met  Goal: *Hemodynamically stable  Outcome: Resolved/Met

## 2023-04-13 NOTE — DISCHARGE SUMMARY
Hospitalist Discharge Summary     Patient ID:  Lavelle Oliver  [id number]  74 y.o.  1955    PCP on record: Sherron Sandifer, MD    Admit date: 4/11/2023  Discharge date and time: 4/13/2023      DISCHARGE DIAGNOSIS:      Chest pain  Hx of Ischemic heart disease s/p stenting in 2017 and 2019. Had recurring chest pain in 2021 of March where they found 30% stenosis of LAD  HTN  HLD  Bilateral leg swelling  Hx of venous insuficiency      CONSULTATIONS:  IP CONSULT TO CARDIOLOGY    Excerpted HPI from H&P of Stephanie Carreno MD:    Lavelle Oliver is a 74 y.o.  female with ischemic heart disease s/p stenting in March 2021, HTN, HLD, venous insufficiency, who presents with chest pain. Patient notes chest pain last Sunday, substernal, radiating to L arm. Occurring in the morning while taking a shower, had walked dog earlier that day. No associated SOB, nausea, vomiting, diaphoresis. Lasted for 30 min, decreased after she took 2 nitro. Pain did not completely resolve, stated it has lingered on at a 1-2 throughout the week. States her symptoms are currently a 1. States it feels similar to how she felt when she had cardiac stents placed in March 2021. In the ED, trop of 5 and EKG without ischemic changes. Given her cardiac history requiring stents and symptoms described as similar to that presentation, we were asked to admit for work up and evaluation of the above problems. ______________________________________________________________________  DISCHARGE SUMMARY/HOSPITAL COURSE:  for full details see H&P, daily progress notes, labs, consult notes. Chest pain  Hx of Ischemic heart disease s/p stenting in 2017 and 2019.   Had recurring chest pain in 2021 of March where they found 30% stenosis of LAD  HTN  HLD  - Currently chest pain free  - given symptoms similar to her presentation where she was stented, consulted Cardiology, VCS, underwent LHC which no additional intervention was required  - ASA continue 81 daily  - continue home brillinta, metop 50 daily, crestor 40, imdur 30  - heparin gtt dc'd     Bilateral leg swelling  Hx of venous insuficiency  - stopped amlodipine with leg swelling, patient reports being on this for multiple weeks years and does not believe that this is the cause of her lower extremity swelling  - ECHO Left Ventricle: Normal left ventricular systolic function with a visually estimated EF of 60 - 65%. Left ventricle size is normal. Mildly increased wall thickness. Normal wall motion. Diastolic dysfunction present with normal LV EF. Aortic Valve: Tricuspid valve. Aorta: Aorta measures 3.7 cm at the sinuses of Valsalva - probably high normal for age. Ok to resume amlodipine on DC    _______________________________________________________________________  Patient seen and examined by me on discharge day. Pertinent Findings:  Gen:    Not in distress  Chest: Clear lungs  CVS:   Regular rhythm. No edema  Abd:  Soft, not distended, not tender  Neuro:  Alert, Oriented x 4, grossly non focal exam  _______________________________________________________________________  DISCHARGE MEDICATIONS:   Current Discharge Medication List        CONTINUE these medications which have NOT CHANGED    Details   rosuvastatin (CRESTOR) 40 mg tablet TAKE 1 TABLET BY MOUTH NIGHTLY  Qty: 90 Tablet, Refills: 0      amLODIPine (NORVASC) 10 mg tablet TAKE 1 TABLET BY MOUTH EVERY DAY  Qty: 90 Tablet, Refills: 0      ticagrelor (Brilinta) 60 mg tab tablet TAKE 1 TABLET BY MOUTH TWICE A DAY  Qty: 60 Tablet, Refills: 4      isosorbide mononitrate ER (IMDUR) 30 mg tablet TAKE 1 TABLET BY MOUTH EVERY DAY TO PREVENT CHEST PAIN, CALL MD IF SIGNIFICANT HEADACHE  Qty: 90 Tablet, Refills: 1      metoprolol succinate (TOPROL-XL) 50 mg XL tablet Take 1 Tablet by mouth daily. Qty: 90 Tablet, Refills: 3      aspirin 81 mg chewable tablet Take 1 Tab by mouth daily.   Qty: 30 Tab, Refills: 6             My Recommended Diet, Activity, Wound Care, and follow-up labs are listed in the patient's Discharge Insturctions which I have personally completed and reviewed.   Risk of deterioration: Low    Condition at Discharge:  Stable  _____________________________________________________________________    Disposition  Home with family, no needs  ____________________________________________________________________    Care Plan discussed with:   Patient, Family, RN, Care Manager, Consultant    ____________________________________________________________________    Code Status: Full Code  ____________________________________________________________________      Condition at Discharge:  Stable  _____________________________________________________________________  Follow up with:   PCP : Meg Garcia MD  Follow-up Information       Follow up With Specialties Details Why Contact Info    Meg Garcia MD Family Medicine Follow up  [address]  [phone]      Adry Sotomayor MD Interventional Cardiology Physician, Cardiovascular Disease Physician, [address] [facility] Follow up  [address] [place] [address]  [phone]                  Total time in minutes spent coordinating this discharge (includes going over instructions, follow-up, prescriptions, and preparing report for sign off to her PCP) :  35 minutes    Signed:  Concha Arthur MD

## 2023-04-13 NOTE — PROGRESS NOTES
[facility]  [address] [place] [address], [address] [facility]  [phone]  [email or web address]  CARDIOLOGY PROGRESS NOTE    4/13/2023 10:16 AM    Admit Date: 4/11/2023    Admit Diagnosis:   Chest pain of uncertain etiology [N76.0]    Subjective: The patient was seen and examined at the bedside. Patent stent and no other significant CAD noted on cardiac catheterization today. Objective:     Vitals:    04/13/23 1215 04/13/23 1230 04/13/23 1300 04/13/23 1330   BP: (!) 105/58 (!) 124/96 (!) 103/59 108/63   Pulse: 79 88 78 75   Resp: 23 17 15 15   Temp:       SpO2: 96% 96% 96% 95%   Weight:       Height:           Intake/Output Summary (Last 24 hours) at 4/13/2023 1719  Last data filed at 4/13/2023 0507  Gross per 24 hour   Intake 240 ml   Output --   Net 240 ml     Last 3 Recorded Weights in this Encounter    04/11/23 1421 04/11/23 2321 04/12/23 1551   Weight: 66.7 kg (147 lb) 67.6 kg (149 lb 1.6 oz) 67.6 kg (149 lb)       Physical Exam  General: Well developed, in no acute distress, cooperative and alert  HEENT: No JVD. Neck Supple, visual axes parallel, EOM intact. Heart:  Normal S1/S2 negative S3 or S4. Normal rate, regular, no murmur, gallop or rub. Respiratory: Clear bilaterally, no wheezing or rales  Abdomen:   Soft, non-tender, no masses. Extremities:  No edema, no cyanosis, atraumatic. Neuro: A&Ox3, speech clear, gait stable. Skin: Skin color is normal. No rashes or lesions.  Non diaphoretic    Current Facility-Administered Medications   Medication Dose Route Frequency    sodium chloride (NS) flush 5-40 mL  5-40 mL IntraVENous Q8H    sodium chloride (NS) flush 5-40 mL  5-40 mL IntraVENous PRN    zolpidem (AMBIEN) tablet 5 mg  5 mg Oral QHS PRN    enoxaparin (LOVENOX) injection 40 mg  40 mg SubCUTAneous Q24H    nitroglycerin (NITROSTAT) tablet 0.4 mg  0.4 mg SubLINGual Q5MIN PRN    aspirin chewable tablet 81 mg  81 mg Oral DAILY    isosorbide mononitrate ER (IMDUR) tablet 30 mg  30 mg Oral DAILY    metoprolol succinate (TOPROL-XL) XL tablet 50 mg  50 mg Oral DAILY    rosuvastatin (CRESTOR) tablet 40 mg  40 mg Oral QHS    ticagrelor (BRILINTA) tablet 60 mg  60 mg Oral BID    sodium chloride (NS) flush 5-40 mL  5-40 mL IntraVENous Q8H    sodium chloride (NS) flush 5-40 mL  5-40 mL IntraVENous PRN    acetaminophen (TYLENOL) tablet 650 mg  650 mg Oral Q6H PRN    Or    acetaminophen (TYLENOL) suppository 650 mg  650 mg Rectal Q6H PRN    polyethylene glycol (MIRALAX) packet 17 g  17 g Oral DAILY PRN    ondansetron (ZOFRAN ODT) tablet 4 mg  4 mg Oral Q8H PRN    Or    ondansetron (ZOFRAN) injection 4 mg  4 mg IntraVENous Q6H PRN    hydrALAZINE (APRESOLINE) 20 mg/mL injection 20 mg  20 mg IntraVENous Q6H PRN     Current Outpatient Medications   Medication Sig    rosuvastatin (CRESTOR) 40 mg tablet TAKE 1 TABLET BY MOUTH NIGHTLY    amLODIPine (NORVASC) 10 mg tablet TAKE 1 TABLET BY MOUTH EVERY DAY    ticagrelor (Brilinta) 60 mg tab tablet TAKE 1 TABLET BY MOUTH TWICE A DAY    isosorbide mononitrate ER (IMDUR) 30 mg tablet TAKE 1 TABLET BY MOUTH EVERY DAY TO PREVENT CHEST PAIN, CALL MD IF SIGNIFICANT HEADACHE    metoprolol succinate (TOPROL-XL) 50 mg XL tablet Take 1 Tablet by mouth daily. aspirin 81 mg chewable tablet Take 1 Tab by mouth daily.      Current Outpatient Medications   Medication Instructions    amLODIPine (NORVASC) 10 mg tablet TAKE 1 TABLET BY MOUTH EVERY DAY    aspirin 81 mg, Oral, DAILY    isosorbide mononitrate ER (IMDUR) 30 mg tablet TAKE 1 TABLET BY MOUTH EVERY DAY TO PREVENT CHEST PAIN, CALL MD IF SIGNIFICANT HEADACHE    metoprolol succinate (TOPROL-XL) 50 mg, Oral, DAILY    rosuvastatin (CRESTOR) 40 mg, Oral, EVERY BEDTIME    ticagrelor (Brilinta) 60 mg tab tablet TAKE 1 TABLET BY MOUTH TWICE A DAY       Data Review:   Recent Labs     04/13/23 0317 04/12/23  0521 04/11/23  1949   WBC 5.7 6.6 6.1   HGB 12.0 12.8 12.0   HCT 35.7 37.6 35.3    256 235 Recent Labs     04/13/23  0317 04/12/23  0521 04/11/23  1435    137 137   K 3.6 3.9 3.7    106 108   CO2 25 26 26   * 142* 117*   BUN 14 16 20   CREA 0.52* 0.67 0.64   CA 9.2 8.9 9.3   MG 2.1 1.9  --    PHOS 4.0 4.3  --    ALB  --   --  4.2   TBILI  --   --  0.8   ALT  --   --  45     Telemetry: SR    Assessment:     Coronary artery disease  Angina pectoris  MONTEMAYOR    Outpatient follow up for chest pain and shortness of breath      Signed By: Wesley Rowan MD     April 13, 2023

## 2023-04-13 NOTE — PROGRESS NOTES
Shift report received from [facility]     Shift assessment complete, see flow sheet     Uneventful night     Shift report given to RN

## 2023-04-14 NOTE — CARDIO/PULMONARY
Cardiopulmonary Rehab:    Chart reviewed. Pt is a 74 y.o. F admitted with Chest pain of uncertain etiology [O87.5] s/p cardiac cath 4/13/23. Smoking history assessed, pt is not a smoker. EF 60-65% on 4/12/23 per Echo    Heart cath revealed patent stents, per Dr. Sheeba Pierson is not indicated at this time.      Sheryl Wooten

## 2023-05-16 RX ORDER — METOPROLOL SUCCINATE 50 MG/1
TABLET, EXTENDED RELEASE ORAL
Qty: 90 TABLET | Refills: 0 | Status: SHIPPED | OUTPATIENT
Start: 2023-05-16

## 2023-05-16 NOTE — TELEPHONE ENCOUNTER
Refill Request Received for the Following Medication     Requested Prescriptions     Pending Prescriptions Disp Refills    metoprolol succinate (TOPROL XL) 50 MG extended release tablet [Pharmacy Med Name: METOPROLOL SUCC ER 50 MG TAB] 90 tablet 0     Sig: TAKE 1 TABLET BY MOUTH EVERY DAY       Last Prescribed:05-    Last Appointment With Me:  6/14/2022     Future Appointments:  Future Appointments   Date Time Provider Virginia Barros   6/19/2023  8:20 AM MD KRIS Hawthorne AMB

## 2023-06-02 RX ORDER — AMLODIPINE BESYLATE 10 MG/1
TABLET ORAL
Qty: 30 TABLET | Refills: 0 | Status: SHIPPED | OUTPATIENT
Start: 2023-06-02

## 2023-06-02 RX ORDER — ROSUVASTATIN CALCIUM 40 MG/1
TABLET, COATED ORAL NIGHTLY
Qty: 30 TABLET | Refills: 0 | Status: SHIPPED | OUTPATIENT
Start: 2023-06-02

## 2023-06-02 RX ORDER — ISOSORBIDE MONONITRATE 30 MG/1
TABLET, EXTENDED RELEASE ORAL
Qty: 30 TABLET | Refills: 0 | Status: SHIPPED | OUTPATIENT
Start: 2023-06-02

## 2023-06-02 NOTE — TELEPHONE ENCOUNTER
Refill Request Received for the Following Medication     Requested Prescriptions     Pending Prescriptions Disp Refills    rosuvastatin (CRESTOR) 40 MG tablet [Pharmacy Med Name: ROSUVASTATIN CALCIUM 40 MG TAB] 90 tablet      Sig: TAKE 1 TABLET BY MOUTH NIGHTLY    amLODIPine (NORVASC) 10 MG tablet [Pharmacy Med Name: AMLODIPINE BESYLATE 10 MG TAB] 90 tablet      Sig: TAKE 1 TABLET BY MOUTH EVERY DAY    isosorbide mononitrate (IMDUR) 30 MG extended release tablet [Pharmacy Med Name: Deng Counts ER 30 MG TB] 90 tablet 1     Sig: TAKE 1 TABLET BY MOUTH EVERY DAY TO PREVENT CHEST PAIN, CALL MD IF SIGNIFICANT HEADACHE     Last Appointment With Me:  6/14/2022     Future Appointments:  Future Appointments   Date Time Provider Virginia Barros   6/19/2023  8:20 AM MD KRIS Johnson AMB

## 2023-06-29 RX ORDER — AMLODIPINE BESYLATE 10 MG/1
TABLET ORAL
Qty: 30 TABLET | Refills: 0 | Status: SHIPPED | OUTPATIENT
Start: 2023-06-29

## 2023-07-05 RX ORDER — ROSUVASTATIN CALCIUM 40 MG/1
TABLET, COATED ORAL
Qty: 90 TABLET | Refills: 0 | Status: SHIPPED | OUTPATIENT
Start: 2023-07-05

## 2023-07-05 RX ORDER — ISOSORBIDE MONONITRATE 30 MG/1
TABLET, EXTENDED RELEASE ORAL
Qty: 30 TABLET | Refills: 0 | Status: SHIPPED | OUTPATIENT
Start: 2023-07-05

## 2023-07-05 NOTE — TELEPHONE ENCOUNTER
Refill Request Received for the Following Medication     Requested Prescriptions     Pending Prescriptions Disp Refills    isosorbide mononitrate (IMDUR) 30 MG extended release tablet [Pharmacy Med Name: Destiny Goodpasture ER 30 MG TB] 30 tablet 0     Sig: TAKE 1 TABLET BY MOUTH EVERY DAY TO PREVENT CHEST PAIN, CALL MD IF SIGNIFICANT HEADACHE       Last Prescribed:06-02-23    Last Appointment With Me:  6/14/2022     Future Appointments:  Future Appointments   Date Time Provider [address] [place] [address]   7/25/2023  9:00 AM MD KRIS Armijo AMB    Needs to be seen prior to further refills.

## 2023-07-05 NOTE — TELEPHONE ENCOUNTER
Refill Request Received for the Following Medication     Requested Prescriptions     Pending Prescriptions Disp Refills    rosuvastatin (CRESTOR) 40 MG tablet [Pharmacy Med Name: ROSUVASTATIN CALCIUM 40 MG TAB] 30 tablet 0     Sig: TAKE 1 TABLET BY MOUTH EVERY DAY AT NIGHT       Last Prescribed:06-    Last Appointment With Me:  6/14/2022     Future Appointments:  Future Appointments   Date Time Provider [address]   7/25/2023  9:00 AM MD KRIS Garcia AMB

## 2023-07-13 RX ORDER — TICAGRELOR 60 MG/1
TABLET ORAL
Qty: 60 TABLET | Refills: 4 | Status: SHIPPED | OUTPATIENT
Start: 2023-07-13

## 2023-07-13 NOTE — TELEPHONE ENCOUNTER
Refill Request Received for the Following Medication     Requested Prescriptions     Pending Prescriptions Disp Refills    BRILINTA 60 MG TABS tablet [Pharmacy Med Name: BRILINTA 60 MG TABLET] 60 tablet 4     Sig: TAKE 1 TABLET BY MOUTH TWICE A DAY       Last Prescribed:02-    Last Appointment With Me:  6/14/2022     Future Appointments:  Future Appointments   Date Time Provider [address]   7/25/2023  9:00 AM MD KRIS Mayer AMB

## 2023-07-25 ENCOUNTER — OFFICE VISIT (OUTPATIENT)
Age: 68
End: 2023-07-25
Payer: COMMERCIAL

## 2023-07-25 VITALS
WEIGHT: 153 LBS | DIASTOLIC BLOOD PRESSURE: 80 MMHG | BODY MASS INDEX: 27.11 KG/M2 | SYSTOLIC BLOOD PRESSURE: 138 MMHG | RESPIRATION RATE: 16 BRPM | HEART RATE: 74 BPM | HEIGHT: 63 IN | OXYGEN SATURATION: 99 %

## 2023-07-25 DIAGNOSIS — I83.893 VARICOSE VEINS OF BILATERAL LOWER EXTREMITIES WITH OTHER COMPLICATIONS: ICD-10-CM

## 2023-07-25 DIAGNOSIS — I25.10 ATHEROSCLEROSIS OF NATIVE CORONARY ARTERY OF NATIVE HEART WITHOUT ANGINA PECTORIS: Primary | ICD-10-CM

## 2023-07-25 DIAGNOSIS — E78.2 MIXED HYPERLIPIDEMIA: ICD-10-CM

## 2023-07-25 DIAGNOSIS — R00.2 PALPITATIONS: ICD-10-CM

## 2023-07-25 DIAGNOSIS — I10 ESSENTIAL (PRIMARY) HYPERTENSION: ICD-10-CM

## 2023-07-25 DIAGNOSIS — Z98.61 S/P PERCUTANEOUS TRANSLUMINAL CORONARY ANGIOPLASTY: ICD-10-CM

## 2023-07-25 PROCEDURE — 3079F DIAST BP 80-89 MM HG: CPT | Performed by: INTERNAL MEDICINE

## 2023-07-25 PROCEDURE — 1123F ACP DISCUSS/DSCN MKR DOCD: CPT | Performed by: INTERNAL MEDICINE

## 2023-07-25 PROCEDURE — 3075F SYST BP GE 130 - 139MM HG: CPT | Performed by: INTERNAL MEDICINE

## 2023-07-25 PROCEDURE — 93000 ELECTROCARDIOGRAM COMPLETE: CPT | Performed by: INTERNAL MEDICINE

## 2023-07-25 PROCEDURE — 99214 OFFICE O/P EST MOD 30 MIN: CPT | Performed by: INTERNAL MEDICINE

## 2023-07-25 RX ORDER — HYDROCHLOROTHIAZIDE 12.5 MG/1
12.5 TABLET ORAL EVERY MORNING
Qty: 30 TABLET | Refills: 2 | Status: SHIPPED | OUTPATIENT
Start: 2023-07-25

## 2023-07-25 RX ORDER — AMLODIPINE BESYLATE 5 MG/1
5 TABLET ORAL DAILY
Qty: 30 TABLET | Refills: 2 | Status: SHIPPED | OUTPATIENT
Start: 2023-07-25

## 2023-07-25 RX ORDER — AMLODIPINE BESYLATE 10 MG/1
TABLET ORAL
Qty: 30 TABLET | Refills: 0 | OUTPATIENT
Start: 2023-07-25

## 2023-07-25 NOTE — PROGRESS NOTES
[address] [place] [address] [address]  [address] [facility] [place] [address] Anand Romero     Subjective:        Jevon Calles is a 74 y.o. female is here for routine f/u. Last seen by us in June 2022. She presented to [facility] with substernal chest discomfort in April 2023, and had a repeat heart catheterization with patent stent and no significant CAD at that time. She notes that she has prominent lower extremity edema, and recently followed with Dr. Jordana Celeste for her GSV reflux. She has an ultrasound pending with him. The patient denies chest pain/ shortness of breath, orthopnea, PND, LE edema, palpitations, syncope, presyncope or fatigue.     Patient Active Problem List    Diagnosis Date Noted    Chest pain of uncertain etiology 15/88/4405    Acute coronary syndrome ([address]) 03/22/2021    Chest pain 03/22/2021    S/P cardiac cath 03/18/2021    HTN (hypertension) 03/17/2021    HLD (hyperlipidemia) 03/17/2021    Venous insufficiency of both lower extremities 07/21/2020    CAD (coronary artery disease) 07/21/2020    Varicose veins of both legs with edema 07/21/2020    Unstable angina ([address]) 04/26/2017      Carolnie Suarez MD  Past Medical History:   Diagnosis Date    CAD (coronary artery disease)     Hypertension       Past Surgical History:   Procedure Laterality Date    BREAST SURGERY      cystectomy x2    CORONARY ANGIOPLASTY WITH STENT PLACEMENT  04/26/2017    GYN      vaginal wall repair    OTHER SURGICAL HISTORY      surgery for fissure     Allergies   Allergen Reactions    Codeine Nausea And Vomiting      Family History   Problem Relation Age of Onset    Heart Disease Father     Heart Disease Mother       Social History     Socioeconomic History    Marital status:      Spouse name: Not on file    Number of children: Not on file    Years of education: Not on file    Highest education level: Not on file   Occupational History    Not on file   Tobacco

## 2023-07-25 NOTE — PATIENT INSTRUCTIONS
Cut amlodipine/Norvasc in half - 5mg daily. Start hydrochlorothiazide 12.5mg daily. Labs in about 3 weeks - around 8-15-23. We will see you back with [facility] MARIA A KNOX in [address] in about 6 weeks.      [address], [address] [facility], [address]  [address] [place] [address]  Monday-Friday [id number]  [phone] (closed 1-2P)

## 2023-08-01 ENCOUNTER — TELEPHONE (OUTPATIENT)
Age: 68
End: 2023-08-01

## 2023-08-01 NOTE — TELEPHONE ENCOUNTER
Pt called and would like to speak to nurse concerning blood pressure medications,please advise    [phone]

## 2023-08-02 ENCOUNTER — TELEPHONE (OUTPATIENT)
Age: 68
End: 2023-08-02

## 2023-08-02 NOTE — TELEPHONE ENCOUNTER
Verified Patient with two identifiers. Ms. Ethel Omer has been experiencing increase in BP reading since cutting down on Amlodipine on July 25, stated. Daily BP consistently 170\"/80\" and having headaches. Swelling down. BP today 176/89. Please advise.

## 2023-08-02 NOTE — TELEPHONE ENCOUNTER
Please advise the hydrochlorothiazide probably has not time to have its maximal effects, so for now, continue HCTZ, increase amlodipine to 10 mg daily, in a week or so after more time for HCTZ to have affect, we can consider trying 5 mg daily again and see how the blood pressure does. Keep us posted.

## 2023-08-14 RX ORDER — ISOSORBIDE MONONITRATE 30 MG/1
TABLET, EXTENDED RELEASE ORAL
Qty: 90 TABLET | Refills: 3 | Status: SHIPPED | OUTPATIENT
Start: 2023-08-14

## 2023-08-14 NOTE — TELEPHONE ENCOUNTER
Refill Request Received for the Following Medication     Requested Prescriptions     Pending Prescriptions Disp Refills    isosorbide mononitrate (IMDUR) 30 MG extended release tablet [Pharmacy Med Name: Yoni Jameson ER 30 MG TB] 30 tablet 0     Sig: TAKE 1 TABLET BY MOUTH EVERY DAY TO PREVENT CHEST PAIN, CALL MD IF SIGNIFICANT HEADACHE       Last Prescribed: 07-    Last Appointment With Me:  7/25/2023     Future Appointments:  Future Appointments   Date Time Provider [address] [place] [address]   9/25/2023  9:40 AM TIKA Prescott NP [facility] BS AMB

## 2023-08-15 NOTE — TELEPHONE ENCOUNTER
Refill Request Received for the Following Medication     Requested Prescriptions     Pending Prescriptions Disp Refills    metoprolol succinate (TOPROL XL) 50 MG extended release tablet [Pharmacy Med Name: METOPROLOL SUCC ER 50 MG TAB] 90 tablet 0     Sig: TAKE 1 TABLET BY MOUTH EVERY DAY       Last Prescribed: May 2023    Last Appointment With Me:  7/25/2023     Future Appointments:  Future Appointments   Date Time Provider [address] [place] [address]   9/25/2023  9:40 AM TIKA Hope NP [facility] AMB

## 2023-08-16 RX ORDER — METOPROLOL SUCCINATE 50 MG/1
TABLET, EXTENDED RELEASE ORAL
Qty: 90 TABLET | Refills: 3 | Status: SHIPPED | OUTPATIENT
Start: 2023-08-16

## 2023-09-25 DIAGNOSIS — I10 ESSENTIAL (PRIMARY) HYPERTENSION: ICD-10-CM

## 2023-09-25 RX ORDER — HYDROCHLOROTHIAZIDE 12.5 MG/1
12.5 TABLET ORAL EVERY MORNING
Qty: 90 TABLET | Refills: 0 | Status: SHIPPED | OUTPATIENT
Start: 2023-09-25

## 2023-09-25 RX ORDER — AMLODIPINE BESYLATE 5 MG/1
5 TABLET ORAL DAILY
Qty: 90 TABLET | Refills: 0 | Status: SHIPPED | OUTPATIENT
Start: 2023-09-25

## 2023-09-25 NOTE — TELEPHONE ENCOUNTER
Requested Prescriptions     Signed Prescriptions Disp Refills    amLODIPine (NORVASC) 5 MG tablet 90 tablet 0     Sig: TAKE 1 TABLET BY MOUTH EVERY DAY     Authorizing Provider: Brittani Roberts     Ordering User: AMBREEN COOMBS    hydroCHLOROthiazide (HYDRODIURIL) 12.5 MG tablet 90 tablet 0     Sig: TAKE 1 TABLET BY MOUTH EVERY DAY IN THE MORNING     Authorizing Provider: Brittani Roberts     Ordering User: Rajwinder Velasquez     Verbal order per Dr. Kaylan Pandya.      Future Appointments   Date Time Provider [address]  [address]   10/30/2023  9:00 AM Jackelyn Jeffrey, APRN - NP [facility]

## 2023-10-12 DIAGNOSIS — E78.2 MIXED HYPERLIPIDEMIA: Primary | ICD-10-CM

## 2023-10-12 RX ORDER — ROSUVASTATIN CALCIUM 40 MG/1
TABLET, COATED ORAL
Qty: 90 TABLET | Refills: 0 | OUTPATIENT
Start: 2023-10-12

## 2023-10-24 ENCOUNTER — TELEPHONE (OUTPATIENT)
Age: 68
End: 2023-10-24

## 2023-10-24 RX ORDER — ROSUVASTATIN CALCIUM 40 MG/1
40 TABLET, COATED ORAL NIGHTLY
Qty: 30 TABLET | Refills: 0 | Status: SHIPPED | OUTPATIENT
Start: 2023-10-24

## 2023-10-24 NOTE — TELEPHONE ENCOUNTER
Spoke with patient, verified with 2 identifiers. This nurse advised patient to have lipids done  as soon as she can. Medication refilled until next appt. Patient voiced understanding.

## 2023-10-24 NOTE — TELEPHONE ENCOUNTER
Patient called ?  Her denial on her medication patient is completely out of her medication would like a callback    Patient# [phone]

## 2023-11-01 DIAGNOSIS — E78.2 MIXED HYPERLIPIDEMIA: ICD-10-CM

## 2023-11-01 DIAGNOSIS — I10 ESSENTIAL (PRIMARY) HYPERTENSION: ICD-10-CM

## 2023-11-01 DIAGNOSIS — I25.10 ATHEROSCLEROSIS OF NATIVE CORONARY ARTERY OF NATIVE HEART WITHOUT ANGINA PECTORIS: ICD-10-CM

## 2023-11-02 LAB
ANION GAP SERPL CALC-SCNC: 4 MMOL/L (ref 5–15)
BUN SERPL-MCNC: 13 MG/DL (ref 6–20)
BUN/CREAT SERPL: 18 (ref 12–20)
CALCIUM SERPL-MCNC: 9.2 MG/DL (ref 8.5–10.1)
CHLORIDE SERPL-SCNC: 107 MMOL/L (ref 97–108)
CHOLEST SERPL-MCNC: 133 MG/DL
CO2 SERPL-SCNC: 29 MMOL/L (ref 21–32)
CREAT SERPL-MCNC: 0.71 MG/DL (ref 0.55–1.02)
GLUCOSE SERPL-MCNC: 133 MG/DL (ref 65–100)
HDLC SERPL-MCNC: 59 MG/DL
HDLC SERPL: 2.3 (ref 0–5)
LDLC SERPL CALC-MCNC: 52.8 MG/DL (ref 0–100)
MAGNESIUM SERPL-MCNC: 1.9 MG/DL (ref 1.6–2.4)
POTASSIUM SERPL-SCNC: 4.4 MMOL/L (ref 3.5–5.1)
SODIUM SERPL-SCNC: 140 MMOL/L (ref 136–145)
TRIGL SERPL-MCNC: 106 MG/DL
VLDLC SERPL CALC-MCNC: 21.2 MG/DL

## 2023-11-06 ENCOUNTER — OFFICE VISIT (OUTPATIENT)
Age: 68
End: 2023-11-06
Payer: COMMERCIAL

## 2023-11-06 VITALS
RESPIRATION RATE: 16 BRPM | OXYGEN SATURATION: 97 % | HEIGHT: 63 IN | DIASTOLIC BLOOD PRESSURE: 86 MMHG | HEART RATE: 90 BPM | WEIGHT: 148 LBS | SYSTOLIC BLOOD PRESSURE: 130 MMHG | BODY MASS INDEX: 26.22 KG/M2

## 2023-11-06 DIAGNOSIS — I83.893 VARICOSE VEINS OF BILATERAL LOWER EXTREMITIES WITH OTHER COMPLICATIONS: ICD-10-CM

## 2023-11-06 DIAGNOSIS — Z98.61 S/P PERCUTANEOUS TRANSLUMINAL CORONARY ANGIOPLASTY: ICD-10-CM

## 2023-11-06 DIAGNOSIS — E78.2 MIXED HYPERLIPIDEMIA: ICD-10-CM

## 2023-11-06 DIAGNOSIS — I10 ESSENTIAL (PRIMARY) HYPERTENSION: ICD-10-CM

## 2023-11-06 DIAGNOSIS — R00.2 PALPITATIONS: ICD-10-CM

## 2023-11-06 DIAGNOSIS — I25.10 ATHEROSCLEROSIS OF NATIVE CORONARY ARTERY OF NATIVE HEART WITHOUT ANGINA PECTORIS: Primary | ICD-10-CM

## 2023-11-06 PROCEDURE — 3075F SYST BP GE 130 - 139MM HG: CPT | Performed by: INTERNAL MEDICINE

## 2023-11-06 PROCEDURE — 1123F ACP DISCUSS/DSCN MKR DOCD: CPT | Performed by: INTERNAL MEDICINE

## 2023-11-06 PROCEDURE — 99214 OFFICE O/P EST MOD 30 MIN: CPT | Performed by: INTERNAL MEDICINE

## 2023-11-06 PROCEDURE — 93000 ELECTROCARDIOGRAM COMPLETE: CPT | Performed by: INTERNAL MEDICINE

## 2023-11-06 PROCEDURE — 3079F DIAST BP 80-89 MM HG: CPT | Performed by: INTERNAL MEDICINE

## 2023-11-06 RX ORDER — NITROGLYCERIN 0.4 MG/1
0.4 TABLET SUBLINGUAL EVERY 5 MIN PRN
Qty: 25 TABLET | Refills: 0 | Status: SHIPPED | OUTPATIENT
Start: 2023-11-06

## 2023-11-06 RX ORDER — AMLODIPINE BESYLATE 5 MG/1
5 TABLET ORAL DAILY
Qty: 90 TABLET | Refills: 0 | Status: SHIPPED | OUTPATIENT
Start: 2023-11-06

## 2023-11-06 RX ORDER — OLMESARTAN MEDOXOMIL 20 MG/1
20 TABLET ORAL DAILY
Qty: 90 TABLET | Refills: 3 | Status: SHIPPED | OUTPATIENT
Start: 2023-11-06

## 2023-11-06 ASSESSMENT — PATIENT HEALTH QUESTIONNAIRE - PHQ9
2. FEELING DOWN, DEPRESSED OR HOPELESS: 0
SUM OF ALL RESPONSES TO PHQ9 QUESTIONS 1 & 2: 0
SUM OF ALL RESPONSES TO PHQ QUESTIONS 1-9: 0
1. LITTLE INTEREST OR PLEASURE IN DOING THINGS: 0

## 2023-11-06 NOTE — PROGRESS NOTES
[address] [place] [address] [address]  [address] [facility] [place] [address] Anand Romero     Subjective:        Liam Thomas is a 79 y.o. female is here for routine f/u. Last seen by us 7/25/2023. The patient had only 1 episode of substernal chest discomfort that resolved with 1 nitroglycerin. She requests a refill of nitroglycerin. Denies shortness of breath, orthopnea, PND, LE edema, palpitations, syncope, presyncope or fatigue. Patient Active Problem List    Diagnosis Date Noted    Chest pain of uncertain etiology 69/55/7577    Acute coronary syndrome ([address]) 03/22/2021    Chest pain 03/22/2021    S/P cardiac cath 03/18/2021    HTN (hypertension) 03/17/2021    HLD (hyperlipidemia) 03/17/2021    Venous insufficiency of both lower extremities 07/21/2020    CAD (coronary artery disease) 07/21/2020    Varicose veins of both legs with edema 07/21/2020    Unstable angina ([address]) 04/26/2017      Kash Gonzalez MD  Past Medical History:   Diagnosis Date    CAD (coronary artery disease)     Hypertension       Past Surgical History:   Procedure Laterality Date    BREAST SURGERY      cystectomy x2    CORONARY ANGIOPLASTY WITH STENT PLACEMENT  04/26/2017    GYN      vaginal wall repair    OTHER SURGICAL HISTORY      surgery for fissure     Allergies   Allergen Reactions    Codeine Nausea And Vomiting      Family History   Problem Relation Age of Onset    Heart Disease Father     Heart Disease Mother       Social History     Socioeconomic History    Marital status:      Spouse name: Not on file    Number of children: Not on file    Years of education: Not on file    Highest education level: Not on file   Occupational History    Not on file   Tobacco Use    Smoking status: Never     Passive exposure: Never    Smokeless tobacco: Never   Substance and Sexual Activity    Alcohol use:  Yes     Alcohol/week: 4.0 standard drinks of alcohol     Types: 4 Cans of beer per week     Comment: 4 a

## 2023-11-22 RX ORDER — ROSUVASTATIN CALCIUM 40 MG/1
40 TABLET, COATED ORAL NIGHTLY
Qty: 90 TABLET | Refills: 3 | Status: SHIPPED | OUTPATIENT
Start: 2023-11-22

## 2023-12-26 RX ORDER — TICAGRELOR 60 MG/1
TABLET ORAL
Qty: 180 TABLET | Refills: 1 | Status: SHIPPED | OUTPATIENT
Start: 2023-12-26

## 2023-12-26 NOTE — TELEPHONE ENCOUNTER
Refill Request Received for the Following Medication     Requested Prescriptions     Pending Prescriptions Disp Refills    BRILINTA 60 MG TABS tablet [Pharmacy Med Name: BRILINTA 60 MG TABLET] 60 tablet 4     Sig: TAKE 1 TABLET BY MOUTH TWICE A DAY       Last Prescribed: 07-    Last Appointment With Me:  11/6/2023     Future Appointments:  Future Appointments   Date Time Provider [address]   1/2/2024  2:00 PM MD [address]IS [address] AMB

## 2023-12-29 ENCOUNTER — TELEPHONE (OUTPATIENT)
Age: 68
End: 2023-12-29

## 2023-12-29 NOTE — TELEPHONE ENCOUNTER
Pt need to change nurse visit on 1/2/24, would like to reschedule, cannot schedule (schedule blocked)      Pt# [phone]

## 2024-01-08 ENCOUNTER — TELEPHONE (OUTPATIENT)
Age: 69
End: 2024-01-08

## 2024-01-08 NOTE — TELEPHONE ENCOUNTER
Spoke with patient, verified with 2 identifiers. Ms. Lin stated that her morning BP before medications is always normal but in the early afternoon BP drops. Patient hydrates well everyday with 8 glasses of water a day, stated.      Patient currently taking amlodipine 5mg a day. HCTZ 12.5 mg daily and Benicar 20 mg.  Noted that in last office visit in Nov  Dr. Scott stated \" may be we can eventually go to Benicar only and discontinue or reduce amlodipine further\"    Message sent to dr. Scott for advice.

## 2024-01-09 NOTE — TELEPHONE ENCOUNTER
If occasional low blood pressure of  without any symptoms, does not need to change her medicines.  If getting frequent blood pressures less than 100 or if feeling lightheaded or dizzy would first cut amlodipine in half and/or discontinue amlodipine altogether.  Let us know if still with low blood pressures after that change.

## 2024-01-09 NOTE — TELEPHONE ENCOUNTER
.Spoke with patient , verified patient with two identifiers, regarding results and recommendations. Patient voiced understanding.     Patient will start cutting Amlodipine in half and call back in 10 day.

## 2024-01-17 DIAGNOSIS — I10 ESSENTIAL (PRIMARY) HYPERTENSION: ICD-10-CM

## 2024-01-18 RX ORDER — HYDROCHLOROTHIAZIDE 12.5 MG/1
12.5 TABLET ORAL EVERY MORNING
Qty: 90 TABLET | Refills: 2 | Status: SHIPPED | OUTPATIENT
Start: 2024-01-18

## 2024-01-18 NOTE — TELEPHONE ENCOUNTER
Refill Request Received for the Following Medication     Requested Prescriptions     Pending Prescriptions Disp Refills    hydroCHLOROthiazide (HYDRODIURIL) 12.5 MG tablet [Pharmacy Med Name: HYDROCHLOROTHIAZIDE 12.5 MG TB] 90 tablet 0     Sig: TAKE 1 TABLET BY MOUTH EVERY DAY IN THE MORNING       Last Prescribed:09-    Last Appointment With Me:  11/6/2023     Future Appointments:  No future appointments.

## 2024-01-22 ENCOUNTER — PATIENT MESSAGE (OUTPATIENT)
Age: 69
End: 2024-01-22

## 2024-01-23 ENCOUNTER — TELEPHONE (OUTPATIENT)
Age: 69
End: 2024-01-23

## 2024-01-23 NOTE — TELEPHONE ENCOUNTER
My chart message received.  \"I continue to have sudden drops in my BP every day, mid-afternoon accompanied by symptoms of feeling light headed and dizzy. As advised I cut amlodipine dose in half starting January 9. I take my BP every morning before taking my medications and then again in the afternoon or when I experience symptoms. Should I discontinue the amlodipine altogether?     Please advise.

## 2024-01-23 NOTE — TELEPHONE ENCOUNTER
Please advise, can discontinue amlodipine as long as blood pressure is remaining below 145 systolic.  If blood pressure is above that, she should take the lowest dose that she recently took.  Let us know if systolic blood pressures less than 90 or generally greater than 150.

## 2024-01-23 NOTE — TELEPHONE ENCOUNTER
Per Dr. Scott: \"Please advise, can discontinue amlodipine as long as blood pressure is remaining below 145 systolic. If blood pressure is above that, she should take the lowest dose that she recently took. Let us know if systolic blood pressures less than 90 or generally greater than 150.\"

## 2024-08-12 RX ORDER — METOPROLOL SUCCINATE 50 MG/1
TABLET, EXTENDED RELEASE ORAL
Qty: 90 TABLET | Refills: 0 | Status: SHIPPED | OUTPATIENT
Start: 2024-08-12

## 2024-08-12 NOTE — TELEPHONE ENCOUNTER
Refill Request Received for the Following Medication     Requested Prescriptions     Pending Prescriptions Disp Refills    metoprolol succinate (TOPROL XL) 50 MG extended release tablet [Pharmacy Med Name: METOPROLOL SUCC ER 50 MG TAB] 90 tablet 3     Sig: TAKE 1 TABLET BY MOUTH EVERY DAY       Last Prescribed:08-    Last Appointment With Me:  11/6/2023     Future Appointments:  No future appointments.

## 2024-10-17 DIAGNOSIS — I10 ESSENTIAL (PRIMARY) HYPERTENSION: ICD-10-CM

## 2024-10-17 RX ORDER — HYDROCHLOROTHIAZIDE 12.5 MG/1
12.5 TABLET ORAL EVERY MORNING
Qty: 30 TABLET | Refills: 0 | Status: SHIPPED | OUTPATIENT
Start: 2024-10-17

## 2024-11-11 RX ORDER — METOPROLOL SUCCINATE 50 MG/1
50 TABLET, EXTENDED RELEASE ORAL DAILY
Qty: 30 TABLET | Refills: 0 | Status: SHIPPED | OUTPATIENT
Start: 2024-11-11

## 2024-11-18 DIAGNOSIS — I10 ESSENTIAL (PRIMARY) HYPERTENSION: ICD-10-CM

## 2024-11-18 RX ORDER — HYDROCHLOROTHIAZIDE 12.5 MG/1
12.5 TABLET ORAL EVERY MORNING
Qty: 90 TABLET | Refills: 1 | OUTPATIENT
Start: 2024-11-18

## 2024-11-22 DIAGNOSIS — Z98.61 S/P PERCUTANEOUS TRANSLUMINAL CORONARY ANGIOPLASTY: ICD-10-CM

## 2024-11-22 DIAGNOSIS — E78.5 HLD (HYPERLIPIDEMIA): Primary | ICD-10-CM

## 2024-11-22 DIAGNOSIS — I87.2 VENOUS INSUFFICIENCY OF BOTH LOWER EXTREMITIES: ICD-10-CM

## 2024-11-22 DIAGNOSIS — E78.2 MIXED HYPERLIPIDEMIA: ICD-10-CM

## 2024-11-22 DIAGNOSIS — I24.9 ACUTE CORONARY SYNDROME (HCC): ICD-10-CM

## 2024-11-22 DIAGNOSIS — I25.10 CAD (CORONARY ARTERY DISEASE): ICD-10-CM

## 2024-11-22 DIAGNOSIS — I10 ESSENTIAL (PRIMARY) HYPERTENSION: ICD-10-CM

## 2024-11-22 RX ORDER — HYDROCHLOROTHIAZIDE 12.5 MG/1
12.5 TABLET ORAL EVERY MORNING
Qty: 30 TABLET | Refills: 0 | Status: SHIPPED | OUTPATIENT
Start: 2024-11-22

## 2024-11-22 NOTE — TELEPHONE ENCOUNTER
Noted last refill on 10- for 30 days in order for patient to get blood work done.  Appointment and blood work needed for further refills.   This nurse called patient. Message left.

## 2024-12-10 RX ORDER — METOPROLOL SUCCINATE 50 MG/1
50 TABLET, EXTENDED RELEASE ORAL DAILY
Qty: 90 TABLET | Refills: 1 | OUTPATIENT
Start: 2024-12-10

## 2024-12-11 RX ORDER — METOPROLOL SUCCINATE 50 MG/1
50 TABLET, EXTENDED RELEASE ORAL DAILY
Qty: 30 TABLET | Refills: 0 | OUTPATIENT
Start: 2024-12-11

## 2025-05-11 ENCOUNTER — HOSPITAL ENCOUNTER (EMERGENCY)
Facility: HOSPITAL | Age: 70
Discharge: HOME | End: 2025-05-11
Attending: EMERGENCY MEDICINE
Payer: COMMERCIAL

## 2025-05-11 VITALS
OXYGEN SATURATION: 98 % | SYSTOLIC BLOOD PRESSURE: 152 MMHG | DIASTOLIC BLOOD PRESSURE: 77 MMHG | HEIGHT: 63 IN | TEMPERATURE: 98 F | HEART RATE: 89 BPM | BODY MASS INDEX: 22.97 KG/M2 | WEIGHT: 129.63 LBS | RESPIRATION RATE: 18 BRPM
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
## 2025-05-11 DIAGNOSIS — S51.811A LACERATION OF SKIN OF RIGHT FOREARM, INITIAL ENCOUNTER: ICD-10-CM

## 2025-05-11 PROCEDURE — 6370000000 HC RX 637 (ALT 250 FOR IP): Performed by: EMERGENCY MEDICINE

## 2025-05-11 PROCEDURE — 90714 TD VACC NO PRESV 7 YRS+ IM: CPT | Performed by: EMERGENCY MEDICINE

## 2025-05-11 PROCEDURE — 99284 EMERGENCY DEPT VISIT MOD MDM: CPT

## 2025-05-11 PROCEDURE — 12002 RPR S/N/AX/GEN/TRNK2.6-7.5CM: CPT

## 2025-05-11 PROCEDURE — 6360000002 HC RX W HCPCS: Performed by: EMERGENCY MEDICINE

## 2025-05-11 PROCEDURE — 90471 IMMUNIZATION ADMIN: CPT | Performed by: EMERGENCY MEDICINE

## 2025-05-11 RX ORDER — TRAZODONE HYDROCHLORIDE 50 MG/1
50 TABLET ORAL NIGHTLY PRN
COMMUNITY

## 2025-05-11 RX ORDER — GINSENG 100 MG
CAPSULE ORAL
Status: COMPLETED | OUTPATIENT
Start: 2025-05-11 | End: 2025-05-11

## 2025-05-11 RX ORDER — DIPHENHYDRAMINE HCL 25 MG
50 CAPSULE ORAL
Status: DISCONTINUED | OUTPATIENT
Start: 2025-05-11 | End: 2025-05-11

## 2025-05-11 RX ORDER — TIRZEPATIDE 2.5 MG/.5ML
INJECTION, SOLUTION SUBCUTANEOUS
COMMUNITY
End: 2025-05-12

## 2025-05-11 RX ORDER — LIDOCAINE HYDROCHLORIDE AND EPINEPHRINE 10; 10 MG/ML; UG/ML
20 INJECTION, SOLUTION INFILTRATION; PERINEURAL
Status: COMPLETED | OUTPATIENT
Start: 2025-05-11 | End: 2025-05-11

## 2025-05-11 RX ADMIN — LIDOCAINE HYDROCHLORIDE AND EPINEPHRINE 20 ML: 10; 10 INJECTION, SOLUTION INFILTRATION; PERINEURAL at 21:08

## 2025-05-11 RX ADMIN — Medication: at 22:17

## 2025-05-11 RX ADMIN — CLOSTRIDIUM TETANI TOXOID ANTIGEN (FORMALDEHYDE INACTIVATED) AND CORYNEBACTERIUM DIPHTHERIAE TOXOID ANTIGEN (FORMALDEHYDE INACTIVATED) 0.5 ML: 5; 2 INJECTION, SUSPENSION INTRAMUSCULAR at 21:09

## 2025-05-11 RX ADMIN — AMOXICILLIN AND CLAVULANATE POTASSIUM 1 TABLET: 875; 125 TABLET, FILM COATED ORAL at 21:09

## 2025-05-11 ASSESSMENT — PAIN DESCRIPTION - FREQUENCY: FREQUENCY: INTERMITTENT

## 2025-05-11 ASSESSMENT — LIFESTYLE VARIABLES
HOW MANY STANDARD DRINKS CONTAINING ALCOHOL DO YOU HAVE ON A TYPICAL DAY: PATIENT DOES NOT DRINK
HOW OFTEN DO YOU HAVE A DRINK CONTAINING ALCOHOL: NEVER

## 2025-05-11 ASSESSMENT — PAIN - FUNCTIONAL ASSESSMENT
PAIN_FUNCTIONAL_ASSESSMENT: ACTIVITIES ARE NOT PREVENTED
PAIN_FUNCTIONAL_ASSESSMENT: 0-10

## 2025-05-11 ASSESSMENT — PAIN DESCRIPTION - ONSET: ONSET: ON-GOING

## 2025-05-11 ASSESSMENT — PAIN DESCRIPTION - DESCRIPTORS: DESCRIPTORS: ACHING

## 2025-05-11 ASSESSMENT — PAIN DESCRIPTION - PAIN TYPE: TYPE: ACUTE PAIN

## 2025-05-11 ASSESSMENT — PAIN DESCRIPTION - LOCATION: LOCATION: ARM

## 2025-05-11 ASSESSMENT — PAIN SCALES - GENERAL: PAINLEVEL_OUTOF10: 3

## 2025-05-11 ASSESSMENT — PAIN DESCRIPTION - ORIENTATION: ORIENTATION: RIGHT

## 2025-05-12 ENCOUNTER — HOSPITAL ENCOUNTER (EMERGENCY)
Facility: HOSPITAL | Age: 70
Discharge: HOME | End: 2025-05-12

## 2025-05-12 ENCOUNTER — OFFICE VISIT (OUTPATIENT)
Age: 70
End: 2025-05-12

## 2025-05-12 VITALS
HEART RATE: 88 BPM | TEMPERATURE: 98.1 F | SYSTOLIC BLOOD PRESSURE: 116 MMHG | RESPIRATION RATE: 16 BRPM | OXYGEN SATURATION: 100 % | WEIGHT: 128 LBS | BODY MASS INDEX: 22.67 KG/M2 | DIASTOLIC BLOOD PRESSURE: 71 MMHG

## 2025-05-12 DIAGNOSIS — L08.9 INFECTED DOG BITE: Primary | ICD-10-CM
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
## 2025-05-12 RX ORDER — ROSUVASTATIN CALCIUM 20 MG/1
TABLET, COATED ORAL
COMMUNITY
Start: 2025-05-07

## 2025-05-12 RX ORDER — ISOSORBIDE MONONITRATE 60 MG/1
90 TABLET, EXTENDED RELEASE ORAL DAILY
COMMUNITY
Start: 2025-03-14

## 2025-05-12 RX ORDER — TIRZEPATIDE 5 MG/.5ML
INJECTION, SOLUTION SUBCUTANEOUS
COMMUNITY

## 2025-05-12 RX ORDER — EVOLOCUMAB 140 MG/ML
INJECTION, SOLUTION SUBCUTANEOUS
COMMUNITY
Start: 2025-04-28

## 2025-05-12 RX ORDER — MOXIFLOXACIN HYDROCHLORIDE 400 MG/1
400 TABLET ORAL DAILY
Qty: 7 TABLET | Refills: 0 | Status: SHIPPED | OUTPATIENT
Start: 2025-05-12 | End: 2025-05-19

## 2025-05-12 NOTE — PATIENT INSTRUCTIONS
Continue Augmentin    Start moxifloxacin 1 pill once daily for 7 days    Monitor the area of redness and swelling with a skin pen.  If it gets worse, or does not get better after 48 hours of antibiotics, you need to go to the emergency department for IV antibiotics

## 2025-05-12 NOTE — PROGRESS NOTES
Chelly Lin (:  1955) is a 69 y.o. female,New patient, here for evaluation of the following chief complaint(s):  Rash (Rash to Right hand since this morning. Dog bite to Right forearm yesterday. Was seen at [facility] ER last night and received 6 sutures and was started on antibiotics. Last Td on 25.)      ASSESSMENT/PLAN:  Visit Diagnoses and Associated Orders         Infected dog bite    -  Primary    moxifloxacin (AVELOX) 400 MG tablet [[id number]]           ORDERS WITHOUT AN ASSOCIATED DIAGNOSIS    MOUNJARO 5 MG/0.5ML SOAJ pen [[id number]]      rosuvastatin (CRESTOR) 20 MG tablet [[id number]]      isosorbide mononitrate (IMDUR) 60 MG extended release tablet [[id number]]      REPATHA SURECLICK SOAJ pen [[id number]]             I am concerned about an infected dog bite and spoke with Dr. Giovanni Hitchcock about the infection as well as send a picture.  We will start moxifloxacin 1 pill once daily for 7 days and closely monitor the area of swelling and redness.      If the swelling and redness worsens or does not improve after 48 hours of medication, she will need to go to the emergency department for further evaluation as well as IV antibiotics    We cleaned the wound, marked it with a skin pen and dressed it.    Her son is a nursing school and will come assess her tomorrow    SUBJECTIVE/OBJECTIVE:  HPI     69 y.o. female presents with dog bite to the right forearm that occurred last night and now with a rash to the right hand.  She was seen in [place] emergency department yesterday following a dog bite.  Area was cleaned and irrigated.  They decided to place a few sutures and as it was continuing to bleed and was gaping.  She was started on Augmentin, she took 1 dose last night in the emergency department and had 1 dose this morning.  She noticed a rash to the right hand and has decided to come in for further evaluation of the rash.  She denies a rash on any other part of her body.  The rash does not itch.  Denies fever

## 2025-05-12 NOTE — ED PROVIDER NOTES
[facility]  EMERGENCY DEPARTMENT ENCOUNTER      Pt Name: Chelly Lin  MRN: [id number]  Birthdate 1955  Date of evaluation: 5/11/2025  Provider: Analia Whipple MD    CHIEF COMPLAINT       Chief Complaint   Patient presents with    Animal Bite         HISTORY OF PRESENT ILLNESS   (Location/Symptom, Timing/Onset, Context/Setting, Quality, Duration, Modifying Factors, Severity)  Note limiting factors.   Patient is a 69-year-old who comes into the emergency department after sustaining a bite to her right forearm by a dog that was unknown to her but identified on site.  The dog's owner reported the dog is up-to-date with vaccinations.  The patient's tetanus is out of date.  She denies any other injury.    The history is provided by the patient.         Review of External Medical Records:     Nursing Notes were reviewed.    REVIEW OF SYSTEMS    (2-9 systems for level 4, 10 or more for level 5)     Review of Systems    Except as noted above the remainder of the review of systems was reviewed and negative.       PAST MEDICAL HISTORY     Past Medical History:   Diagnosis Date    CAD (coronary artery disease)     Hypertension          SURGICAL HISTORY       Past Surgical History:   Procedure Laterality Date    BREAST SURGERY      cystectomy x2    CORONARY ANGIOPLASTY WITH STENT PLACEMENT  04/26/2017    GYN      vaginal wall repair    OTHER SURGICAL HISTORY      surgery for fissure         CURRENT MEDICATIONS       Current Discharge Medication List        CONTINUE these medications which have NOT CHANGED    Details   Tirzepatide (MOUNJARO) 2.5 MG/0.5ML SOAJ pen by NOT APPLICABLE route      traZODone (DESYREL) 50 MG tablet Take 1 tablet by mouth nightly as needed      hydroCHLOROthiazide 12.5 MG tablet Take 1 tablet by mouth every morning Please call to schedule follow up. Blood work needed.  Qty: 30 tablet, Refills: 0    Associated Diagnoses: Essential (primary) hypertension      metoprolol

## 2025-05-12 NOTE — ED TRIAGE NOTES
[facility] Nursing Note        Patient Name: Chelly Lin      : 1955             MRN: [id number]      Chief Complaint:  Animal Bite      Admit Diagnosis: No admission diagnoses are documented for this encounter.      Admitting Provider: No admitting provider for patient encounter.      Surgery: * No surgery found *           Patient arrived to the ER ambulatory from home with complaints of a Dog Bite at her Right Forearm today. Pt states it was a strangers Dog but was that dog was up to date on shot. Pt states being on Brilinta for Angina.         Lines:        Signed by: Jeff Fraser RN, DAVIN, BSN, CMSRN                                              2025 at 8:38 PM

## 2025-05-29 ENCOUNTER — HOSPITAL ENCOUNTER (EMERGENCY)
Facility: HOSPITAL | Age: 70
Discharge: HOME | End: 2025-05-29
Attending: EMERGENCY MEDICINE
Payer: COMMERCIAL

## 2025-05-29 VITALS
SYSTOLIC BLOOD PRESSURE: 118 MMHG | RESPIRATION RATE: 18 BRPM | DIASTOLIC BLOOD PRESSURE: 65 MMHG | TEMPERATURE: 98.2 F | WEIGHT: 128.09 LBS | OXYGEN SATURATION: 97 % | HEART RATE: 77 BPM | HEIGHT: 63 IN | BODY MASS INDEX: 22.7 KG/M2

## 2025-05-29 DIAGNOSIS — L08.9 SKIN PUSTULE: ICD-10-CM

## 2025-05-29 DIAGNOSIS — Z29.14 ENCOUNTER FOR PROPHYLACTIC ADMINISTRATION OF RABIES IMMUNE GLOBULIN: Primary | ICD-10-CM

## 2025-05-29 PROCEDURE — 6360000002 HC RX W HCPCS: Performed by: PHYSICIAN ASSISTANT

## 2025-05-29 PROCEDURE — 90675 RABIES VACCINE IM: CPT | Performed by: PHYSICIAN ASSISTANT

## 2025-05-29 PROCEDURE — 90471 IMMUNIZATION ADMIN: CPT | Performed by: PHYSICIAN ASSISTANT

## 2025-05-29 PROCEDURE — 96372 THER/PROPH/DIAG INJ SC/IM: CPT

## 2025-05-29 PROCEDURE — 99284 EMERGENCY DEPT VISIT MOD MDM: CPT

## 2025-05-29 PROCEDURE — 90375 RABIES IG IM/SC: CPT | Performed by: PHYSICIAN ASSISTANT

## 2025-05-29 RX ADMIN — LIDOCAINE HYDROCHLORIDE 1000 MG: 10 SOLUTION INTRAVENOUS at 18:27

## 2025-05-29 RX ADMIN — RABIES IMMUNE GLOBULIN (HUMAN) 1170 UNITS: 300 INJECTION, SOLUTION INFILTRATION; INTRAMUSCULAR at 18:08

## 2025-05-29 RX ADMIN — RABIES VACCINE 1 ML: KIT at 18:12

## 2025-05-29 ASSESSMENT — PAIN SCALES - GENERAL: PAINLEVEL_OUTOF10: 0

## 2025-05-29 NOTE — ED TRIAGE NOTES
Pt c/o dog bit to her right FA approx 2 weeks ago. Pt has been unable to find owner of dog to check vaccination status of animal. Pt requesting Rabies vaccine.

## 2025-05-29 NOTE — DISCHARGE INSTRUCTIONS
Continue to follow your rabies vaccine schedule:  Prescription date: 5/29/2025 Time: 6:11 PM   Day 0 - 5/29/2025 - Given in ED today  Day 3 - 6/01/2025   Day 7- 6/05/2025   Day 14- 6/12/2025,   Take oral antibiotic as prescribed to completion.  Take medication as prescribed.  Return immediately if any new or worsening symptoms.  Thank you for allowing us to be a part of your care.

## 2025-05-29 NOTE — CARE COORDINATION
ED Case Management:     Received call from [place] ED regarding consult for follow-up rabies information.     Called patient at [phone]. Left voicemail.     Called [place] ED to speak with patient. Spoke with patient's RN. She was giving patient her last vaccine when patient saw the call from this CM. She will call this CM back.     Patient called CM back. CM spoke with patient regarding follow-up. CM verified demographic information.  CM explained follow-up vaccines needed on Day 3- June 1, Day 7- June 5, and Day 14- June 12. CM discussed follow-up vaccine appointments can be made at [facility], administered at a pharmacy, or at [facility]. Encouraged patient to contact insurance provider for most cost-effective provider for patient. Patient requested appointment be made with [facility]. Prescription was scanned into chart. CM sent referral to [facility]  via e-mail.     BRIAN STEELE

## 2025-05-30 PROBLEM — Z20.3 RABIES EXPOSURE: Status: ACTIVE | Noted: 2025-05-30

## 2025-05-30 NOTE — ED PROVIDER NOTES
[facility]  EMERGENCY DEPARTMENT ENCOUNTER      Pt Name: Chelly Lin  MRN: [id number]  Birthdate 1955  Date of evaluation: 5/29/2025  Provider: Emerson Dillon PA-C    CHIEF COMPLAINT       Chief Complaint   Patient presents with   • Animal Bite   • Immunizations         HISTORY OF PRESENT ILLNESS   (Location/Symptom, Timing/Onset, Context/Setting, Quality, Duration, Modifying Factors, Severity)  Note limiting factors.   Chelly Lin is a 69 y.o. female presenting to ED for rabies vaccination.  Bitten by dog 15 days ago and unable to confirm rabies vaccine status of dog.  UTD Tetanus.           Review of External Medical Records:     Nursing Notes were reviewed.    REVIEW OF SYSTEMS    (2-9 systems for level 4, 10 or more for level 5)       Except as noted above the remainder of the review of systems was reviewed and negative.       PAST MEDICAL HISTORY     Past Medical History:   Diagnosis Date   • CAD (coronary artery disease)    • Hyperlipidemia    • Hypertension    • MI (myocardial infarction) (HCC)    • Prediabetes          SURGICAL HISTORY       Past Surgical History:   Procedure Laterality Date   • BREAST SURGERY      cystectomy x2   • CORONARY ANGIOPLASTY WITH STENT PLACEMENT  04/26/2017   • GYN      vaginal wall repair   • OTHER SURGICAL HISTORY      surgery for fissure         CURRENT MEDICATIONS       Discharge Medication List as of 5/29/2025  6:14 PM        CONTINUE these medications which have NOT CHANGED    Details   MOUNJARO 5 MG/0.5ML SOAJ pen INJECT 5 MG ONCE WEEKLY SUBCUTANEOUS 28 DAYS, DAWHistorical Med      rosuvastatin (CRESTOR) 20 MG tablet Historical Med      isosorbide mononitrate (IMDUR) 60 MG extended release tablet Take 1.5 tablets by mouth dailyHistorical Med      REPATHA SURECLICK SOAJ pen INJECT 140 MG UNDER THE SKIN EVERY 14 DAYS, DAWHistorical Med      traZODone (DESYREL) 50 MG tablet Take 1 tablet by mouth nightly as neededHistorical Med

## 2025-06-01 ENCOUNTER — HOSPITAL ENCOUNTER (OUTPATIENT)
Facility: HOSPITAL | Age: 70
Setting detail: INFUSION SERIES
Discharge: HOME | End: 2025-06-01
Payer: COMMERCIAL

## 2025-06-01 DIAGNOSIS — Z20.3 RABIES EXPOSURE: Primary | ICD-10-CM

## 2025-06-01 PROCEDURE — 90675 RABIES VACCINE IM: CPT | Performed by: PHYSICIAN ASSISTANT

## 2025-06-01 PROCEDURE — 6360000002 HC RX W HCPCS: Performed by: PHYSICIAN ASSISTANT

## 2025-06-01 PROCEDURE — 90471 IMMUNIZATION ADMIN: CPT | Performed by: PHYSICIAN ASSISTANT

## 2025-06-01 RX ADMIN — RABIES VACCINE 1 ML: KIT at 10:02

## 2025-06-01 NOTE — PROGRESS NOTES
[facility] Short Note                       Date: 2025    Name: Chelly Lin    MRN: [id number]         : 1955      Pt admit to [facility] for Day 3 Rabavert ambulatory in stable condition. No acute concerns at this time.    Medications given: Given in right deltoid by JULITA Oswald RN  Medications Administered         rabies vaccine, PCEC (RABAVERT) injection 1 mL Admin Date  2025 Action  Given Dose  1 mL Route  IntraMUSCular Documented By  Nu Ramirez RN            Ms. Lin tolerated the injection and [facility]. Patient is aware if future appointments.    Future Appointments   Date Time Provider Department Center   2025  4:00 PM JORGE FASTTRACK 1 RCH[facility]   [facility]   2025  3:00 PM JOHNATHON FASTTRACK 3 BREMOS[facility]       NU RAMIREZ RN  2025  10:03 AM

## 2025-06-05 ENCOUNTER — HOSPITAL ENCOUNTER (OUTPATIENT)
Facility: HOSPITAL | Age: 70
Setting detail: INFUSION SERIES
Discharge: HOME | End: 2025-06-05
Payer: COMMERCIAL

## 2025-06-05 VITALS
TEMPERATURE: 98.1 F | SYSTOLIC BLOOD PRESSURE: 103 MMHG | RESPIRATION RATE: 16 BRPM | HEART RATE: 84 BPM | OXYGEN SATURATION: 98 % | DIASTOLIC BLOOD PRESSURE: 66 MMHG

## 2025-06-05 DIAGNOSIS — Z20.3 RABIES EXPOSURE: Primary | ICD-10-CM

## 2025-06-05 PROCEDURE — 90471 IMMUNIZATION ADMIN: CPT

## 2025-06-05 PROCEDURE — 96372 THER/PROPH/DIAG INJ SC/IM: CPT

## 2025-06-05 PROCEDURE — 90471 IMMUNIZATION ADMIN: CPT | Performed by: PHYSICIAN ASSISTANT

## 2025-06-05 PROCEDURE — 6360000002 HC RX W HCPCS: Performed by: PHYSICIAN ASSISTANT

## 2025-06-05 PROCEDURE — 90675 RABIES VACCINE IM: CPT | Performed by: PHYSICIAN ASSISTANT

## 2025-06-05 RX ADMIN — RABIES VACCINE 1 ML: KIT at 16:21

## 2025-06-05 NOTE — PROGRESS NOTES
[facility] Progress Note    Pt arrived in stable condition and in no distress for Rabies D7    Assessment completed.     Patient Vitals for the past 12 hrs:   Temp Pulse Resp BP SpO2   06/05/25 1615 98.1 °F (36.7 °C) 84 16 103/66 98 %      The following medications administered:  Medications Administered         rabies vaccine, PCEC (RABAVERT) injection 1 mL Admin Date  06/05/2025 Action  Given Dose  1 mL Route  IntraMUSCular Documented By  Marcie Thomas, RN          Pt tolerated treatment well. No s/s of adverse reaction noted.    Pt discharged in no acute distress. Next appointment:    Future Appointments   Date Time Provider Department Center   6/12/2025  3:00 PM JOHNATHON FASTTRACK 3 BREMOS[facility]   7/29/2025  3:30 PM \[facility]\""C KUMAR 2 MRMRMAM [facility]

## 2025-06-12 ENCOUNTER — HOSPITAL ENCOUNTER (OUTPATIENT)
Facility: HOSPITAL | Age: 70
Setting detail: INFUSION SERIES
Discharge: HOME | End: 2025-06-12
Payer: COMMERCIAL

## 2025-06-12 VITALS — TEMPERATURE: 97.7 F

## 2025-06-12 DIAGNOSIS — Z20.3 RABIES EXPOSURE: Primary | ICD-10-CM

## 2025-06-12 PROCEDURE — 90675 RABIES VACCINE IM: CPT | Performed by: PHYSICIAN ASSISTANT

## 2025-06-12 PROCEDURE — 90471 IMMUNIZATION ADMIN: CPT | Performed by: PHYSICIAN ASSISTANT

## 2025-06-12 PROCEDURE — 6360000002 HC RX W HCPCS: Performed by: PHYSICIAN ASSISTANT

## 2025-06-12 RX ADMIN — RABIES VACCINE 1 ML: KIT at 15:07

## 2025-06-12 NOTE — PROGRESS NOTES
[facility] Short Note                       Date: 2025    Name: Chelly Lin    MRN: [id number]         : 1955      Pt admit to [facility] for Rabies (last dose) ambulatory in stable condition. Assessment completed and documented in flowsheets. No acute concerns at this time.  Please review pending lab results in CC.      Ms. Lin's vitals were reviewed:  Patient Vitals for the past 12 hrs:   Temp   25 1506 97.7 °F (36.5 °C)       Medications given: R deltoid  Medications Administered         rabies vaccine, PCEC (RABAVERT) injection 1 mL Admin Date  2025 Action  Given Dose  1 mL Route  IntraMUSCular Documented By  Jazz Beth RN            Medication education reinforced with patient and they verbalize understanding.    Ms. Lin tolerated the injection and was [facility] in [facility] condition. Patient is aware if future appointments.    Future Appointments   Date Time Provider Department Center   2025  3:30 PM [facility] 2 MRMRMAM [facility]       Jazz Beth RN  2025  3:08 PM

## (undated) DEVICE — TUBING PRSS MON L6IN PVC M FEM CONN

## (undated) DEVICE — SYR POWER 150ML 8IN FILL TUBE --

## (undated) DEVICE — VALVE INSRT TOOL ADPT MTL 9FR -- ACCESS

## (undated) DEVICE — 3M™ TEGADERM™ TRANSPARENT FILM DRESSING FRAME STYLE, 1626W, 4 IN X 4-3/4 IN (10 CM X 12 CM), 50/CT 4CT/CASE: Brand: 3M™ TEGADERM™

## (undated) DEVICE — CATH GUID COR EB35 6FR 100CM -- LAUNCHER

## (undated) DEVICE — PROVE COVER: Brand: UNBRANDED

## (undated) DEVICE — CUSTOM KT PTCA INFL DEV K05 00053H

## (undated) DEVICE — GUIDEWIRE VASC L145CM 0.035IN J TIP L3MM PTFE FIX COR NAMIC

## (undated) DEVICE — GUIDEWIRE VASC L260CM 0.035IN J TIP L3MM PTFE FIX COR NAMIC

## (undated) DEVICE — TREK CORONARY DILATATION CATHETER 2.50 MM X 15 MM / RAPID-EXCHANGE: Brand: TREK

## (undated) DEVICE — PACK PROCEDURE SURG HRT CATH

## (undated) DEVICE — SYRINGE ANGIO 10 CC BRL STD PRNT POLYCARB LT BLU MEDALLION

## (undated) DEVICE — SPLINT WR POS F/ARTERIAL ACC -- BX/10

## (undated) DEVICE — TORCON NB, ADVANTAGE CATHETER: Brand: TORCON NB

## (undated) DEVICE — COPILOT KIT INCLUDES BLEEDBACK CONTROL VALVE / GUIDE WIRE INTRODUCER / TORQUE DEVICE: Brand: ACCESSORIES

## (undated) DEVICE — CATHETER ETER ANGIO L110CM OD5FR ID046IN L75CM 038IN 145DEG CARD

## (undated) DEVICE — Z DISCONTINUED NO SUB IDED SET EXTN W/ 4 W STPCOCK M SPIN LOK 36IN

## (undated) DEVICE — TR BAND RADIAL ARTERY COMPRESSION DEVICE: Brand: TR BAND

## (undated) DEVICE — PINNACLE INTRODUCER SHEATH: Brand: PINNACLE

## (undated) DEVICE — HI-TORQUE VERSACORE FLOPPY GUIDE WIRE SYSTEM 145 CM: Brand: HI-TORQUE VERSACORE

## (undated) DEVICE — 40 MHZ CORONARY IMAGING CATHETER: Brand: OPTICROSS

## (undated) DEVICE — RUNTHROUGH NS EXTRA FLOPPY PTCA GUIDEWIRE: Brand: RUNTHROUGH

## (undated) DEVICE — AIRLIFE™ ADULT CUSHION NASAL CANNULA 14 FOOT (4.3) CRUSH-RESISTANT OXYGEN TUBING, AND U/CONNECT-IT ADAPTER: Brand: AIRLIFE™

## (undated) DEVICE — CATH GUID COR EB30 5FR 100CM -- LAUNCHER

## (undated) DEVICE — GDWIRE FIX COR J 3MM 0.035X260 --

## (undated) DEVICE — Device

## (undated) DEVICE — GLIDESHEATH SLENDER ACCESS KIT: Brand: GLIDESHEATH SLENDER

## (undated) DEVICE — RADIFOCUS OPTITORQUE ANGIOGRAPHIC CATHETER: Brand: OPTITORQUE

## (undated) DEVICE — DRESSING HEMOSTATIC SFT INTVENT W/O SLT DBL WRP QUIKCLOT LF

## (undated) DEVICE — CATH GUID COR EB35 5FR 100CM -- LAUNCHER

## (undated) DEVICE — KIT ANGIOGRAPHY CUST MRMC

## (undated) DEVICE — RADIFOCUS GLIDECATH: Brand: GLIDECATH

## (undated) DEVICE — PRESSURE GUIDEWIRE: Brand: COMET™ II

## (undated) DEVICE — ANGIO-SEAL VIP VASCULAR CLOSURE DEVICE: Brand: ANGIO-SEAL

## (undated) DEVICE — DISPOSABLE PULLBACK SLED FOR MOTORDRIVE

## (undated) DEVICE — CATHETER ETER CARD MULTIPAK MULTIPAK 5FR PERFORMA

## (undated) DEVICE — Device: Brand: PROWATER

## (undated) DEVICE — NC TREK CORONARY DILATATION CATHETER 3.75 MM X 15 MM / RAPID-EXCHANGE: Brand: NC TREK